# Patient Record
Sex: FEMALE | Race: BLACK OR AFRICAN AMERICAN | Employment: FULL TIME | URBAN - METROPOLITAN AREA
[De-identification: names, ages, dates, MRNs, and addresses within clinical notes are randomized per-mention and may not be internally consistent; named-entity substitution may affect disease eponyms.]

---

## 2017-01-05 ENCOUNTER — HOSPITAL ENCOUNTER (OUTPATIENT)
Dept: NON INVASIVE DIAGNOSTICS | Age: 42
Discharge: HOME | End: 2017-01-05

## 2017-01-25 DIAGNOSIS — R06.09 DOE (DYSPNEA ON EXERTION): Primary | ICD-10-CM

## 2017-01-27 ENCOUNTER — HOSPITAL ENCOUNTER (OUTPATIENT)
Dept: NON INVASIVE DIAGNOSTICS | Age: 42
Discharge: HOME | End: 2017-01-27
Payer: MEDICAID

## 2017-01-27 DIAGNOSIS — R06.09 DOE (DYSPNEA ON EXERTION): ICD-10-CM

## 2017-01-27 LAB
ATTENDING PHYSICIAN, CST07: NORMAL
DIAGNOSIS, 93000: NORMAL
DUKE TM SCORE RESULT, CST14: NORMAL
DUKE TREADMILL SCORE, CST13: NORMAL
ECG INTERP BEFORE EX, CST11: NORMAL
ECG INTERP DURING EX, CST12: NORMAL
FUNCTIONAL CAPACITY, CST17: NORMAL
KNOWN CARDIAC CONDITION, CST08: NORMAL
MAX. DIASTOLIC BP, CST04: 100 MMHG
MAX. HEART RATE, CST05: 109 BPM
MAX. SYSTOLIC BP, CST03: 176 MMHG
OVERALL BP RESPONSE TO EXERCISE, CST16: NORMAL
OVERALL HR RESPONSE TO EXERCISE, CST15: NORMAL
PEAK EX METS, CST10: 1.6 METS
PROTOCOL NAME, CST01: NORMAL
TEST INDICATION, CST09: NORMAL

## 2017-01-27 PROCEDURE — 78452 HT MUSCLE IMAGE SPECT MULT: CPT

## 2017-01-27 PROCEDURE — 93306 TTE W/DOPPLER COMPLETE: CPT

## 2017-01-27 PROCEDURE — A9500 TC99M SESTAMIBI: HCPCS

## 2017-01-27 PROCEDURE — 74011250636 HC RX REV CODE- 250/636

## 2017-01-27 PROCEDURE — 93017 CV STRESS TEST TRACING ONLY: CPT

## 2017-01-27 RX ORDER — SODIUM CHLORIDE 9 MG/ML
250 INJECTION, SOLUTION INTRAVENOUS ONCE
Status: COMPLETED | OUTPATIENT
Start: 2017-01-27 | End: 2017-01-27

## 2017-01-27 RX ADMIN — SODIUM CHLORIDE 250 ML/HR: 900 INJECTION, SOLUTION INTRAVENOUS at 10:00

## 2017-01-27 RX ADMIN — REGADENOSON 0.4 MG: 0.08 INJECTION, SOLUTION INTRAVENOUS at 10:00

## 2017-01-27 NOTE — PROGRESS NOTES
Patient was given 11 mCi of Sestamibi for the Resting pictures. Patient received 0.4 mg of Lexiscan for the exercise portion of the Stress test. Patient was then given 33 mCi of Sestamibi for the Stress pictures. Armband was removed and disposed of before the patient left.

## 2017-01-27 NOTE — PROCEDURES
[address] STRESS    Name:  Jennifer May  MR#:  [id number]  :  1975  Account #:  [de-identified]  Date of Adm:  2017  Date of Service:      PERFORMING PHYSICIAN: Lacie Deleon MD.    REQUESTING PHYSICIAN: Dr. Bernardo Morales. CLINICAL HISTORY: A 39-year-old woman with no known coronary  artery disease. CARDIAC RISK FACTORS: Include: Hypertension, hyperlipidemia,  family history of premature coronary artery disease, and tobacco  abuse. CURRENT SYMPTOMS: Include: Chest pain. PROCEDURE: After I obtained appropriate informed consent,  pharmacologic stress testing was performed using Lexiscan 0.4 mg  intravenously. The patient walked in a limited fashion for 4 minutes  following Lexiscan infusion. The patient developed no unusual  symptoms. REASON FOR TERMINATION: Protocol reached. RESTING ELECTROCARDIOGRAM: Sinus rhythm. Left axis  deviation. The stress electrocardiogram; sinus tachycardia,  nondiagnostic ST and T-wave variations/equivocal findings. MYOCARDIAL IMAGING: Performed at rest 30 minutes following the  injection of 11 mCi of sestamibi. At peak pharmacologic effect, the  patient was injected with 33 mCi of sestamibi. Gated post stress  tomographic imaging was performed 45 minutes after stress. FINDINGS: The overall quality of the study was good. Splanchnic  activity was mild. Soft tissue attenuation was mild to moderate. Left  ventricular cavity was noted to be normal in size in the rest and stress  studies. The right ventricle was unremarkable. Stress SPECT images  demonstrated a medium size area of decreased cardiac uptake of  moderate intensity in the anterior apical wall of the left ventricle. The  rest images were similar. Gated SPECT imaging showed mild anterior  apical hypokinesis. The left ventricular ejection fraction was 48%. IMPRESSION:  1.  Myocardial perfusion imaging is abnormal. There is a medium size  area of reduced cardiac tracer uptake in both the rest and stress  studies. This may represent an area of prior scarring in the anterior  apex. 2. Overall left ventricular systolic function was mildly decreased with  regional wall motion abnormalities as noted above. 3. Moderate risk for ischemia. FOLLOWUP PLAN/RECOMMENDATION: Per Dr. Heaven Salinas.         Elizabeth Wilson MD    [facility] / [facility]  D:  01/27/2017   15:41  T:  01/27/2017   18:55  Job #:  [id number]

## 2017-01-31 NOTE — PROGRESS NOTES
Per last office visit:    The patient will not be able to ambulate on a treadmill. She is complaining of bilateral knee discomfort.  Pharmacologic stress testing and a Cardiolite

## 2017-02-07 ENCOUNTER — OFFICE VISIT (OUTPATIENT)
Dept: FAMILY MEDICINE CLINIC | Age: 42
End: 2017-02-07

## 2017-02-07 VITALS
TEMPERATURE: 98.9 F | HEART RATE: 80 BPM | OXYGEN SATURATION: 98 % | DIASTOLIC BLOOD PRESSURE: 74 MMHG | BODY MASS INDEX: 42.17 KG/M2 | SYSTOLIC BLOOD PRESSURE: 136 MMHG | WEIGHT: 238 LBS | HEIGHT: 63 IN | RESPIRATION RATE: 16 BRPM

## 2017-02-07 DIAGNOSIS — I10 ESSENTIAL HYPERTENSION: Primary | ICD-10-CM

## 2017-02-07 DIAGNOSIS — E55.9 VITAMIN D DEFICIENCY: ICD-10-CM

## 2017-02-07 DIAGNOSIS — G56.03 BILATERAL CARPAL TUNNEL SYNDROME: ICD-10-CM

## 2017-02-07 DIAGNOSIS — D50.0 IRON DEFICIENCY ANEMIA DUE TO CHRONIC BLOOD LOSS: ICD-10-CM

## 2017-02-07 DIAGNOSIS — E78.00 PURE HYPERCHOLESTEROLEMIA: ICD-10-CM

## 2017-02-07 DIAGNOSIS — Z72.0 TOBACCO USE: ICD-10-CM

## 2017-02-07 NOTE — PATIENT INSTRUCTIONS
Stopping Smoking: Care Instructions  Your Care Instructions  Cigarette smokers crave the nicotine in cigarettes. Giving it up is much harder than simply changing a habit. Your body has to stop craving the nicotine. It is hard to quit, but you can do it. There are many tools that people use to quit smoking. You may find that combining tools works best for you. There are several steps to quitting. First you get ready to quit. Then you get support to help you. After that, you learn new skills and behaviors to become a nonsmoker. For many people, a necessary step is getting and using medicine. Your doctor will help you set up the plan that best meets your needs. You may want to attend a smoking cessation program to help you quit smoking. When you choose a program, look for one that has proven success. Ask your doctor for ideas. You will greatly increase your chances of success if you take medicine as well as get counseling or join a cessation program.  Some of the changes you feel when you first quit tobacco are uncomfortable. Your body will miss the nicotine at first, and you may feel short-tempered and grumpy. You may have trouble sleeping or concentrating. Medicine can help you deal with these symptoms. You may struggle with changing your smoking habits and rituals. The last step is the tricky one: Be prepared for the smoking urge to continue for a time. This is a lot to deal with, but keep at it. You will feel better. Follow-up care is a key part of your treatment and safety. Be sure to make and go to all appointments, and call your doctor if you are having problems. Its also a good idea to know your test results and keep a list of the medicines you take. How can you care for yourself at home? · Ask your family, friends, and coworkers for support. You have a better chance of quitting if you have help and support.   · Join a support group, such as Nicotine Anonymous, for people who are trying to quit smoking. · Consider signing up for a smoking cessation program, such as the American [employer] Association's Freedom from Smoking program.  · Set a quit date. Pick your date carefully so that it is not right in the middle of a big deadline or stressful time. Once you quit, do not even take a puff. Get rid of all ashtrays and lighters after your last cigarette. Clean your house and your clothes so that they do not smell of smoke. · Learn how to be a nonsmoker. Think about ways you can avoid those things that make you reach for a cigarette. ¨ Avoid situations that put you at greatest risk for smoking. For some people, it is hard to have a drink with friends without smoking. For others, they might skip a coffee break with coworkers who smoke. ¨ Change your daily routine. Take a different route to work or eat a meal in a different place. · Cut down on stress. Calm yourself or release tension by doing an activity you enjoy, such as reading a book, taking a hot bath, or gardening. · Talk to your doctor or pharmacist about nicotine replacement therapy, which replaces the nicotine in your body. You still get nicotine but you do not use tobacco. Nicotine replacement products help you slowly reduce the amount of nicotine you need. These products come in several forms, many of them available over-the-counter:  ¨ Nicotine patches  ¨ Nicotine gum and lozenges  ¨ Nicotine inhaler  · Ask your doctor about bupropion (Wellbutrin) or varenicline (Chantix), which are prescription medicines. They do not contain nicotine. They help you by reducing withdrawal symptoms, such as stress and anxiety. · Some people find hypnosis, acupuncture, and massage helpful for ending the smoking habit. · Eat a healthy diet and get regular exercise. Having healthy habits will help your body move past its craving for nicotine. · Be prepared to keep trying. Most people are not successful the first few times they try to quit.  Do not get mad at yourself if you smoke again. Make a list of things you learned and think about when you want to try again, such as next week, next month, or next year. Where can you learn more? Go to http://juan ramon-chuy.info/. Enter Y249 in the search box to learn more about \"Stopping Smoking: Care Instructions. \"  Current as of: May 26, 2016  Content Version: 11.1  © 4259-7587 [employer]. Care instructions adapted under license by [employer] (which disclaims liability or warranty for this information). If you have questions about a medical condition or this instruction, always ask your healthcare professional. [place] [address] any warranty or liability for your use of this information. DASH Diet: Care Instructions  Your Care Instructions  The DASH diet is an eating plan that can help lower your blood pressure. DASH stands for Dietary Approaches to Stop Hypertension. Hypertension is high blood pressure. The DASH diet focuses on eating foods that are high in calcium, potassium, and magnesium. These nutrients can lower blood pressure. The foods that are highest in these nutrients are fruits, vegetables, low-fat dairy products, nuts, seeds, and legumes. But taking calcium, potassium, and magnesium supplements instead of eating foods that are high in those nutrients does not have the same effect. The DASH diet also includes whole grains, fish, and poultry. The DASH diet is one of several lifestyle changes your doctor may recommend to lower your high blood pressure. Your doctor may also want you to decrease the amount of sodium in your diet. Lowering sodium while following the DASH diet can lower blood pressure even further than just the DASH diet alone. Follow-up care is a key part of your treatment and safety. Be sure to make and go to all appointments, and call your doctor if you are having problems.  It's also a good idea to know your test results and keep a list of the medicines you take. How can you care for yourself at home? Following the DASH diet  · Eat 4 to 5 servings of fruit each day. A serving is 1 medium-sized piece of fruit, ½ cup chopped or canned fruit, 1/4 cup dried fruit, or 4 ounces (½ cup) of fruit juice. Choose fruit more often than fruit juice. · Eat 4 to 5 servings of vegetables each day. A serving is 1 cup of lettuce or raw leafy vegetables, ½ cup of chopped or cooked vegetables, or 4 ounces (½ cup) of vegetable juice. Choose vegetables more often than vegetable juice. · Get 2 to 3 servings of low-fat and fat-free dairy each day. A serving is 8 ounces of milk, 1 cup of yogurt, or 1 ½ ounces of cheese. · Eat 6 to 8 servings of grains each day. A serving is 1 slice of bread, 1 ounce of dry cereal, or ½ cup of cooked rice, pasta, or cooked cereal. Try to choose whole-grain products as much as possible. · Limit lean meat, poultry, and fish to 2 servings each day. A serving is 3 ounces, about the size of a deck of cards. · Eat 4 to 5 servings of nuts, seeds, and legumes (cooked dried beans, lentils, and split peas) each week. A serving is 1/3 cup of nuts, 2 tablespoons of seeds, or ½ cup of cooked beans or peas. · Limit fats and oils to 2 to 3 servings each day. A serving is 1 teaspoon of vegetable oil or 2 tablespoons of salad dressing. · Limit sweets and added sugars to 5 servings or less a week. A serving is 1 tablespoon jelly or jam, ½ cup sorbet, or 1 cup of lemonade. · Eat less than 2,300 milligrams (mg) of sodium a day. If you limit your sodium to 1,500 mg a day, you can lower your blood pressure even more. Tips for success  · Start small. Do not try to make dramatic changes to your diet all at once. You might feel that you are missing out on your favorite foods and then be more likely to not follow the plan. Make small changes, and stick with them. Once those changes become habit, add a few more changes.   · Try some of the following:  ¨ Make it a goal to eat a fruit or vegetable at every meal and at snacks. This will make it easy to get the recommended amount of fruits and vegetables each day. ¨ Try yogurt topped with fruit and nuts for a snack or healthy dessert. ¨ Add lettuce, tomato, cucumber, and onion to sandwiches. ¨ Combine a ready-made pizza crust with low-fat mozzarella cheese and lots of vegetable toppings. Try using tomatoes, squash, spinach, broccoli, carrots, cauliflower, and onions. ¨ Have a variety of cut-up vegetables with a low-fat dip as an appetizer instead of chips and dip. ¨ Sprinkle sunflower seeds or chopped almonds over salads. Or try adding chopped walnuts or almonds to cooked vegetables. ¨ Try some vegetarian meals using beans and peas. Add garbanzo or kidney beans to salads. Make burritos and tacos with mashed dahl beans or black beans. Where can you learn more? Go to http://juan ramon-chuy.info/. Enter T915 in the search box to learn more about \"DASH Diet: Care Instructions. \"  Current as of: March 23, 2016  Content Version: 11.1  © 0802-4643 [employer]. Care instructions adapted under license by [employer] (which disclaims liability or warranty for this information). If you have questions about a medical condition or this instruction, always ask your healthcare professional. [place] [address] any warranty or liability for your use of this information.

## 2017-02-07 NOTE — MR AVS SNAPSHOT
Visit Information Date & Time Provider Department Dept. Phone Encounter #  
 2/7/2017  4:15 PM Mariely Cano, [address] [id number] Follow-up Instructions Return in about 5 months (around 7/7/2017), or if symptoms worsen or fail to improve. Your Appointments 2/21/2017  2:00 PM  
New Patient with Sandra Putnam MD  
[address] [facility] and [facility]) Appt Note: new patient; PT R/S FROM 01/31/17 RUNNING LATE  
 [address] [facility] [id number] [phone]  
  
   
 [address] [facility],[address] [facility]  
  
    
 2/21/2017  4:00 PM  
Follow Up with Mychal Aguilar MD  
[address]) Appt Note: 3mon f/u  
 [address] [facility] [address] [facility] [address] [phone]  
  
   
 [facility] [address] Upcoming Health Maintenance Date Due  
 PAP AKA CERVICAL CYTOLOGY 8/21/2018 DTaP/Tdap/Td series (2 - Td) 10/25/2026 Allergies as of 2/7/2017  Review Complete On: 2/7/2017 By: Mariely Cano MD  
  
 Severity Noted Reaction Type Reactions Ace Inhibitors  09/26/2012    Swelling Current Immunizations  Reviewed on 10/25/2016 Name Date Influenza Vaccine (Quad) PF 10/25/2016 Influenza Vaccine PF 12/11/2013 Pneumococcal Polysaccharide (PPSV-23) 10/25/2016 Tdap 10/25/2016 Not reviewed this visit You Were Diagnosed With   
  
 Codes Comments Essential hypertension    -  Primary ICD-10-CM: I10 
ICD-9-CM: 401.9 Tobacco use     ICD-10-CM: Z72.0 ICD-9-CM: 305.1 Pure hypercholesterolemia     ICD-10-CM: E78.00 ICD-9-CM: 272.0 Bilateral carpal tunnel syndrome     ICD-10-CM: G56.03 
ICD-9-CM: 354.0 Iron deficiency anemia due to chronic blood loss     ICD-10-CM: D50.0 ICD-9-CM: 280.0 Vitamin D deficiency     ICD-10-CM: E55.9 ICD-9-CM: 268.9 Vitals BP Pulse Temp Resp Height(growth percentile) Weight(growth percentile) 136/74 (BP 1 Location: Left arm, BP Patient Position: Sitting) 80 98.9 °F (37.2 °C) (Oral) 16 5' 3\" (1.6 m) 238 lb (108 kg) SpO2 BMI OB Status Smoking Status 98% 42.16 kg/m2 Having regular periods Current Every Day Smoker Vitals History BMI and BSA Data Body Mass Index Body Surface Area  
 42.16 kg/m 2 2.19 m 2 Preferred Pharmacy Pharmacy Name Phone [facility] [address] [phone] Your Updated Medication List  
  
   
This list is accurate as of: 2/7/17  5:02 PM.  Always use your most recent med list.  
  
  
  
  
 albuterol 90 mcg/actuation inhaler Commonly known as:  Chelly Melendez Take 2 Puffs by inhalation every four (4) hours as needed for Wheezing. amLODIPine 10 mg tablet Commonly known as:  Humphreys Reyes Take 1 Tab by mouth daily. atorvastatin 20 mg tablet Commonly known as:  LIPITOR Take 1 Tab by mouth nightly. buPROPion  mg tablet Commonly known as:  Barron Crew Take 150 mg by mouth every morning. butalbital-acetaminophen-caff -40 mg per capsule Commonly known as:  [employer] Take 1 tablet every 8 hours as needed for headache  
  
 carvedilol 25 mg tablet Commonly known as:  Haskel Scarce Take 1 Tab by mouth two (2) times daily (with meals). cloNIDine HCl 0.1 mg tablet Commonly known as:  CATAPRES Take 1 Tab by mouth two (2) times a day. desloratadine 5 mg tablet Commonly known as:  CLARINEX Take 1 Tab by mouth daily as needed for Allergies. ergocalciferol 50,000 unit capsule Commonly known as:  ERGOCALCIFEROL Take 1 Cap by mouth every seven (7) days. ferrous sulfate 325 mg (65 mg iron) tablet Take  by mouth daily. gabapentin 100 mg capsule Commonly known as:  NEURONTIN  
 1 twice a day for 2 days then 1 three times a day for three days then 2 three times a day for 7 days then 3 three times a day GARLIC PO Take  by mouth.  
  
 polyethylene glycol 17 gram/dose powder Commonly known as:  Deliliatwin Hefty Take 17 g by mouth daily. One to two caps daily  
  
 sertraline 50 mg tablet Commonly known as:  ZOLOFT Take 75 mg by mouth nightly. Follow-up Instructions Return in about 5 months (around 7/7/2017), or if symptoms worsen or fail to improve. Patient Instructions Stopping Smoking: Care Instructions Your Care Instructions Cigarette smokers crave the nicotine in cigarettes. Giving it up is much harder than simply changing a habit. Your body has to stop craving the nicotine. It is hard to quit, but you can do it. There are many tools that people use to quit smoking. You may find that combining tools works best for you. There are several steps to quitting. First you get ready to quit. Then you get support to help you. After that, you learn new skills and behaviors to become a nonsmoker. For many people, a necessary step is getting and using medicine. Your doctor will help you set up the plan that best meets your needs. You may want to attend a smoking cessation program to help you quit smoking. When you choose a program, look for one that has proven success. Ask your doctor for ideas. You will greatly increase your chances of success if you take medicine as well as get counseling or join a cessation program. 
Some of the changes you feel when you first quit tobacco are uncomfortable. Your body will miss the nicotine at first, and you may feel short-tempered and grumpy. You may have trouble sleeping or concentrating. Medicine can help you deal with these symptoms. You may struggle with changing your smoking habits and rituals. The last step is the tricky one: Be prepared for the smoking urge to continue for a time.  This is a lot to deal with, but keep at it. You will feel better. Follow-up care is a key part of your treatment and safety. Be sure to make and go to all appointments, and call your doctor if you are having problems. Its also a good idea to know your test results and keep a list of the medicines you take. How can you care for yourself at home? · Ask your family, friends, and coworkers for support. You have a better chance of quitting if you have help and support. · Join a support group, such as Nicotine Anonymous, for people who are trying to quit smoking. · Consider signing up for a smoking cessation program, such as the American Lung Association's Freedom from Smoking program. 
· Set a quit date. Pick your date carefully so that it is not right in the middle of a big deadline or stressful time. Once you quit, do not even take a puff. Get rid of all ashtrays and lighters after your last cigarette. Clean your house and your clothes so that they do not smell of smoke. · Learn how to be a nonsmoker. Think about ways you can avoid those things that make you reach for a cigarette. ¨ Avoid situations that put you at greatest risk for smoking. For some people, it is hard to have a drink with friends without smoking. For others, they might skip a coffee break with coworkers who smoke. ¨ Change your daily routine. Take a different route to work or eat a meal in a different place. · Cut down on stress. Calm yourself or release tension by doing an activity you enjoy, such as reading a book, taking a hot bath, or gardening. · Talk to your doctor or pharmacist about nicotine replacement therapy, which replaces the nicotine in your body. You still get nicotine but you do not use tobacco. Nicotine replacement products help you slowly reduce the amount of nicotine you need. These products come in several forms, many of them available over-the-counter: ¨ Nicotine patches ¨ Nicotine gum and lozenges ¨ Nicotine inhaler · Ask your doctor about bupropion (Wellbutrin) or varenicline (Chantix), which are prescription medicines. They do not contain nicotine. They help you by reducing withdrawal symptoms, such as stress and anxiety. · Some people find hypnosis, acupuncture, and massage helpful for ending the smoking habit. · Eat a healthy diet and get regular exercise. Having healthy habits will help your body move past its craving for nicotine. · Be prepared to keep trying. Most people are not successful the first few times they try to quit. Do not get mad at yourself if you smoke again. Make a list of things you learned and think about when you want to try again, such as next week, next month, or next year. Where can you learn more? Go to http://juan ramonAmitivechuy.info/. Enter D540 in the search box to learn more about \"Stopping Smoking: Care Instructions. \" Current as of: May 26, 2016 Content Version: 11.1 © 9276-3413 i4.ms. Care instructions adapted under license by [employer] (which disclaims liability or warranty for this information). If you have questions about a medical condition or this instruction, always ask your healthcare professional. [address] any warranty or liability for your use of this information. DASH Diet: Care Instructions Your Care Instructions The DASH diet is an eating plan that can help lower your blood pressure. DASH stands for Dietary Approaches to Stop Hypertension. Hypertension is high blood pressure. The DASH diet focuses on eating foods that are high in calcium, potassium, and magnesium. These nutrients can lower blood pressure. The foods that are highest in these nutrients are fruits, vegetables, low-fat dairy products, nuts, seeds, and legumes.  But taking calcium, potassium, and magnesium supplements instead of eating foods that are high in those nutrients does not have the same effect. The DASH diet also includes whole grains, fish, and poultry. The DASH diet is one of several lifestyle changes your doctor may recommend to lower your high blood pressure. Your doctor may also want you to decrease the amount of sodium in your diet. Lowering sodium while following the DASH diet can lower blood pressure even further than just the DASH diet alone. Follow-up care is a key part of your treatment and safety. Be sure to make and go to all appointments, and call your doctor if you are having problems. It's also a good idea to know your test results and keep a list of the medicines you take. How can you care for yourself at home? Following the DASH diet · Eat 4 to 5 servings of fruit each day. A serving is 1 medium-sized piece of fruit, ½ cup chopped or canned fruit, 1/4 cup dried fruit, or 4 ounces (½ cup) of fruit juice. Choose fruit more often than fruit juice. · Eat 4 to 5 servings of vegetables each day. A serving is 1 cup of lettuce or raw leafy vegetables, ½ cup of chopped or cooked vegetables, or 4 ounces (½ cup) of vegetable juice. Choose vegetables more often than vegetable juice. · Get 2 to 3 servings of low-fat and fat-free dairy each day. A serving is 8 ounces of milk, 1 cup of yogurt, or 1 ½ ounces of cheese. · Eat 6 to 8 servings of grains each day. A serving is 1 slice of bread, 1 ounce of dry cereal, or ½ cup of cooked rice, pasta, or cooked cereal. Try to choose whole-grain products as much as possible. · Limit lean meat, poultry, and fish to 2 servings each day. A serving is 3 ounces, about the size of a deck of cards. · Eat 4 to 5 servings of nuts, seeds, and legumes (cooked dried beans, lentils, and split peas) each week. A serving is 1/3 cup of nuts, 2 tablespoons of seeds, or ½ cup of cooked beans or peas. · Limit fats and oils to 2 to 3 servings each day. A serving is 1 teaspoon of vegetable oil or 2 tablespoons of salad dressing. · Limit sweets and added sugars to 5 servings or less a week. A serving is 1 tablespoon jelly or jam, ½ cup sorbet, or 1 cup of lemonade. · Eat less than 2,300 milligrams (mg) of sodium a day. If you limit your sodium to 1,500 mg a day, you can lower your blood pressure even more. Tips for success · Start small. Do not try to make dramatic changes to your diet all at once. You might feel that you are missing out on your favorite foods and then be more likely to not follow the plan. Make small changes, and stick with them. Once those changes become habit, add a few more changes. · Try some of the following: ¨ Make it a goal to eat a fruit or vegetable at every meal and at snacks. This will make it easy to get the recommended amount of fruits and vegetables each day. ¨ Try yogurt topped with fruit and nuts for a snack or healthy dessert. ¨ Add lettuce, tomato, cucumber, and onion to sandwiches. ¨ Combine a ready-made pizza crust with low-fat mozzarella cheese and lots of vegetable toppings. Try using tomatoes, squash, spinach, broccoli, carrots, cauliflower, and onions. ¨ Have a variety of cut-up vegetables with a low-fat dip as an appetizer instead of chips and dip. ¨ Sprinkle sunflower seeds or chopped almonds over salads. Or try adding chopped walnuts or almonds to cooked vegetables. ¨ Try some vegetarian meals using beans and peas. Add garbanzo or kidney beans to salads. Make burritos and tacos with mashed dahl beans or black beans. Where can you learn more? Go to http://juan ramon-chuy.info/. Enter Q638 in the search box to learn more about \"DASH Diet: Care Instructions. \" Current as of: March 23, 2016 Content Version: 11.1 © 0962-1665 [employer]. Care instructions adapted under license by [employer] (which disclaims liability or warranty for this information).  If you have questions about a medical condition or this instruction, always ask your healthcare professional. [address] any warranty or liability for your use of this information. Introducing hospitals & HEALTH SERVICES! Dear Barrett Bound: Thank you for requesting a [employer] account. Our records indicate that you already have an active [employer] account. You can access your account anytime at [email or web address] Did you know that you can access your hospital and ER discharge instructions at any time in [employer]? You can also review all of your test results from your hospital stay or ER visit. Additional Information If you have questions, please visit the Frequently Asked Questions section of the [employer] website at [email or web address] Remember, [employer] is NOT to be used for urgent needs. For medical emergencies, dial 911. Now available from your iPhone and Android! Please provide this summary of care documentation to your next provider. Your primary care clinician is listed as Vikki Lenz. If you have any questions after today's visit, please call [phone].

## 2017-02-07 NOTE — PROGRESS NOTES
Alea HayesGradyElana, 35 y.o.,  female    SUBJECTIVE   ff-up    HTN-saw cardiologist and modified meds, BP is better. Stress test result pending. HL-   Says taking all medications, in the past had some inconsistencies with this. Denies cp, sob, leg edema. She continues to smoke 1/2ppd. anemia-  has had chronic menorrhagia has yet to see dr. Nabil Yepez, there was some discussion on endometrial ablation after D and C previously. she does feel tired, and on oral iron replacement for several years. Denies melena, constipation. Reviewed colonoscopy 4/2016 showing internal hemorrhoids, normal EGD/no polyp (Dr. Diana Clark). She reports unable to ff-up with hematologist dr. Jennifer Ruiz due to insurance coverage. She saw new gynecologist, unrecalled name of 'free clinic' and says being evaluated. Currently seeing dr. Daya Marquez for bilateral hand pain, EMG reviewed c/w carpal tunnel. Reviewed labs a1c noted to be 7. She has been prediabetic previous check. Her diet is poor. Says saw psychologist in [facility], and was dx with bipolar disorder, previously was anxiety/depression. She does not agree with diagnosis and hence did not ff-up. ROS:  See HPI, all others negative        Patient Active Problem List   Diagnosis Code    HTN (hypertension) I10    Gastroesophageal reflux disease K21.9    Anxiety and depression F41.9, F32.9    Positive H. pylori test A04.8    Iron deficiency anemia D50.9    Vitamin D deficiency E55.9    Tobacco use Z72.0    Bilateral carpal tunnel syndrome G56.03    Pure hypercholesterolemia E78.00       Current Outpatient Prescriptions   Medication Sig Dispense Refill    GARLIC PO Take  by mouth.  cloNIDine HCl (CATAPRES) 0.1 mg tablet Take 1 Tab by mouth two (2) times a day. 60 Tab 11    carvedilol (COREG) 25 mg tablet Take 1 Tab by mouth two (2) times daily (with meals).  60 Tab 11    gabapentin (NEURONTIN) 100 mg capsule 1 twice a day for 2 days then 1 three times a day for three days then 2 three times a day for 7 days then 3 three times a day 210 Cap 2    atorvastatin (LIPITOR) 20 mg tablet Take 1 Tab by mouth nightly. 30 Tab 0    desloratadine (CLARINEX) 5 mg tablet Take 1 Tab by mouth daily as needed for Allergies. 90 Tab 3    ergocalciferol (ERGOCALCIFEROL) 50,000 unit capsule Take 1 Cap by mouth every seven (7) days. 12 Cap 0    amLODIPine (NORVASC) 10 mg tablet Take 1 Tab by mouth daily. 30 Tab 0    ferrous sulfate 325 mg (65 mg iron) tablet Take  by mouth daily.  polyethylene glycol (MIRALAX) 17 gram/dose powder Take 17 g by mouth daily. One to two caps daily      Butalbital-Acetaminophen-Caff (FIORICET) -40 mg cap Take 1 tablet every 8 hours as needed for headache 12 Cap 0    albuterol (PROVENTIL, VENTOLIN) 90 mcg/actuation inhaler Take 2 Puffs by inhalation every four (4) hours as needed for Wheezing. 17 g 0    sertraline (ZOLOFT) 50 mg tablet Take 75 mg by mouth nightly.  buPROPion XL (WELLBUTRIN XL) 150 mg tablet Take 150 mg by mouth every morning. 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 repeat in 10 years.    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 pylori test 6/9/14     placed on antibiotics and H2 blocker 6/11/14    PPD positive, treated     Tuberculosis ronn, 4/14/16    Vitamin D deficiency 6/9/14       Social History     Social History    Marital status:      Spouse name: N/A    Number of children: N/A    Years of education: N/A     Occupational History    Not on file.      Social History Main Topics    Smoking status: Current Every Day Smoker     Packs/day: 0.50     Years: 0.50    Smokeless tobacco: Never Used    Alcohol use 1.2 oz/week     2 Cans of beer per week      Comment: monthly 1 or 2    Drug use: Yes     Special: Marijuana, Cocaine      Comment: Cocaine stopped 2008, Marijuana 4/2/16 usually once a month or two    Sexual activity: Not Currently     Partners: Female, Male     Birth control/ protection: None     Other Topics Concern     Service Yes     [id number]    Blood Transfusions Yes     2012    Caffeine Concern No    Occupational Exposure No    Hobby Hazards No    Sleep Concern No    Stress Concern Yes    Weight Concern No    Special Diet No    Back Care No    Exercise No    Bike Helmet No    Seat Belt No    Self-Exams No     Social History Narrative       Family History   Problem Relation Age of Onset    Hypertension Mother     Diabetes Mother     Depression Mother     Substance Abuse Mother      Tobacco use    Migraines Mother     High Cholesterol Mother     Heart Attack Mother     Heart Failure Mother     Eczema Mother     Arthritis-osteo Mother     Hypertension Father     Diabetes Father     Substance Abuse Father      Tobacco use and drug abuse    Stroke Paternal Aunt     Heart Disease Maternal Grandfather     Hypertension Maternal Grandfather     High Cholesterol Maternal Grandfather     Diabetes Maternal Grandfather     Stroke Maternal Grandfather     Heart Attack Maternal Grandfather     Heart Failure Maternal Grandfather     Alcohol abuse Brother      Drug/Alcohol abuse    Substance Abuse Maternal Grandmother      Tobacco use - MGM, MGF, and brother,    Diabetes Maternal Grandmother     Hypertension Brother     High Cholesterol Brother     Diabetes Sister     Diabetes Paternal Grandmother     Heart Attack Brother     Heart Failure Brother          OBJECTIVE    Physical Exam:     Visit Vitals    /74 (BP 1 Location: Left arm, BP Patient Position: Sitting)    Pulse 80    Temp 98.9 °F (37.2 °C) (Oral)    Resp 16    Ht 5' 3\" (1.6 m)    Wt 238 lb (108 kg)    SpO2 98%    BMI 42.16 kg/m2       General: alert, obese, AA, in no apparent distress or pain  Neck: supple, no adenopathy palpated  CVS: normal rate, regular rhythm, distinct S1 and S2  Lungs:clear to ausculation bilaterally, no crackles, wheezing or rhonchi noted  Abdomen: normoactive bowel sounds, soft, non-tender  Extremities: no edema, no cyanosis,  Skin: warm, no lesions, rashes noted  Psych:  mood and affect normal    Results for orders placed or performed during the hospital encounter of 01/27/17   NUCLEAR STRESS TEST   Result Value Ref Range    Diagnosis      Test indication Chest Pain     Functional capacity      ECG Interp. Before Exercise      ECG Interp. During Exercise      Overall HR response to exercise appropriate     Overall BP response to exercise normal resting BP - appropriate response     Max. Systolic  mmHg    Max. Diastolic  mmHg    Max. Heart rate 109 BPM    [facility] treadmill score      Lam TM score result      Peak Ex METs 1.6 METS    Protocol name Ana Bailey cardiac condition      Attending physician           ASSESSMENT/PLAN  Alea was seen today for hand pain. Diagnoses and all orders for this visit:    Essential hypertension  Improved, thank you dr. Loretta Styles for recs. Modified meds. Placed on clonidine/coreg now.  Stopped losartan and metoprolol  Cont norvasc, chlorthalidone  Counseled on smoking cessation    Iron deficiency anemia due to chronic blood loss   likely due to menorrhagia, she is mildly symptomatic  Pt says cannot see dr. Brielle Monae again due to insurance issues. Will refer to gerson  there was previous discussion on possible ablation. She has had D and Cs. Cont po fe at this time, take with vit c.   Colonoscopy/egd 4/2016 normal, internal hemorrhoids noted. .   Cont po fe supplementation  Recheck in 5 months:   -     CBC WITH AUTOMATED DIFF; Future    Vitamin D deficiency  Currently on  vit d 50 k q weekly  Recheck vit d in 5 months. Prediabetes  a1c 6.2  Reiterated wt loss/diet    BMI 40.0-44.9, adult (HCC)    Anxiety and depression  Encouraged ff-up appt with CPA for further evaluation, pt is agreeable, and would schedule herself    Pure hypercholesterolemia-at goal LDL <130, cont statin  At goal cont lipitor    Tobacco use  Counseled on cessation    Allergic rhinitis  Cont  clarinex    Follow-up Disposition:  Return in about 5 months (around 7/7/2017), or if symptoms worsen or fail to improve. Patient understands plan of care. Patient has provided input and agrees with goals.

## 2017-02-07 NOTE — PROGRESS NOTES
Chief Complaint   Patient presents with    Hypertension    Cholesterol Problem    Anxiety     1. Have you been to the ER, urgent care clinic since your last visit? Hospitalized since your last visit? No    2. Have you seen or consulted any other health care providers outside of the [facility] since your last visit? Include any pap smears or colon screening.  Yes Dr. Epimenio Libman cards and Dr. Govind Colbert, psych at [facility],   [facility] for gyn

## 2017-02-17 ENCOUNTER — TELEPHONE (OUTPATIENT)
Dept: CARDIOLOGY CLINIC | Age: 42
End: 2017-02-17

## 2017-03-15 ENCOUNTER — OFFICE VISIT (OUTPATIENT)
Dept: ORTHOPEDIC SURGERY | Age: 42
End: 2017-03-15

## 2017-03-15 VITALS
TEMPERATURE: 98.4 F | WEIGHT: 244.8 LBS | DIASTOLIC BLOOD PRESSURE: 97 MMHG | RESPIRATION RATE: 18 BRPM | SYSTOLIC BLOOD PRESSURE: 167 MMHG | HEIGHT: 63 IN | BODY MASS INDEX: 43.38 KG/M2 | HEART RATE: 91 BPM

## 2017-03-15 DIAGNOSIS — M47.899 FACET SYNDROME: ICD-10-CM

## 2017-03-15 DIAGNOSIS — M54.50 PAIN OF LUMBAR SPINE: ICD-10-CM

## 2017-03-15 DIAGNOSIS — M79.18 MYOFASCIAL PAIN: Primary | ICD-10-CM

## 2017-03-15 DIAGNOSIS — M54.2 NECK PAIN: ICD-10-CM

## 2017-03-15 DIAGNOSIS — M54.81 OCCIPITAL NEURALGIA, UNSPECIFIED LATERALITY: ICD-10-CM

## 2017-03-15 DIAGNOSIS — M54.6 THORACIC SPINE PAIN: ICD-10-CM

## 2017-03-15 RX ORDER — DICLOFENAC SODIUM 75 MG/1
75 TABLET, DELAYED RELEASE ORAL
Qty: 60 TAB | Refills: 5 | Status: SHIPPED | OUTPATIENT
Start: 2017-03-15 | End: 2017-12-23 | Stop reason: SDUPTHER

## 2017-03-15 RX ORDER — LIDOCAINE HYDROCHLORIDE 20 MG/ML
4 INJECTION, SOLUTION EPIDURAL; INFILTRATION; INTRACAUDAL; PERINEURAL ONCE
Qty: 4 ML | Refills: 0 | Status: CANCELLED
Start: 2017-03-15 | End: 2017-03-15

## 2017-03-15 RX ORDER — BUPIVACAINE HYDROCHLORIDE 2.5 MG/ML
1 INJECTION, SOLUTION INFILTRATION; PERINEURAL ONCE
Qty: 1 ML | Refills: 0
Start: 2017-03-15 | End: 2017-03-15

## 2017-03-15 RX ORDER — BETAMETHASONE SODIUM PHOSPHATE AND BETAMETHASONE ACETATE 3; 3 MG/ML; MG/ML
12 INJECTION, SUSPENSION INTRA-ARTICULAR; INTRALESIONAL; INTRAMUSCULAR; SOFT TISSUE ONCE
Qty: 1 ML | Refills: 0
Start: 2017-03-15 | End: 2017-03-15

## 2017-03-15 RX ORDER — BUPIVACAINE HYDROCHLORIDE 2.5 MG/ML
4 INJECTION, SOLUTION INFILTRATION; PERINEURAL ONCE
Qty: 4 ML | Refills: 0 | Status: CANCELLED
Start: 2017-03-15 | End: 2017-03-15

## 2017-03-15 RX ORDER — LIDOCAINE HYDROCHLORIDE 20 MG/ML
1 INJECTION, SOLUTION EPIDURAL; INFILTRATION; INTRACAUDAL; PERINEURAL ONCE
Qty: 1 ML | Refills: 0
Start: 2017-03-15 | End: 2017-03-15

## 2017-03-15 RX ORDER — BETAMETHASONE SODIUM PHOSPHATE AND BETAMETHASONE ACETATE 3; 3 MG/ML; MG/ML
12 INJECTION, SUSPENSION INTRA-ARTICULAR; INTRALESIONAL; INTRAMUSCULAR; SOFT TISSUE ONCE
Qty: 2 ML | Refills: 0 | Status: CANCELLED
Start: 2017-03-15 | End: 2017-03-15

## 2017-03-15 NOTE — PROGRESS NOTES
[address]  [address]  [place], [address] [place] [address] Street  Phone: [phone]  Fax: [phone]        Alison Alamo  : 1975  PCP: Theresa Mace MD  3/15/2017    NEW PATIENT      ASSESSMENT AND PLAN     Alea Manzano comes in to the office today c/o 4-10/10 aching pain that encompasses her entire body x several years. She appears to have several potential pain generators causing her diffuse pain. I believe the pain in her cervical region is due to myofascial pain while the pain in her lumbar region is predominantly due to facet syndrome. We will seek to address the myofascial pain first with PT. Pt was given an occipital nerve block with immediate relief. We will address her facet syndrome at a later visit. Pt was prescribed diclofenac 75mg BID prn. The risks, benefits, and potential side effects of this medication were discussed. Pt was consulted on her smoking habits. We discussed the risks of smoking and she was advised to quit to improve her health. Pt will f/u in 6 weeks or prn. Alea was seen today for back pain. Diagnoses and all orders for this visit:    Myofascial pain  -     REFERRAL TO PHYSICAL THERAPY  -     diclofenac EC (VOLTAREN) 75 mg EC tablet; Take 1 Tab by mouth two (2) times daily as needed. Occipital neuralgia, unspecified laterality  -     bupivacaine (MARCAINE) 0.25 % (2.5 mg/mL) soln injection; 1 mL by IntraMUSCular route once for 1 dose. -     INJECTION, BUPIVICAINE HYDRO  -     LIDOCAINE INJECTION  -     lidocaine, PF, (XYLOCAINE) 20 mg/mL (2 %) injection; 1 mL by Other route once for 1 dose. -     betamethasone (CELESTONE SOLUSPAN) 6 mg/mL injection; 2 mL by IntraMUSCular route once for 1 dose.  -     BETAMETHASONE ACETATE & SODIUM PHOSPHATE INJECTION 6 MG  -     68591 - INJECT TRIGGER POINTS, > 3  -     REFERRAL TO PHYSICAL THERAPY  -     diclofenac EC (VOLTAREN) 75 mg EC tablet;  Take 1 Tab by mouth two (2) times daily as needed. Facet syndrome  -     diclofenac EC (VOLTAREN) 75 mg EC tablet; Take 1 Tab by mouth two (2) times daily as needed. Neck pain  -     [43206] C Spine 2-3V  -     REFERRAL TO PHYSICAL THERAPY  -     diclofenac EC (VOLTAREN) 75 mg EC tablet; Take 1 Tab by mouth two (2) times daily as needed. Pain of lumbar spine  -     [26856] L/S 2-3 views  -     REFERRAL TO PHYSICAL THERAPY  -     diclofenac EC (VOLTAREN) 75 mg EC tablet; Take 1 Tab by mouth two (2) times daily as needed. Thoracic spine pain  -     [66353] T Spine 2V  -     REFERRAL TO PHYSICAL THERAPY  -     diclofenac EC (VOLTAREN) 75 mg EC tablet; Take 1 Tab by mouth two (2) times daily as needed. Other orders  -     Cancel: bupivacaine (MARCAINE) 0.25 % (2.5 mg/mL) soln injection; 4 mL by IntraMUSCular route once for 1 dose. -     Cancel: INJECTION, BUPIVICAINE HYDRO  -     Cancel: LIDOCAINE INJECTION  -     Cancel: lidocaine, PF, (XYLOCAINE) 20 mg/mL (2 %) injection; 4 mL by Other route once for 1 dose. -     Cancel: betamethasone (CELESTONE SOLUSPAN) 6 mg/mL injection; 2 mL by IntraMUSCular route once for 1 dose. -     Cancel: BETAMETHASONE ACETATE & SODIUM PHOSPHATE INJECTION 6 MG  -     Cancel: 79466 - INJECT TRIGGER POINTS, > 3         Follow-up Disposition:  Return in about 6 weeks (around 4/26/2017), or if symptoms worsen or fail to improve. CHIEF COMPLAINT  Alea Manzano is seen today in consultation at the request of Tessa Bermudez MD for complaints of pain that encompasses her entire body. HISTORY OF PRESENT ILLNESS  Alea Fajardo is a 35 y.o. female c/o 4-10/10 aching pain that encompasses her entire body x several years. All forms of movement exacerbate her pain. She has taken Prednisone with great relief in the past.    Pt states she has been diagnosed with bilateral carpal tunnel but has not proceeded with surgery nor injections.  Her symptoms appear subclinical.    Pt denies any fevers, chills, nausea, vomiting. Pt denies any chest pain and shortness of breath. Pt denies any ear, nose, and throat problems. Pt denies any fecal or urinary incontinence. She smokes half a pack per day    She works various jobs (nursing, management, clerical). 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 repeat in 10 years. 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 [address]     Neuropathy     Other unknown and unspecified cause of morbidity or mortality     Positive H. pylori test 6/9/14    placed on antibiotics and H2 blocker 6/11/14    PPD positive, treated     Tuberculosis     denies, 4/14/16    Vitamin D deficiency 6/9/14       Past Surgical History:   Procedure Laterality Date    ENDOMETRIAL CRYOABLATION      HX COLONOSCOPY  04/25/2016    DR. ESTRADA REPEAT IN 2026 (10 YEARS)    HX HERNIA REPAIR  8065    umbilical       MEDICATIONS      Current Outpatient Prescriptions   Medication Sig Dispense Refill    bupivacaine (MARCAINE) 0.25 % (2.5 mg/mL) soln injection 1 mL by IntraMUSCular route once for 1 dose. 1 mL 0    lidocaine, PF, (XYLOCAINE) 20 mg/mL (2 %) injection 1 mL by Other route once for 1 dose. 1 mL 0    betamethasone (CELESTONE SOLUSPAN) 6 mg/mL injection 2 mL by IntraMUSCular route once for 1 dose.  1 mL 0    diclofenac EC (VOLTAREN) 75 mg EC tablet Take 1 Tab by mouth two (2) times daily as needed. 60 Tab 5    ferrous sulfate 325 mg (65 mg iron) tablet TAKE ONE TABLET BY MOUTH ONCE DAILY BEFORE BREAKFAST 90 Tab 1    GARLIC PO Take  by mouth.  cloNIDine HCl (CATAPRES) 0.1 mg tablet Take 1 Tab by mouth two (2) times a day. 60 Tab 11    carvedilol (COREG) 25 mg tablet Take 1 Tab by mouth two (2) times daily (with meals). 60 Tab 11    gabapentin (NEURONTIN) 100 mg capsule 1 twice a day for 2 days then 1 three times a day for three days then 2 three times a day for 7 days then 3 three times a day 210 Cap 2    desloratadine (CLARINEX) 5 mg tablet Take 1 Tab by mouth daily as needed for Allergies. 90 Tab 3    ferrous sulfate 325 mg (65 mg iron) tablet Take  by mouth daily.  polyethylene glycol (MIRALAX) 17 gram/dose powder Take 17 g by mouth daily. One to two caps daily      Butalbital-Acetaminophen-Caff (FIORICET) -40 mg cap Take 1 tablet every 8 hours as needed for headache 12 Cap 0    albuterol (PROVENTIL, VENTOLIN) 90 mcg/actuation inhaler Take 2 Puffs by inhalation every four (4) hours as needed for Wheezing. 17 g 0    amLODIPine (NORVASC) 10 mg tablet TAKE ONE TABLET BY MOUTH ONCE DAILY 30 Tab 5    atorvastatin (LIPITOR) 20 mg tablet Take 1 Tab by mouth nightly. 30 Tab 0    ergocalciferol (ERGOCALCIFEROL) 50,000 unit capsule Take 1 Cap by mouth every seven (7) days. 12 Cap 0    sertraline (ZOLOFT) 50 mg tablet Take 75 mg by mouth nightly.  buPROPion XL (WELLBUTRIN XL) 150 mg tablet Take 150 mg by mouth every morning.          ALLERGIES    Allergies   Allergen Reactions    Ace Inhibitors Swelling          SOCIAL HISTORY    Social History     Social History    Marital status:      Spouse name: N/A    Number of children: N/A    Years of education: N/A     Social History Main Topics    Smoking status: Current Every Day Smoker     Packs/day: 0.50     Years: 0.50    Smokeless tobacco: Never Used    Alcohol use 1.2 oz/week     2 Cans of beer per week      Comment: monthly 1 or 2    Drug use: Yes     Special: Marijuana, Cocaine      Comment: Cocaine stopped 2008, Marijuana 4/2/16 usually once a month or two    Sexual activity: Not Currently     Partners: Female, Male     Birth control/ protection: None     Other Topics Concern   [address] Service Yes     [id number]    Blood Transfusions Yes     2012    Caffeine Concern No    Occupational Exposure No    Hobby Hazards No    Sleep Concern No    Stress Concern Yes    Weight Concern No    Special Diet No    Back Care No    Exercise No    Bike Helmet No    Seat Belt No    Self-Exams No     Social History Narrative     Social History Narrative      Problem Relation Age of Onset    Hypertension Mother     Diabetes Mother     Depression Mother     Substance Abuse Mother      Tobacco use    Migraines Mother     High Cholesterol Mother     Heart Attack Mother     Heart Failure Mother     Eczema Mother     Arthritis-osteo Mother     Hypertension Father     Diabetes Father     Substance Abuse Father      Tobacco use and drug abuse    Stroke Paternal Aunt     Heart Disease Maternal Grandfather     Hypertension Maternal Grandfather     High Cholesterol Maternal Grandfather     Diabetes Maternal Grandfather     Stroke Maternal Grandfather     Heart Attack Maternal Grandfather     Heart Failure Maternal Grandfather     Alcohol abuse Brother      Drug/Alcohol abuse    Substance Abuse Maternal Grandmother      Tobacco use - MGM, MGF, and brother,    Diabetes Maternal Grandmother     Hypertension Brother     High Cholesterol Brother     Diabetes Sister     Diabetes Paternal Grandmother     Heart Attack Brother     Heart Failure Brother          REVIEW OF SYSTEMS  Review of Systems   Constitutional: Positive for chills, diaphoresis (night sweats) and fever. Negative for malaise/fatigue and weight loss. Respiratory: Negative for shortness of breath.     Cardiovascular: Negative for chest pain and leg swelling. Gastrointestinal: Negative for constipation, nausea and vomiting. Neurological: Negative for dizziness, tingling, seizures, loss of consciousness and headaches. Psychiatric/Behavioral: The patient does not have insomnia. PHYSICAL EXAMINATION  Visit Vitals    BP (!) 167/97    Pulse 91    Temp 98.4 °F (36.9 °C) (Oral)    Resp 18    Ht 5' 3\" (1.6 m)    Wt 244 lb 12.8 oz (111 kg)    BMI 43.36 kg/m2         Pain Assessment  3/15/2017   Location of Pain Back   Location Modifiers (No Data)   Severity of Pain 4   Quality of Pain Aching;Dull; Sharp   Quality of Pain Comment stabbing   Duration of Pain A few days   Frequency of Pain Intermittent   Aggravating Factors Bending;Stretching;Exercise;Kneeling;Stairs; Walking;Standing;Squatting;Straightening; Other (Comment)   Limiting Behavior Yes   Relieving Factors Nothing         Constitutional:  Well developed, well nourished, in no acute distress. Psychiatric: Affect and mood are appropriate. HEENT: Normocephalic, atraumatic. Extraocular movements intact. Integumentary: No rashes or abrasions noted on exposed areas. Cardiovascular: Regular rate and rhythm. Pulmonary: Clear to auscultation bilaterally. SPINE/MUSCULOSKELETAL EXAM    Cervical spine:  Neck is midline. Normal muscle tone. No focal atrophy is noted. ROM pain free. Shoulder ROM intact. Tenderness to palpation particularly in the levator scapula. Negative Spurling's sign. Negative Tinel's sign. Negative Álvarez's sign. Sensation in the bilateral arms grossly intact to light touch. Lumbar spine:  No rash, ecchymosis, or gross obliquity. No fasciculations. No focal atrophy is noted. No pain with hip ROM. Full range of motion. Tenderness to palpation. No tenderness to palpation at the sciatic notch. SI joints non-tender. Trochanters non tender. Pain with back extension > flexion. Positive shopping cart sign.      Sensation in the bilateral legs grossly intact to light touch. MOTOR:      Biceps  Triceps Deltoids Wrist Ext Wrist Flex Hand Intrin   Right 5/5 5/5 5/5 5/5 5/5 5/5   Left 5/5 5/5 5/5 5/5 5/5 5/5             Hip Flex  Quads Hamstrings Ankle DF EHL Ankle PF   Right 5/5 5/5 5/5 5/5 5/5 5/5   Left 5/5 5/5 5/5 5/5 5/5 5/5     DTRs are 2+ biceps, triceps, brachioradialis, patella, and Achilles. Negative Straight Leg raise. Squat not tested. No difficulty with tandem gait. Ambulation without assistive device. FWB. RADIOGRAPHS  Cervical XR images taken on 3/15/2017 personally reviewed with patient:  1) Bridging osteophytes  2) Good overall alignment  3) Disc space narrowing at C5-6 and C6-7  4) No obvious fractures    Thoracic XR images taken on 3/15/2017 personally reviewed with patient:  1) No obvious fractures or instabilities    Lumbar XR images taken on 3/15/2017 personally reviewed with patient:  1) Slight hyperlordosis (72.4 degrees)  2) No obvious fractures or instabilities     reviewed    Ms. Manzano has a reminder for a \"due or due soon\" health maintenance. I have asked that she contact her primary care provider for follow-up on this health maintenance. This plan was reviewed with the patient and patient agrees. All questions were answered. More than half of this visit today was spent on counseling. Written by Samra Dumont, as dictated by Dr. Terrance Barger. I, Dr. Terrance Barger, confirm that all documentation is accurate.

## 2017-03-15 NOTE — PATIENT INSTRUCTIONS
What Is Myofascial Pain Syndrome? Myofascial pain syndrome is a chronic pain disorder that affects fascia (connective tissue that covers the muscles) and is characterized by muscle pain, tenderness, and spasm. The syndrome can involve a single muscle or a muscle group. Myofascial pain syndrome typically affects muscles in asymmetric areas of the body. The precise cause of the syndrome is unknown. As with most chronic pain conditions, associated symptoms may include poor sleep, fatigue, depression, and behavioral disturbances. In myofascial pain syndrome, there are focal tender points in muscles called trigger points. A trigger point is usually within a taut band of muscle that can be felt by the examiner. They can ultimately be identified by the examiner applying pressure with one to three fingers and the thumb. Patients usually exhibit a jump sign when a trigger point is pressed. The patient with a positive jump sign may wince, cry out, and withdraw from the pressure being applied by an examiner. The pain may be referred, or felt at a distance away from the area being compressed. Trigger points can occur in muscles, ligaments, fascia, and periosteum (the membrane surrounding a bone). Pain in trigger points can be made worse with activity or stress.  Currently, four types of trigger points can be distinguished:    -An active trigger point is an area of extreme tenderness usually within a taut muscle or muscle group  -A latent trigger point is an inactive area with the potential to act like a trigger point  -A secondary trigger point is a highly irritable area in a muscle or muscle group that can be activated due to a trigger point or overload in another muscle or muscle group  -A satellite trigger point is a highly irritable spot in a muscle or muscle group that becomes active because the muscle is in the region of another trigger point  Causes Of Myofascial Pain Syndrome    No one single factor can be identified as causing myofascial pain syndrome. The syndrome may develop from a muscle injury or excessive strain on a certain muscle or muscle group, ligament, or tendon. In addition, the following factors may be contributors:    Trauma to the discs between the backbones, or vertebrae  Inflammatory conditions  Lack of blood flow to heart muscle  Deconditioning from lack of exercise  General fatigue  Nutritional deficiencies  Hormonal changes  Obesity  Intense cooling of parts of the body  Use of tobacco  Repetitive motions  Overuse of a muscle  Alcohol or drug abuse  Long-term emotional stress  Treatments For Myofascial Pain Syndrome    Myofascial pain syndrome is best treated with a team of various medical professionals and various forms of therapies. The treatment team may consist of a primary care physician, a specialist in [address], nurses, physical and occupational therapists, massage therapists, and psychologists. The therapies may be carried out through drug and non-drug means. Current practice is combining non-drug therapies with short-term drug therapies for longer lasting and maximal benefits. Various drugs can be used in the treatment of myofascial pain syndrome. Non-steroidal anti-inflammatory drugs (NSAIDs) such as aspirin (Justina), ibuprofen (Advil), and naproxen sodium (Aleve) can be used short term to reduce acute pain and inflammation. Acetaminophen (Tylenol) and oral narcotics such as oxycodone and hydrocodone may also be used in the short term for pain relief. Tricyclic antidepressants such as trazodone (Desyrel) and amitriptyline (Elavil) can be used at bedtime to improve sleep as well as relieve pain. Muscle relaxants such as cyclobenzaprine (Flexeril) and carisoprodol (Soma) can be used to relax muscle spasm and improve sleep, as they can have a sedating effect.     Antidepressants such as fluoxetine (Prozac), sertraline (Zoloft), and duloxetine (Cymbalta) can be helpful with the chronic pain of myofascial pain syndrome. Anti-seizure medications such as gabapentin (Neurontin) and pregabalin (Lyrica) can also be helpful with the chronic pain of the syndrome. Capsaicin cream, a topical pain reliever derived from chili peppers, may also be helpful with the chronic pain of myofascial pain syndrome. Injections can also be utilized in the treatment of myofascial pain syndrome. Trigger point injections and botulinum toxin (Botox) injections are two areas of recent interest. Trigger point injections involve direct injection of a local anesthetic into the trigger point. This method is very effective when there are only a few precisely located trigger points. Botox can also be directly injected into trigger points. This method has produced inconsistent results. Various non-drug therapies can be utilized in the treatment of myofascial pain syndrome. Physical therapy is one of the best treatments for the syndrome. The stretch and spray method has also been used with some success for treatment of the syndrome. It involves spraying the muscle or muscle group containing the trigger point with a coolant, such as flourimethane, and then slowly stretching the muscle. Massage therapy is another non-drug treatment used in the treatment of the syndrome. Massage therapists exert ischemic compression, which is the application of progressively stronger pressure on a trigger point for the purpose of eliminating its tenderness. Conclusion    Myofascial pain syndrome is a chronic pain disorder that affects muscle and the fascia covering the muscle. Key to the diagnosis and treatment of the syndrome is the identification of trigger points, which are areas of extreme tenderness in bands of taut muscle. No one knows the exact cause of the disorder. The treatment of myofascial pain syndrome is best done by a multidisciplinary team utilizing various drug and non-drug therapies.  The combination of physical therapy, trigger point injections, and massage are routinely used with some success in the treatment of myofascial pain syndrome.     [email or web address]

## 2017-03-15 NOTE — MR AVS SNAPSHOT
Visit Information Date & Time Provider Department Dept. Phone Encounter #  
 3/15/2017  2:00 PM Camacho Edgar MD [facility] [phone] Follow-up Instructions Return in about 6 weeks (around 4/26/2017), or if symptoms worsen or fail to improve. Your Appointments 3/22/2017  2:00 PM  
Follow Up with Garima Perez MD  
[facility] ([facility]) Appt Note: follow up; Rescheduling Appointment From 03/08/2017 [facility] [address] [address] [address] [phone] Keanu Rizvi  
  
    
 3/31/2017  1:00 PM  
Follow Up with Caleb Pena MD  
[facility]) Appt Note: 3mon f/u  
 [address] [facility] [address]  
[phone]  
  
   
 [facility] [address]  
  
    
 4/26/2017  3:30 PM  
Follow Up with Camacho Edgar MD  
[address] [facility], [address] [facility]) Appt Note: 6 wk p.t. back + neck  fu no copay [address] [facility] [id number] [phone]  
  
   
 [address] [facility] [address] 7/11/2017  4:15 PM  
ROUTINE CARE with Ellen Kurtz MD  
[facility] ([facility]) Appt Note: routine f/u 5mo [address] [facility] [address] [facility] [address]  
[address] [facility] [address] [facility] [address] Upcoming Health Maintenance Date Due  
 PAP AKA CERVICAL CYTOLOGY 8/21/2018 DTaP/Tdap/Td series (2 - Td) 10/25/2026 Allergies as of 3/15/2017  Review Complete On: 3/15/2017 By: Jose Vaz Severity Noted Reaction Type Reactions Ace Inhibitors  09/26/2012    Swelling Current Immunizations  Reviewed on 10/25/2016 Name Date Influenza Vaccine (Quad) PF 10/25/2016 Influenza Vaccine PF 12/11/2013 Pneumococcal Polysaccharide (PPSV-23) 10/25/2016 Tdap 10/25/2016 Not reviewed this visit You Were Diagnosed With   
  
 Codes Comments Myofascial pain    -  Primary ICD-10-CM: M79.1 ICD-9-CM: 729.1 Occipital neuralgia, unspecified laterality     ICD-10-CM: M54.81 ICD-9-CM: 723.8 Facet syndrome     ICD-10-CM: M12.88 ICD-9-CM: 724.8 Neck pain     ICD-10-CM: M54.2 ICD-9-CM: 723.1 Pain of lumbar spine     ICD-10-CM: M54.5 ICD-9-CM: 724.2 Thoracic spine pain     ICD-10-CM: M54.6 ICD-9-CM: 724.1 Vitals BP Pulse Temp Resp Height(growth percentile) Weight(growth percentile) (!) 167/97 91 98.4 °F (36.9 °C) (Oral) 18 5' 3\" (1.6 m) 244 lb 12.8 oz (111 kg) BMI OB Status Smoking Status 43.36 kg/m2 Having regular periods Current Every Day Smoker BMI and BSA Data Body Mass Index Body Surface Area  
 43.36 kg/m 2 2.22 m 2 Preferred Pharmacy Pharmacy Name Phone [facility] [address] [phone] Your Updated Medication List  
  
   
This list is accurate as of: 3/15/17  4:44 PM.  Always use your most recent med list.  
  
  
  
  
 albuterol 90 mcg/actuation inhaler Commonly known as:  Marissa Deep Take 2 Puffs by inhalation every four (4) hours as needed for Wheezing. amLODIPine 10 mg tablet Commonly known as:  Vicenta Spruce TAKE ONE TABLET BY MOUTH ONCE DAILY  
  
 atorvastatin 20 mg tablet Commonly known as:  LIPITOR Take 1 Tab by mouth nightly. betamethasone 6 mg/mL injection Commonly known as:  CELESTONE SOLUSPAN  
2 mL by IntraMUSCular route once for 1 dose. bupivacaine 0.25 % (2.5 mg/mL) Soln injection Commonly known as:  MARCAINE  
1 mL by IntraMUSCular route once for 1 dose. buPROPion  mg tablet Commonly known as:  Haley Bianchi  
 Take 150 mg by mouth every morning. butalbital-acetaminophen-caff -40 mg per capsule Commonly known as:  [employer] Take 1 tablet every 8 hours as needed for headache  
  
 carvedilol 25 mg tablet Commonly known as:  Haskel Scarce Take 1 Tab by mouth two (2) times daily (with meals). cloNIDine HCl 0.1 mg tablet Commonly known as:  CATAPRES Take 1 Tab by mouth two (2) times a day. desloratadine 5 mg tablet Commonly known as:  CLARINEX Take 1 Tab by mouth daily as needed for Allergies. diclofenac EC 75 mg EC tablet Commonly known as:  VOLTAREN Take 1 Tab by mouth two (2) times daily as needed. ergocalciferol 50,000 unit capsule Commonly known as:  ERGOCALCIFEROL Take 1 Cap by mouth every seven (7) days. * ferrous sulfate 325 mg (65 mg iron) tablet Take  by mouth daily. * ferrous sulfate 325 mg (65 mg iron) tablet TAKE ONE TABLET BY MOUTH ONCE DAILY BEFORE BREAKFAST  
  
 gabapentin 100 mg capsule Commonly known as:  NEURONTIN  
1 twice a day for 2 days then 1 three times a day for three days then 2 three times a day for 7 days then 3 three times a day GARLIC PO Take  by mouth.  
  
 lidocaine (PF) 20 mg/mL (2 %) injection Commonly known as:  XYLOCAINE  
1 mL by Other route once for 1 dose. polyethylene glycol 17 gram/dose powder Commonly known as:  Benson Alfredo Take 17 g by mouth daily. One to two caps daily  
  
 sertraline 50 mg tablet Commonly known as:  ZOLOFT Take 75 mg by mouth nightly. * Notice: This list has 2 medication(s) that are the same as other medications prescribed for you. Read the directions carefully, and ask your doctor or other care provider to review them with you. Prescriptions Sent to Pharmacy Refills  
 diclofenac EC (VOLTAREN) 75 mg EC tablet 5 Sig: Take 1 Tab by mouth two (2) times daily as needed.   
 Class: Normal  
 Pharmacy: [address]  #: [phone] Route: Oral  
  
We Performed the Following AMB POC XRAY, SPINE, CERVICAL; 2 OR 3 [14942 CPT(R)] AMB POC XRAY, SPINE, LUMBOSACRAL; 2 O [08549 CPT(R)] AMB POC XRAY, SPINE; THORACIC, 2 VIEW [55125 CPT(R)] BETAMETHASONE ACETATE & SODIUM PHOSPHATE INJECTION 3 MG EA. [ [facility]] INJECTION, BUPIVICAINE HYDRO [ [facility]] LIDOCAINE INJECTION [ [facility]] [place] INJECT TRIGGER POINTS, > 3 M8779493 CPT(R)] REFERRAL TO PHYSICAL THERAPY [RDW59 Custom] Comments:  
 eval and treat, dry needling if indicated, consider Pilates equipment based exercises; Patient has cervical, thoracic, and lumbar myofascial pain Follow-up Instructions Return in about 6 weeks (around 4/26/2017), or if symptoms worsen or fail to improve. Referral Information Referral ID Referred By Referred To  
  
 [id number] DOC Aldrich Not Available Visits Status Start Date End Date 1 New Request 3/15/17 3/15/18 If your referral has a status of pending review or denied, additional information will be sent to support the outcome of this decision. Patient Instructions What Is Myofascial Pain Syndrome? Myofascial pain syndrome is a chronic pain disorder that affects fascia (connective tissue that covers the muscles) and is characterized by muscle pain, tenderness, and spasm. The syndrome can involve a single muscle or a muscle group. Myofascial pain syndrome typically affects muscles in asymmetric areas of the body. The precise cause of the syndrome is unknown. As with most chronic pain conditions, associated symptoms may include poor sleep, fatigue, depression, and behavioral disturbances. In myofascial pain syndrome, there are focal tender points in muscles called trigger points.  A trigger point is usually within a taut band of muscle that can be felt by the examiner. They can ultimately be identified by the examiner applying pressure with one to three fingers and the thumb. Patients usually exhibit a jump sign when a trigger point is pressed. The patient with a positive jump sign may wince, cry out, and withdraw from the pressure being applied by an examiner. The pain may be referred, or felt at a distance away from the area being compressed. Trigger points can occur in muscles, ligaments, fascia, and periosteum (the membrane surrounding a bone). Pain in trigger points can be made worse with activity or stress. Currently, four types of trigger points can be distinguished: 
 
-An active trigger point is an area of extreme tenderness usually within a taut muscle or muscle group 
-A latent trigger point is an inactive area with the potential to act like a trigger point 
-A secondary trigger point is a highly irritable area in a muscle or muscle group that can be activated due to a trigger point or overload in another muscle or muscle group 
-A satellite trigger point is a highly irritable spot in a muscle or muscle group that becomes active because the muscle is in the region of another trigger point Causes Of Myofascial Pain Syndrome No one single factor can be identified as causing myofascial pain syndrome. The syndrome may develop from a muscle injury or excessive strain on a certain muscle or muscle group, ligament, or tendon. In addition, the following factors may be contributors: 
 
Trauma to the discs between the backbones, or vertebrae Inflammatory conditions Lack of blood flow to heart muscle Deconditioning from lack of exercise General fatigue Nutritional deficiencies Hormonal changes Obesity Intense cooling of parts of the body Use of tobacco 
Repetitive motions Overuse of a muscle Alcohol or drug abuse Long-term emotional stress Treatments For Myofascial Pain Syndrome Myofascial pain syndrome is best treated with a team of various medical professionals and various forms of therapies. The treatment team may consist of a primary care physician, a specialist in [address] [facility], nurses, physical and occupational therapists, massage therapists, and psychologists. The therapies may be carried out through drug and non-drug means. Current practice is combining non-drug therapies with short-term drug therapies for longer lasting and maximal benefits. Various drugs can be used in the treatment of myofascial pain syndrome. Non-steroidal anti-inflammatory drugs (NSAIDs) such as aspirin (Justina), ibuprofen (Advil), and naproxen sodium (Aleve) can be used short term to reduce acute pain and inflammation. Acetaminophen (Tylenol) and oral narcotics such as oxycodone and hydrocodone may also be used in the short term for pain relief. Tricyclic antidepressants such as trazodone (Desyrel) and amitriptyline (Elavil) can be used at bedtime to improve sleep as well as relieve pain. Muscle relaxants such as cyclobenzaprine (Flexeril) and carisoprodol (Soma) can be used to relax muscle spasm and improve sleep, as they can have a sedating effect. Antidepressants such as fluoxetine (Prozac), sertraline (Zoloft), and duloxetine (Cymbalta) can be helpful with the chronic pain of myofascial pain syndrome. Anti-seizure medications such as gabapentin (Neurontin) and pregabalin (Lyrica) can also be helpful with the chronic pain of the syndrome. Capsaicin cream, a topical pain reliever derived from chili peppers, may also be helpful with the chronic pain of myofascial pain syndrome. Injections can also be utilized in the treatment of myofascial pain syndrome. Trigger point injections and botulinum toxin (Botox) injections are two areas of recent interest. Trigger point injections involve direct injection of a local anesthetic into the trigger point.  This method is very effective when there are only a few precisely located trigger points. Botox can also be directly injected into trigger points. This method has produced inconsistent results. Various non-drug therapies can be utilized in the treatment of myofascial pain syndrome. Physical therapy is one of the best treatments for the syndrome. The stretch and spray method has also been used with some success for treatment of the syndrome. It involves spraying the muscle or muscle group containing the trigger point with a coolant, such as flourimethane, and then slowly stretching the muscle. Massage therapy is another non-drug treatment used in the treatment of the syndrome. Massage therapists exert ischemic compression, which is the application of progressively stronger pressure on a trigger point for the purpose of eliminating its tenderness. Conclusion Myofascial pain syndrome is a chronic pain disorder that affects muscle and the fascia covering the muscle. Key to the diagnosis and treatment of the syndrome is the identification of trigger points, which are areas of extreme tenderness in bands of taut muscle. No one knows the exact cause of the disorder. The treatment of myofascial pain syndrome is best done by a multidisciplinary team utilizing various drug and non-drug therapies. The combination of physical therapy, trigger point injections, and massage are routinely used with some success in the treatment of myofascial pain syndrome. [email or web address]  
 
  
Introducing [address]! Dear Brian De La O: Thank you for requesting a [employer] account. Our records indicate that you already have an active [employer] account. You can access your account anytime at [email or web address] Did you know that you can access your hospital and ER discharge instructions at any time in [employer]?   You can also review all of your test results from your hospital stay or ER visit. Additional Information If you have questions, please visit the Frequently Asked Questions section of the [employer] website at [email or web address] Remember, [employer] is NOT to be used for urgent needs. For medical emergencies, dial 911. Now available from your iPhone and Android! Please provide this summary of care documentation to your next provider. Your primary care clinician is listed as Stacie Ch. If you have any questions after today's visit, please call [phone].

## 2017-03-21 ENCOUNTER — HOSPITAL ENCOUNTER (OUTPATIENT)
Dept: PHYSICAL THERAPY | Age: 42
Discharge: HOME | End: 2017-03-21
Payer: MEDICAID

## 2017-03-21 PROCEDURE — 97162 PT EVAL MOD COMPLEX 30 MIN: CPT

## 2017-03-21 NOTE — PROGRESS NOTES
PT DAILY TREATMENT NOTE     Patient Name: Robinson Reed  Date:3/21/2017  : 1975  [x]  Patient  Verified  Payor: [place]ICAID [place] / Plan: 1500 / Product Type: Medicaid /    In time:1:45  Out time:2:30  Total Treatment Time (min): 45  Visit #: 1 of 8    Treatment Area: Cervicalgia [M54.2]  Low back pain [M54.5]  Pain in thoracic spine [M54.6]    SUBJECTIVE  Pain Level (0-10 scale): 4  Any medication changes, allergies to medications, adverse drug reactions, diagnosis change, or new procedure performed?: [x] No    [] Yes (see summary sheet for update)  Subjective functional status/changes:   [] No changes reported       OBJECTIVE    Modality rationale:    Min Type Additional Details    [] Estim:  []Unatt       []IFC  []Premod                        []Other:  []w/ice   []w/heat  Position:  Location:    [] Estim: []Att    []TENS instruct  []NMES                    []Other:  []w/US   []w/ice   []w/heat  Position:  Location:    []  Traction: [] Cervical       []Lumbar                       [] Prone          []Supine                       []Intermittent   []Continuous Lbs:  [] before manual  [] after manual    []  Ultrasound: []Continuous   [] Pulsed                           []1MHz   []3MHz W/cm2:  Location:    []  Iontophoresis with dexamethasone         Location: [] Take home patch   [] In clinic    []  Ice     []  heat  []  Ice massage  []  Laser   []  Anodyne Position:  Location:    []  Laser with stim  []  Other:  Position:  Location:    []  Vasopneumatic Device Pressure:       [] lo [] med [] hi   Temperature: [] lo [] med [] hi   [] Skin assessment post-treatment:  []intact []redness- no adverse reaction    []redness  adverse reaction:     40 min [x]Eval                  []Re-Eval       5 min Therapeutic Exercise:  [] See flow sheet : HEP   Rationale: increase ROM and increase strength to improve the patients ability to perform ADL              With   [] TE   [] TA [] neuro   [] other: Patient Education: [x] Review HEP    [] Progressed/Changed HEP based on:   [] positioning   [] body mechanics   [] transfers   [] heat/ice application    [] other:      Other Objective/Functional Measures:       Pain Level (0-10 scale) post treatment: 4    ASSESSMENT/Changes in Function:      Patient will continue to benefit from skilled PT services to modify and progress therapeutic interventions, address functional mobility deficits, address ROM deficits, address strength deficits, analyze and address soft tissue restrictions, analyze and cue movement patterns and assess and modify postural abnormalities to attain remaining goals.      [x]  See Plan of Care  []  See progress note/recertification  []  See Discharge Summary         Progress towards goals / Updated goals:  Per POC    PLAN  []  Upgrade activities as tolerated     [x]  Continue plan of care  []  Update interventions per flow sheet       []  Discharge due to:_  []  Other:_      Rancho Singh, PT 3/21/2017  2:29 PM    Future Appointments  Date Time Provider [facility]   3/22/2017 2:00 PM Ike Clemons MD [address] [place] [address]   3/31/2017 1:00 PM Miriam Tinsley  [facility]   4/26/2017 3:30 PM Rachell Rg  [address]   7/11/2017 4:15 PM Shadia Walker MD [place] [address]

## 2017-03-21 NOTE — PROGRESS NOTES
[facility]  [address]  [place], [address]  [phone] fax    Plan of Care/ Statement of Necessity for Physical Therapy Services    Patient name: Raheel Child Start of Care: 3/21/2017   Referral source: Nevaeh Venegas MD : 1975    Medical Diagnosis: Cervicalgia [M54.2]  Low back pain [M54.5]  Pain in thoracic spine [M54.6]   Onset Date:chronic    Treatment Diagnosis: chronic neck and back pain   Prior Hospitalization: see medical history Provider#: [id number]   Medications: Verified on Patient summary List    Comorbidities: depression, HTN, +tobacco use, asthma, scoliosis, alcohol use   Prior Level of Function: functionally independent with activities     The Plan of Care and following information is based on the information from the initial evaluation. Assessment/ key information: 36 y/o female presents with long h/o neck and back pain. Pt reports pain has increased recently, which she contributes to gaining significant weight from birth control injections. Pt demonstrates limited lumbar extension with pain and pain with B L/S rotation. C/s AROM limited with B SB and R rotation. +pain with rotation. She also has c/o chronic migraines and reports recent occipital rblock injection with relief. Pt demonstrates decreased posture, + myosfascial restrictions B UT and SO muscles. Decreased mobility of the t/s. Pt demonstrates limited core stability and decreased B gluteal strength. Pt will benefit from PT to address the aforementioned impairments. Evaluation Complexity History MEDIUM  Complexity : 1-2 comorbidities / personal factors will impact the outcome/ POC ; Examination MEDIUM Complexity : 3 Standardized tests and measures addressing body structure, function, activity limitation and / or participation in recreation  ;Presentation MEDIUM Complexity : Evolving with changing characteristics  ; Clinical Decision Making MEDIUM Complexity : FOTO score of 26-74  Overall Complexity Rating: MEDIUM  Problem List: pain affecting function, decrease ROM, decrease strength, decrease ADL/ functional abilitiies, decrease activity tolerance and decrease flexibility/ joint mobility   Treatment Plan may include any combination of the following: Therapeutic exercise, Therapeutic activities, Neuromuscular re-education, Physical agent/modality, Manual therapy and Patient education  Patient / Family readiness to learn indicated by: asking questions and interest  Persons(s) to be included in education: patient (P)  Barriers to Learning/Limitations: yes;  financial  Patient Goal (s): To help with the pain  Patient Self Reported Health Status: good  Rehabilitation Potential: good    Short Term Goals: To be accomplished in 1 weeks:   1. Pt will be I and compliant with HEP  Long Term Goals: To be accomplished in 4 weeks:   1. improve c/s FOTO to 60 for improved ability for functional tasks   2. Improved L/S FOTO to 49 for improved ability for daily activities   3. Pt will demonstrate at least 55 degrees of R c/s rotation for ease with functional activity. 4. Pt will report being able to put her shoes on without difficulty. Frequency / Duration: Patient to be seen 2 times per week for 4 weeks. Patient/ Caregiver education and instruction: Diagnosis, prognosis, self care and exercises   [x]  Plan of care has been reviewed with CIELO Matthew, PT 3/21/2017 2:32 PM    ________________________________________________________________________    I certify that the above Therapy Services are being furnished while the patient is under my care. I agree with the treatment plan and certify that this therapy is necessary.     [facility] Signature:____________________  Date:____________Time: _________    Please sign and return to [facility] [address] [address]  [place], [address]  [phone] fax

## 2017-03-22 ENCOUNTER — OFFICE VISIT (OUTPATIENT)
Dept: CARDIOLOGY CLINIC | Age: 42
End: 2017-03-22

## 2017-03-22 VITALS
DIASTOLIC BLOOD PRESSURE: 100 MMHG | WEIGHT: 245 LBS | HEIGHT: 63 IN | BODY MASS INDEX: 43.41 KG/M2 | SYSTOLIC BLOOD PRESSURE: 158 MMHG | HEART RATE: 96 BPM | OXYGEN SATURATION: 96 %

## 2017-03-22 DIAGNOSIS — E78.00 PURE HYPERCHOLESTEROLEMIA: ICD-10-CM

## 2017-03-22 DIAGNOSIS — I10 ESSENTIAL HYPERTENSION: Primary | ICD-10-CM

## 2017-03-22 RX ORDER — ATORVASTATIN CALCIUM 20 MG/1
20 TABLET, FILM COATED ORAL
Qty: 30 TAB | Refills: 11 | Status: SHIPPED | OUTPATIENT
Start: 2017-03-22 | End: 2017-07-18

## 2017-03-22 RX ORDER — AMLODIPINE BESYLATE 10 MG/1
10 TABLET ORAL DAILY
Qty: 30 TAB | Refills: 11 | Status: SHIPPED | OUTPATIENT
Start: 2017-03-22 | End: 2018-05-31 | Stop reason: SDUPTHER

## 2017-03-22 NOTE — MR AVS SNAPSHOT
Visit Information Date & Time Provider Department Dept. Phone Encounter #  
 3/22/2017  2:00 PM Riri Brand MD [facility] [address] [id number] Your Appointments 3/31/2017  1:00 PM  
Follow Up with Sherley Hoffman MD  
[address] [facility] [address] [facility]) Appt Note: 3mon f/u  
 [address] [facility] [address] [facility] [id number] [phone]  
  
   
 [facility] [id number]  
  
    
 4/26/2017  3:30 PM  
Follow Up with Annette Schwab MD  
[address] [facility] and [facility]) Appt Note: 6 wk p.t. back + neck  fu no copay [address] [facility] [address]  
[phone]  
  
   
 [address] [facility] [address] 7/11/2017  4:15 PM  
ROUTINE CARE with Anny Prabhakar MD  
[facility] ([facility]) Appt Note: routine f/u 5mo [address] [facility] [address]  
[address] [facility] [address] [facility] [address] Upcoming Health Maintenance Date Due  
 PAP AKA CERVICAL CYTOLOGY 8/21/2018 DTaP/Tdap/Td series (2 - Td) 10/25/2026 Allergies as of 3/22/2017  Review Complete On: 3/22/2017 By: Jessika Almonte LPN Severity Noted Reaction Type Reactions Ace Inhibitors  09/26/2012    Swelling Current Immunizations  Reviewed on 10/25/2016 Name Date Influenza Vaccine (Quad) PF 10/25/2016 Influenza Vaccine PF 12/11/2013 Pneumococcal Polysaccharide (PPSV-23) 10/25/2016 Tdap 10/25/2016 Not reviewed this visit You Were Diagnosed With   
  
 Codes Comments Pure hypercholesterolemia    -  Primary ICD-10-CM: E78.00 ICD-9-CM: 272.0 Essential hypertension     ICD-10-CM: I10 
ICD-9-CM: 401.9 Vitals BP Pulse Height(growth percentile) Weight(growth percentile) SpO2 BMI (!) 158/100 96 5' 3\" (1.6 m) 245 lb (111.1 kg) 96% 43.4 kg/m2 OB Status Smoking Status Having regular periods Current Every Day Smoker BMI and BSA Data Body Mass Index Body Surface Area  
 43.4 kg/m 2 2.22 m 2 Preferred Pharmacy Pharmacy [facility] Phone [facility] [address] [phone] Your Updated Medication List  
  
   
This list is accurate as of: 3/22/17  3:08 PM.  Always use your most recent med list.  
  
  
  
  
 albuterol 90 mcg/actuation inhaler Commonly known as:  Trey Varela Take 2 Puffs by inhalation every four (4) hours as needed for Wheezing. amLODIPine 10 mg tablet Commonly known as:  Arlyss Smoot Take 1 Tab by mouth daily. atorvastatin 20 mg tablet Commonly known as:  LIPITOR Take 1 Tab by mouth nightly. buPROPion  mg tablet Commonly known as:  Esdras Steve Take 150 mg by mouth every morning. butalbital-acetaminophen-caff -40 mg per capsule Commonly known as:  [employer] Take 1 tablet every 8 hours as needed for headache  
  
 carvedilol 25 mg tablet Commonly known as:  Matos Servant Take 1 Tab by mouth two (2) times daily (with meals). cloNIDine HCl 0.1 mg tablet Commonly known as:  CATAPRES Take 1 Tab by mouth two (2) times a day. desloratadine 5 mg tablet Commonly known as:  CLARINEX Take 1 Tab by mouth daily as needed for Allergies. diclofenac EC 75 mg EC tablet Commonly known as:  VOLTAREN Take 1 Tab by mouth two (2) times daily as needed. ergocalciferol 50,000 unit capsule Commonly known as:  ERGOCALCIFEROL Take 1 Cap by mouth every seven (7) days. * ferrous sulfate 325 mg (65 mg iron) tablet Take  by mouth daily. * ferrous sulfate 325 mg (65 mg iron) tablet TAKE ONE TABLET BY MOUTH ONCE DAILY BEFORE BREAKFAST  
  
 gabapentin 100 mg capsule Commonly known as:  NEURONTIN  
 1 twice a day for 2 days then 1 three times a day for three days then 2 three times a day for 7 days then 3 three times a day GARLIC PO Take  by mouth.  
  
 polyethylene glycol 17 gram/dose powder Commonly known as:  Luis Sport Take 17 g by mouth daily. One to two caps daily  
  
 sertraline 50 mg tablet Commonly known as:  ZOLOFT Take 75 mg by mouth nightly. * Notice: This list has 2 medication(s) that are the same as other medications prescribed for you. Read the directions carefully, and ask your doctor or other care provider to review them with you. Prescriptions Printed Refills  
 amLODIPine (NORVASC) 10 mg tablet 11 Sig: Take 1 Tab by mouth daily. Class: Print Route: Oral  
 atorvastatin (LIPITOR) 20 mg tablet 11 Sig: Take 1 Tab by mouth nightly. Class: Print Route: Oral  
  
We Performed the Following AMB POC EKG ROUTINE W/ 12 LEADS, INTER & REP [48729 CPT(R)] To-Do List   
 03/23/2017 12:30 PM  
  Appointment with Ping Griffin PT at [facility] PT [address] [place] [address] ([phone])  
  
 03/27/2017 12:30 PM  
  Appointment with Philip Garcia PTA at [facility] PT [address] [place] [address] ([phone])  
  
 03/29/2017 11:00 AM  
  Appointment with Ping Griffin PT at [facility] PT [address] [place] [address] ([phone])  
  
 04/04/2017 11:30 AM  
  Appointment with Linda Brannon PTA at [address] ([phone])  
  
 04/06/2017 12:00 PM  
  Appointment with Servando Oakes PT at [address] ([phone])  
  
 04/12/2017 11:00 AM  
  Appointment with Ping Griffin PT at [address] ([phone])  
  
 04/14/2017 11:30 AM  
  Appointment with Ping Griffin PT at [address] ([phone]) \[facility] [place] [facility]\"" & [facility] SERVICES! Dear Santa Vancer: Thank you for requesting a MyChart account. Our records indicate that you already have an active [employer] account. You can access your account anytime at [email or web address] Did you know that you can access your hospital and ER discharge instructions at any time in "[employer]? You can also review all of your test results from your hospital stay or ER visit. Additional Information If you have questions, please visit the Frequently Asked Questions section of the "[employer] website at [email or web address] Remember, "[employer] is NOT to be used for urgent needs. For medical emergencies, dial 911. Now available from your iPhone and Android! Please provide this summary of care documentation to your next provider. Your primary care clinician is listed as Nelly Pisano. If you have any questions after today's visit, please call [phone].

## 2017-03-23 ENCOUNTER — HOSPITAL ENCOUNTER (OUTPATIENT)
Dept: PHYSICAL THERAPY | Age: 42
Discharge: HOME | End: 2017-03-23
Payer: MEDICAID

## 2017-03-23 PROCEDURE — 97112 NEUROMUSCULAR REEDUCATION: CPT

## 2017-03-23 PROCEDURE — 97110 THERAPEUTIC EXERCISES: CPT

## 2017-03-23 NOTE — PROGRESS NOTES
PT DAILY TREATMENT NOTE 3-16    Patient Name: Miguel A Moore  Date:3/23/2017  : 1975  [x]  Patient  Verified  Payor: [place] / Plan:     / Product Type: Medicaid /    In time:1235  Out time:1320  Total Treatment Time (min): 45  Visit #: 2 of 8    Treatment Area: Cervicalgia [M54.2]  Low back pain [M54.5]  Pain in thoracic spine [M54.6]    SUBJECTIVE  Pain Level (0-10 scale): 5  Any medication changes, allergies to medications, adverse drug reactions, diagnosis change, or new procedure performed?: [x] No    [] Yes (see summary sheet for update)  Subjective functional status/changes:   [] No changes reported  Pt reports focusing on her posture more. OBJECTIVE     min []Eval                  []Re-Eval     35 min Therapeutic Exercise:  [x] See flow sheet :   Rationale: increase ROM and increase strength to improve the patients ability to improve posture    10 min Neuromuscular Re-education:  [x]  See flow sheet :   Rationale: increase strength, improve coordination and increase proprioception  to improve the patients ability to improve stability              With   [] TE   [] TA   [] neuro   [] other: Patient Education: [x] Review HEP    [] Progressed/Changed HEP based on:   [] positioning   [] body mechanics   [] transfers   [] heat/ice application    [] other:      Other Objective/Functional Measures:   Compliant with HEP     Pain Level (0-10 scale) post treatment: 3    ASSESSMENT/Changes in Function:   Pt reports loosened feeling along her lower back after exercises. She demonstrates fatigue with glute activation exercises, and focuses well on correctly engaging musculature prior to initiating movement. She required rest breaks during exercises.     Patient will continue to benefit from skilled PT services to modify and progress therapeutic interventions, address functional mobility deficits, address ROM deficits, address strength deficits, analyze and address soft tissue restrictions, analyze and cue movement patterns, analyze and modify body mechanics/ergonomics, assess and modify postural abnormalities and instruct in home and community integration to attain remaining goals. []  See Plan of Care  []  See progress note/recertification  []  See Discharge Summary         Progress towards goals / Updated goals:  Short Term Goals: To be accomplished in 1 weeks:  1. Pt will be I and compliant with HEP. Met per pt report 03/23/2017  Long Term Goals: To be accomplished in 4 weeks:  1. improve c/s FOTO to 60 for improved ability for functional tasks  2. Improved L/S FOTO to 49 for improved ability for daily activities  3. Pt will demonstrate at least 55 degrees of R c/s rotation for ease with functional activity. 4. Pt will report being able to put her shoes on without difficulty.      PLAN  [x]  Upgrade activities as tolerated     [x]  Continue plan of care  [x]  Update interventions per flow sheet       []  Discharge due to:_  []  Other:_      Damion Guardado, PT 3/23/2017  12:53 PM    Future Appointments  Date Time Provider Ras Leyvai   3/27/2017 12:30 PM Dedra Morris, PTA [facility]   3/29/2017 11:00 AM Damion Guardado, PT [facility]   3/31/2017 1:00 PM Uyen Troncoso  E [facility]   4/4/2017 11:30 AM Evelin Moody, PTA [facility]   4/6/2017 12:00 PM [facility], PT [facility]   4/12/2017 11:00 AM Damion Guardado, PT [facility]   4/14/2017 11:30 AM Damion Guardado, PT [facility]   4/26/2017 3:30 PM Anjel Da Silva  [address]   7/11/2017 4:15 PM Arnulfo Henry MD [facility] SCHED

## 2017-03-27 ENCOUNTER — HOSPITAL ENCOUNTER (OUTPATIENT)
Dept: PHYSICAL THERAPY | Age: 42
Discharge: HOME | End: 2017-03-27
Payer: MEDICAID

## 2017-03-27 PROCEDURE — 97112 NEUROMUSCULAR REEDUCATION: CPT

## 2017-03-27 PROCEDURE — 97110 THERAPEUTIC EXERCISES: CPT

## 2017-03-27 NOTE — PROGRESS NOTES
PT DAILY TREATMENT NOTE 3-16    Patient Name: Barbie Larsen  Date:3/27/2017  : 1975  [x]  Patient  Verified  Payor: [place] / Plan: 1500 / Product Type: Medicaid /    In time:3:45  Out time:4:30  Total Treatment Time (min): 45  Visit #: 3 of 8    Treatment Area: Cervicalgia [M54.2]  Low back pain [M54.5]  Pain in thoracic spine [M54.6]    SUBJECTIVE  Pain Level (0-10 scale): 6-7/10  Any medication changes, allergies to medications, adverse drug reactions, diagnosis change, or new procedure performed?: [x] No    [] Yes (see summary sheet for update)  Subjective functional status/changes:   [] No changes reported  Patient reports no new complaints.      OBJECTIVE  Modality rationale: decrease pain and increase tissue extensibility to improve the patients ability to perform ADLs   Min Type Additional Details    [] Estim:  []Unatt       []IFC  []Premod                        []Other:  []w/ice   []w/heat  Position:  Location:    [] Estim: []Att    []TENS instruct  []NMES                    []Other:  []w/US   []w/ice   []w/heat  Position:  Location:    []  Traction: [] Cervical       []Lumbar                       [] Prone          []Supine                       []Intermittent   []Continuous Lbs:  [] before manual  [] after manual    []  Ultrasound: []Continuous   [] Pulsed                           []1MHz   []3MHz Location:  W/cm2:    []  Iontophoresis with dexamethasone         Location: [] Take home patch   [] In clinic   10 []  Ice     [x]  heat  []  Ice massage  []  Laser   []  Anodyne Position:supine  Location: mid/lower back    []  Laser with stim  []  Other: Position:  Location:    []  Vasopneumatic Device Pressure:       [] lo [] med [] hi   Temperature: [] lo [] med [] hi   [] Skin assessment post-treatment:  []intact []redness- no adverse reaction    []redness  adverse reaction:     27 min Therapeutic Exercise:  [x] See flow sheet :   Rationale: increase ROM, increase strength and improve coordination to improve the patients ability to perform functional tasks    8 min Neuromuscular Re-education:  [x]  See flow sheet :   Rationale: increase strength, improve coordination and increase proprioception  to improve the patients ability to perform ADLs              With   [] TE   [] TA   [] neuro   [] other: Patient Education: [x] Review HEP    [] Progressed/Changed HEP based on:   [] positioning   [] body mechanics   [] transfers   [] heat/ice application    [] other:      Other Objective/Functional Measures:   Increased wall Y/T/W x15  Increased wall angels x15     Pain Level (0-10 scale) post treatment: 0    ASSESSMENT/Changes in Function: Patient presents with moderate instability during Tracey SLS pull downs while standing on left LE, intermittently places opposite LE down to regain balance. Pain level decreases significantly pre and post treatment from 6/10 to 0/10. Patient will continue to benefit from skilled PT services to modify and progress therapeutic interventions, address functional mobility deficits, address ROM deficits, address strength deficits, analyze and address soft tissue restrictions, analyze and cue movement patterns, analyze and modify body mechanics/ergonomics and assess and modify postural abnormalities to attain remaining goals. []  See Plan of Care  []  See progress note/recertification  []  See Discharge Summary           Progress towards goals / Updated goals:  Short Term Goals: To be accomplished in 1 weeks:  1. Pt will be I and compliant with HEP. Met per pt report 03/23/2017  Long Term Goals: To be accomplished in 4 weeks:  1. improve c/s FOTO to 60 for improved ability for functional tasks  2. Improved L/S FOTO to 49 for improved ability for daily activities  3. Pt will demonstrate at least 55 degrees of R c/s rotation for ease with functional activity.    4. Pt will report being able to put her shoes on without difficulty.   PLAN  []  Upgrade activities as tolerated     [x]  Continue plan of care  []  Update interventions per flow sheet       []  Discharge due to:_  []  Other:_      Shorty Awan 3/27/2017  2:42 PM    Future Appointments  Date Time Provider Ras Keely   3/27/2017 3:30 PM Shorty Lv [facility]   3/29/2017 11:00 AM Alethea Allen, PT [facility]   3/31/2017 1:00 PM Jessica Stout  E [facility]   4/4/2017 11:30 AM Carine Pozo, PTA [facility]   4/6/2017 12:00 PM Eyal Burden, PT [facility]   4/12/2017 11:00 AM Alethea Allen, PT [facility]   4/14/2017 11:30 AM Alethea Allen, PT [facility]   4/26/2017 3:30 PM Eleni Hooper  [address]   7/11/2017 4:15 PM Theresa Mace MD [facility] SCHED

## 2017-03-29 ENCOUNTER — APPOINTMENT (OUTPATIENT)
Dept: PHYSICAL THERAPY | Age: 42
End: 2017-03-29
Payer: MEDICAID

## 2017-03-29 NOTE — PROGRESS NOTES
PATIENT NAME: Jonathan Manzano         35 y.o.      1975              DATE:3/22/2017    REASON FOR VISIT: Hypertension. Shortness of breath. HISTORY OF PRESENT ILLNESS: Metoprolol and losartan were discontinued last visit. Carvedilol was begun in a dose of 25 mg twice daily and clonidine and a dose of 0.1 mg twice daily. Blood pressure control has been excellent. She has not taken her Norvasc for the past 4 days because of problems getting the prescription refilled. When she was taking the Norvasc along with her other antihypertensives blood pressures have been normal.  Anterior cervical discomfort resolved. Shortness of breath resolved. Cardiolite scan remarkable for a fixed apical defect. No reversible defects seen. PAST MEDICAL HISTORY:   Past Medical History:  No date: Abnormal uterine bleeding (AUB)  No date: Anxiety and depression  No date: Asthma  No date: GERD (gastroesophageal reflux disease)  No date: History of blood transfusion  1/2014: HSV-2 (herpes simplex virus 2) infection      Comment: CSF   04/25/2016: Hx of colonoscopy      Comment: internal hemorrhoids, no polyp dr Emerson Pop repeat               in 10 years. 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 pylori test      Comment: placed on antibiotics and H2 blocker 6/11/14  No date: PPD positive, treated  No date: Tuberculosis      Comment: denies, 4/14/16 6/9/14: Vitamin D deficiency    PAST SURGICAL HISTORY:   Past Surgical History:  No date: ENDOMETRIAL CRYOABLATION  04/25/2016: HX COLONOSCOPY      Comment: DR. ESTRADA REPEAT IN 2026 (10 YEARS)  1981: HX HERNIA REPAIR      Comment: umbilical      SOCIAL HISTORY:  Social History    Marital status:              Spouse name:                       Years of education:                 Number of children:               Social History Main Topics    Smoking status: Current Every Day Smoker                                                     Packs/day: 0.50      Years: 0.50        Smokeless status: Never Used                        Alcohol use: Yes           1.2 oz/week       2 Cans of beer per week       Comment: monthly 1 or 2    Drug use: Yes                Special: Marijuana, Cocaine       Comment: Cocaine stopped 2008, Marijuana 4/2/16                 usually once a month or two    Sexual activity: Not Currently     Partners with: Female, Male       Birth control/protection: None    Other Topics            Concern   Service        Yes    Comment:[id number]  Blood Transfusions      Yes    Comment:2012  Caffeine Concern        No  Occupational Exposure   No  Hobby Hazards           No  Sleep Concern           No  Stress Concern          Yes  Weight Concern          No  Special Diet            No  Back Care               No  Exercise                No  Bike Helmet             No  Seat Belt               No  Self-Exams              No        ALLERGIES:    -- Ace Inhibitors -- Swelling     CURRENT MEDICATIONS:   Current Outpatient Prescriptions:  amLODIPine (NORVASC) 10 mg tablet, Take 1 Tab by mouth daily. atorvastatin (LIPITOR) 20 mg tablet, Take 1 Tab by mouth nightly. diclofenac EC (VOLTAREN) 75 mg EC tablet, Take 1 Tab by mouth two (2) times daily as needed. ferrous sulfate 325 mg (65 mg iron) tablet, TAKE ONE TABLET BY MOUTH ONCE DAILY BEFORE BREAKFAST  GARLIC PO, Take  by mouth.  cloNIDine HCl (CATAPRES) 0.1 mg tablet, Take 1 Tab by mouth two (2) times a day.   carvedilol (COREG) 25 mg tablet, Take 1 Tab by mouth two (2) times daily (with meals). gabapentin (NEURONTIN) 100 mg capsule, 1 twice a day for 2 days then 1 three times a day for three days then 2 three times a day for 7 days then 3 three times a day  desloratadine (CLARINEX) 5 mg tablet, Take 1 Tab by mouth daily as needed for Allergies. sertraline (ZOLOFT) 50 mg tablet, Take 75 mg by mouth nightly. ferrous sulfate 325 mg (65 mg iron) tablet, Take  by mouth daily. polyethylene glycol (MIRALAX) 17 gram/dose powder, Take 17 g by mouth daily. One to two caps daily  buPROPion XL (WELLBUTRIN XL) 150 mg tablet, Take 150 mg by mouth every morning. Butalbital-Acetaminophen-Caff (FIORICET) -40 mg cap, Take 1 tablet every 8 hours as needed for headache  albuterol (PROVENTIL, VENTOLIN) 90 mcg/actuation inhaler, Take 2 Puffs by inhalation every four (4) hours as needed for Wheezing.  ergocalciferol (ERGOCALCIFEROL) 50,000 unit capsule, Take 1 Cap by mouth every seven (7) days. No current facility-administered medications for this visit. REVIEW of SYSTEMS:History obtained from chart review and the patient  Respiratory ROS: no cough, shortness of breath, or wheezing  Cardiovascular ROS: Please see history of present illness     PHYSICAL EXAMINATION:   BP (!) 158/100  Pulse 96  Ht 5' 3\" (1.6 m)  Wt 111.1 kg (245 lb)  SpO2 96%  BMI 43.4 kg/m2  BP Readings from Last 3 Encounters:  03/22/17 : (!) 158/100  03/15/17 : (!) 167/97  02/07/17 : 136/74    Pulse Readings from Last 3 Encounters:  03/22/17 : 96  03/15/17 : 91  02/07/17 : 80    Wt Readings from Last 3 Encounters:  03/22/17 : 111.1 kg (245 lb)  03/15/17 : 111 kg (244 lb 12.8 oz)  02/07/17 : 108 kg (238 lb)    Obese. Neck: No jugular venous distention. Chest: Clear to auscultation. Heart: Regular rhythm. No gallop.   Extremities: No edema      IMPRESSION:   Hypertension, improved  Shortness of breath, improved  Anterior cervical discomfort resolved  Cardiolite scan negative for ischemia    PLAN:  Antihypertensives refilled  Follow-up with PCP  Return here in 1-2 months    The diagnoses and plan were discussed with patient. All questions answered. Plan of care agreed to by all concerned. Concha Harp.  MD Raisa       ,

## 2017-03-31 ENCOUNTER — OFFICE VISIT (OUTPATIENT)
Dept: NEUROLOGY | Age: 42
End: 2017-03-31

## 2017-03-31 VITALS
RESPIRATION RATE: 14 BRPM | HEIGHT: 63 IN | OXYGEN SATURATION: 99 % | SYSTOLIC BLOOD PRESSURE: 140 MMHG | HEART RATE: 99 BPM | WEIGHT: 241 LBS | DIASTOLIC BLOOD PRESSURE: 88 MMHG | BODY MASS INDEX: 42.7 KG/M2 | TEMPERATURE: 99.3 F

## 2017-03-31 DIAGNOSIS — M79.601 BILATERAL ARM PAIN: ICD-10-CM

## 2017-03-31 DIAGNOSIS — G56.03 BILATERAL CARPAL TUNNEL SYNDROME: Primary | ICD-10-CM

## 2017-03-31 DIAGNOSIS — M79.602 BILATERAL ARM PAIN: ICD-10-CM

## 2017-03-31 RX ORDER — GABAPENTIN 400 MG/1
CAPSULE ORAL
Qty: 90 CAP | Refills: 3 | Status: SHIPPED | OUTPATIENT
Start: 2017-03-31 | End: 2017-11-21 | Stop reason: SDUPTHER

## 2017-03-31 NOTE — PROGRESS NOTES
[facility]             [address] [facility], [address]    3/31/2017    HPI:  Elizabeth Arroyo is a 35 y.o., left handed,   female, who presents with several complaints. Patient describes gradual onset approximately 3 years ago pain starting in the hands going to the elbows along with numbness and tingling and decreased . She notes that she does not have diabetes and is worried as, to what's causing the symptoms. She does describe some neck pain ascribed as an aching pain occasionally sharp, radiating to the occipital region. She also reports that arm. Pain is worse with prolonged gripping and occasionally wakes her from sleep. She has noted some relief with prednisone and wrist length. She was placed on Neurontin and then switched to Lyrica with fair relief of the pain pain ranges from 4-10 out of 10    Patient returns after a year absence. She finally underwent nerve conduction studies that showed severe bilateral carpal tunnel syndrome. She now reports the Neurontin helped more than the Lyrica and she is currently having hand pain and at about 7/10 it is an aching sharp pains constant despite use of medication and wrist splints  She has seen Dr Miller Patterson but cts treatment not addressed reviewed need for use of wrist splints,neurontin and surgical treatment of cts  Current Outpatient Prescriptions   Medication Sig Dispense Refill    gabapentin (NEURONTIN) 400 mg capsule 1 three times a day 90 Cap 3    amLODIPine (NORVASC) 10 mg tablet Take 1 Tab by mouth daily. 30 Tab 11    atorvastatin (LIPITOR) 20 mg tablet Take 1 Tab by mouth nightly. 30 Tab 11    diclofenac EC (VOLTAREN) 75 mg EC tablet Take 1 Tab by mouth two (2) times daily as needed. 60 Tab 5    ferrous sulfate 325 mg (65 mg iron) tablet TAKE ONE TABLET BY MOUTH ONCE DAILY BEFORE BREAKFAST 90 Tab 1    GARLIC PO Take  by mouth.       cloNIDine HCl (CATAPRES) 0.1 mg tablet Take 1 Tab by mouth two (2) times a day. 60 Tab 11    carvedilol (COREG) 25 mg tablet Take 1 Tab by mouth two (2) times daily (with meals). 60 Tab 11    desloratadine (CLARINEX) 5 mg tablet Take 1 Tab by mouth daily as needed for Allergies. 90 Tab 3    ergocalciferol (ERGOCALCIFEROL) 50,000 unit capsule Take 1 Cap by mouth every seven (7) days. 12 Cap 0    sertraline (ZOLOFT) 50 mg tablet Take 75 mg by mouth nightly.  ferrous sulfate 325 mg (65 mg iron) tablet Take  by mouth daily.  polyethylene glycol (MIRALAX) 17 gram/dose powder Take 17 g by mouth daily. One to two caps daily      buPROPion XL (WELLBUTRIN XL) 150 mg tablet Take 150 mg by mouth every morning.  Butalbital-Acetaminophen-Caff (FIORICET) -40 mg cap Take 1 tablet every 8 hours as needed for headache 12 Cap 0    albuterol (PROVENTIL, VENTOLIN) 90 mcg/actuation inhaler Take 2 Puffs by inhalation every four (4) hours as needed for Wheezing. 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 repeat in 10 years.    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 pylori test 6/9/14    placed on antibiotics and H2 blocker 6/11/14    PPD positive, treated     Tuberculosis     denies, 4/14/16    Vitamin D deficiency 6/9/14       Past Surgical History:   Procedure Laterality Date    ENDOMETRIAL CRYOABLATION      HX COLONOSCOPY  04/25/2016    DR. ESTRADA REPEAT IN 2026 (10 YEARS)    HX HERNIA REPAIR  3711    umbilical     Family History   Problem Relation Age of Onset    Hypertension Mother     Diabetes Mother     Depression Mother     Substance Abuse Mother      Tobacco use    Migraines Mother     High Cholesterol Mother     Heart Attack Mother     Heart Failure Mother     Eczema Mother     Arthritis-osteo Mother     Hypertension Father     Diabetes Father     Substance Abuse Father      Tobacco use and drug abuse    Stroke Paternal Aunt     Heart Disease Maternal Grandfather     Hypertension Maternal Grandfather     High Cholesterol Maternal Grandfather     Diabetes Maternal Grandfather     Stroke Maternal Grandfather     Heart Attack Maternal Grandfather     Heart Failure Maternal Grandfather     Alcohol abuse Brother      Drug/Alcohol abuse    Substance Abuse Maternal Grandmother      Tobacco use - MGM, MGF, and brother,    Diabetes Maternal Grandmother     Hypertension Brother     High Cholesterol Brother     Diabetes Sister     Diabetes Paternal Grandmother     Heart Attack Brother     Heart Failure Brother      Allergies   Allergen Reactions    Ace Inhibitors Swelling       Review of Systems:   Review of Systems - History obtained from the patient  General ROS: positive for  - chills, fatigue and sleep disturbance  Psychological ROS: positive for - anxiety and depression  ENT ROS: negative  Hematological and Lymphatic ROS: negative  Endocrine ROS: negative  Respiratory ROS: no cough, shortness of breath, or wheezing  Cardiovascular ROS: no chest pain or dyspnea on exertion  Gastrointestinal ROS: positive for - abdominal pain  Genito-Urinary ROS: no dysuria, trouble voiding, or hematuria  Musculoskeletal ROS: positive for - joint pain and pain in neck - generalized  Neurological ROS: positive for - headaches and numbness/tingling  Dermatological ROS: negative    PHYSICAL EXAMINATION:    Visit Vitals    /88 (BP 1 Location: Left arm, BP Patient Position: Sitting)    Pulse 99    Temp 99.3 °F (37.4 °C) (Oral)    Resp 14    Ht 5' 3\" (1.6 m)    Wt 109.3 kg (241 lb)    SpO2 99%    BMI 42.69 kg/m2     General:  Well defined, nourished, and groomed individual in no acute distress. Neck: Supple, nontender, thyroid within normal limits, no JVD, no bruits, no pain with resistance to active range of motion. Heart: Regular rate and rhythm, no murmurs, rub, or gallop. Normal S1S2. Lungs:  Clear to auscultation bilaterally with equal chest expansion, no cough, no wheeze  Musculoskeletal:  Extremities revealed no edema and had full range of motion of joints. Psych:  Good mood and normal affect    NEUROLOGICAL EXAMINATION:     Mental Status:   Alert and oriented to person, place, and time with recent and remote memory intact. Attention span and concentration are normal. Speech is pressured with a full fund of knowledge. Cranial Nerves:    II, III, IV, VI:  Visual acuity grossly intact. Visual fields are normal.    Pupils are equal, round, and reactive to light and accommodation. Extra-ocular movements are full and fluid. no ptosis or nystagmus. V-XII: Hearing is grossly intact. Facial features are symmetric, with normal sensation and strength. The palate rises symmetrically and the tongue protrudes midline. Sternocleidomastoids 5/5. Motor Examination: Normal tone, bulk, and strength, 5/5 muscle strength throughout except decreased  right>left. No cogwheel rigidity or clonus present. Coordination:     No resting or intention tremor    Gait and Station:  Steady while walking  No pronator drift. No muscle wasting or fasiculations noted.     Neurophysiology Report        Patient: Pamela Castillo       ID: [id number] Physician: Angela Akins MD   Gender: Female Ref Phys: Klever Davison MD   Handedness:         Study Date: October 13, 2016             Patient History: Patient complains of bilateral hand pain and suspected carpal tunnel syndrome. Nerve Conduction Studies  Anti Sensory Left/Right Comparison     Stim Site NR L Lat (ms) R Lat (ms) Norm Peak (ms) L Amp (µV) R Amp (µV) Norm O-P Amp Site1 Site2 L Stu (m/s) R Stu (m/s) Norm Stu (m/s)   Median 2nd Digit Anti Sensory (2nd Digit)   Wrist    7.8 1.7 <3.5 5.0 3.7 >20 Wrist 2nd Digit 16.7 76.5 >50   Site 2    1.7 2.2   5.2 3.5               Site 3    2.9 1.7   2.1 6.2               Site 4    3.5 1.7   2.1 9.6               Ulnar Anti Sensory (5th Digit )   Wrist    1.9 1.9 <3.1 29.6 22.3 >17.0 Wrist 5th Digit  57.9 57.9 >50   Site 2    1.9 1.9   24.3 32.1               Site 3    2.1 1.9   19.6 35.1               Site 4    2.1 1.9   18.5 33.5               Site 5    2.2     12.2                    Motor Left/Right Comparison     Stim Site L Lat (ms) R Lat (ms) Norm Onset (ms) L Amp (mV) R Amp (mV) Norm O-P Amp Site1 Site2 L Stu (m/s) R Stu (m/s) Norm Stu (m/s)   Median Motor (Abd Poll Brev)   Wrist 9.8 10.2 <4.4 6.7 6.1 >4.0 Elbow Wrist 60.6 52.5 >49   Elbow 13.1 14.2   6.4 5.6               Ulnar Motor (Abd Dig Minimi )   Wrist 2.6 2.4 <3.3 4.1 3.5 >6.0 B Elbow Wrist 60.6 51.2 >49   B Elbow 5.9 6.5   3.9 2.8   A Elbow B Elbow 65.4 78.3 >50   A Elbow 8.5 8.8   3.6 3.4                        NCS/EMG FINDINGS:  · Evaluation of the Left median motor and the Right median motor nerves showed prolonged distal onset latency (L9.8, R10.2 ms). · The Left ulnar motor, the Right ulnar motor, and the Right Median 2nd Digit sensory nerves showed reduced amplitude (L4.1, R3.5, R3.7 µV). · The Left Median 2nd Digit sensory nerve showed prolonged distal peak latency (8.8 ms), reduced amplitude (5.0 µV), and decreased conduction velocity (Wrist-2nd Digit, 16.7 m/s). · The Left ulnar sensory and Right ulnar sensory nerves were unremarkable.         INTERPRETATION: Findings above are compatible with bilateral severe carpal tunnel syndrome. ___________________________  Virginia Yi MD         Assessment and Plan:   Letha Holley is a 35 y.o. left handed female whose history and physical are consistent with bilateral cts severe   Alea F Hayes-Huitron who has risk factors including elevated hemoglobin a1c. Alea was seen today for follow-up and neurologic problem. Diagnoses and all orders for this visit:    Bilateral carpal tunnel syndrome  -     REFERRAL TO ORTHOPEDIC SURGERY    Bilateral arm pain  -     REFERRAL TO ORTHOPEDIC SURGERY    Other orders  -     gabapentin (NEURONTIN) 400 mg capsule; 1 three times a day      Follow-up Disposition:  Return in about 3 months (around 6/30/2017). Reviewed notes in [address] [facility] and workup planned as well as risks and benefits of neurontin  I spent   30 minutes with the patient in face-to-face consultation, of which greater than 50% was spent in counseling and coordination of care as described above.

## 2017-03-31 NOTE — MR AVS SNAPSHOT
Visit Information Date & Time Provider Department Dept. Phone Encounter #  
 3/31/2017  1:00 PM Juancarlos Martinez MD [facility] [phone] [id number] Follow-up Instructions Return in about 3 months (around 6/30/2017). Your Appointments 4/26/2017  3:30 PM  
Follow Up with Krista Mtz MD  
[address] [facility], [address] [facility] [address] [facility]) Appt Note: 6 wk p.t. back + neck  fu no copay [address] [facility] [address]  
[phone]  
  
   
 [address] [facility] [address] 7/11/2017  4:15 PM  
ROUTINE CARE with Cesar Alfred MD  
[facility] ([address]) Appt Note: routine f/u 5mo [address] [facility]  
[address] [facility] [address] [facility] Upcoming Health Maintenance Date Due  
 PAP AKA CERVICAL CYTOLOGY 8/21/2018 DTaP/Tdap/Td series (2 - Td) 10/25/2026 Allergies as of 3/31/2017  Review Complete On: 3/31/2017 By: Juancarlos Martinez MD  
  
 Severity Noted Reaction Type Reactions Ace Inhibitors  09/26/2012    Swelling Current Immunizations  Reviewed on 10/25/2016 Name Date Influenza Vaccine (Quad) PF 10/25/2016 Influenza Vaccine PF 12/11/2013 Pneumococcal Polysaccharide (PPSV-23) 10/25/2016 Tdap 10/25/2016 Not reviewed this visit You Were Diagnosed With   
  
 Codes Comments Bilateral carpal tunnel syndrome    -  Primary ICD-10-CM: G56.03 
ICD-9-CM: 354.0 Bilateral arm pain     ICD-10-CM: S09.837, M79.602 ICD-9-CM: 729.5 Vitals BP Pulse Temp Resp Height(growth percentile) Weight(growth percentile) 140/88 (BP 1 Location: Left arm, BP Patient Position: Sitting) 99 99.3 °F (37.4 °C) (Oral) 14 5' 3\" (1.6 m) 241 lb (109.3 kg) LMP SpO2 BMI OB Status Smoking Status 03/30/2017 (Exact Date) 99% 42.69 kg/m2 Having regular periods Current Every Day Smoker Vitals History BMI and BSA Data Body Mass Index Body Surface Area  
 42.69 kg/m 2 2.2 m 2 Preferred Pharmacy Pharmacy Name Phone [facility] [address] [phone] Your Updated Medication List  
  
   
This list is accurate as of: 3/31/17  2:25 PM.  Always use your most recent med list.  
  
  
  
  
 albuterol 90 mcg/actuation inhaler Commonly known as:  Maida Dylan Take 2 Puffs by inhalation every four (4) hours as needed for Wheezing. amLODIPine 10 mg tablet Commonly known as:  Agueda Fast Take 1 Tab by mouth daily. atorvastatin 20 mg tablet Commonly known as:  LIPITOR Take 1 Tab by mouth nightly. buPROPion  mg tablet Commonly known as:  Zee Kervin Take 150 mg by mouth every morning. butalbital-acetaminophen-caff -40 mg per capsule Commonly known as:  [employer] Take 1 tablet every 8 hours as needed for headache  
  
 carvedilol 25 mg tablet Commonly known as:  Real Bracket Take 1 Tab by mouth two (2) times daily (with meals). cloNIDine HCl 0.1 mg tablet Commonly known as:  CATAPRES Take 1 Tab by mouth two (2) times a day. desloratadine 5 mg tablet Commonly known as:  CLARINEX Take 1 Tab by mouth daily as needed for Allergies. diclofenac EC 75 mg EC tablet Commonly known as:  VOLTAREN Take 1 Tab by mouth two (2) times daily as needed. ergocalciferol 50,000 unit capsule Commonly known as:  ERGOCALCIFEROL Take 1 Cap by mouth every seven (7) days. * ferrous sulfate 325 mg (65 mg iron) tablet Take  by mouth daily. * ferrous sulfate 325 mg (65 mg iron) tablet TAKE ONE TABLET BY MOUTH ONCE DAILY BEFORE BREAKFAST  
  
 gabapentin 400 mg capsule Commonly known as:  NEURONTIN  
1 three times a day GARLIC PO Take  by mouth. polyethylene glycol 17 gram/dose powder Commonly known as:  Meli Pile Take 17 g by mouth daily. One to two caps daily  
  
 sertraline 50 mg tablet Commonly known as:  ZOLOFT Take 75 mg by mouth nightly. * Notice: This list has 2 medication(s) that are the same as other medications prescribed for you. Read the directions carefully, and ask your doctor or other care provider to review them with you. Prescriptions Sent to Pharmacy Refills  
 gabapentin (NEURONTIN) 400 mg capsule 3 Si three times a day Class: Normal  
 [address] [place] [address]  #: [phone] We Performed the Following REFERRAL TO ORTHOPEDIC SURGERY [REF62 Custom] Comments:  
 Please evaluate patient for severe bilateral cts Follow-up Instructions Return in about 3 months (around 2017). To-Do List   
 2017 11:30 AM  
  Appointment with Joanna Gallardo PTA at [facility]  [facility] ([phone])  
  
 2017 12:00 PM  
  Appointment with Lizette Carcamo PT at [address] [place] [address] ([phone])  
  
 2017 11:00 AM  
  Appointment with Dung Morales PT at [address] [place] [address] ([phone])  
  
 2017 11:30 AM  
  Appointment with Dung Morales PT at [address] [place] [address] ([phone]) Referral Information Referral ID Referred By Referred To  
  
 [id number] Bronson GRANADOS Not Available Visits Status Start Date End Date 1 New Request 3/31/17 3/31/18 If your referral has a status of pending review or denied, additional information will be sent to support the outcome of this decision. Patient Instructions Wear wrist splints at night Introducing [facility]! Dear Una Postin: Thank you for requesting a [employer] account. Our records indicate that you already have an active [employer] account. You can access your account anytime at [email or web address] Did you know that you can access your hospital and ER discharge instructions at any time in [employer]? You can also review all of your test results from your hospital stay or ER visit. Additional Information If you have questions, please visit the Frequently Asked Questions section of the [employer] website at [email or web address] Remember, [employer]ia is NOT to be used for urgent needs. For medical emergencies, dial 911. Now available from your iPhone and Android! Please provide this summary of care documentation to your next provider. Your primary care clinician is listed as Harriet Mcneill. If you have any questions after today's visit, please call [phone].

## 2017-04-04 ENCOUNTER — HOSPITAL ENCOUNTER (OUTPATIENT)
Dept: PHYSICAL THERAPY | Age: 42
Discharge: HOME | End: 2017-04-04
Payer: MEDICAID

## 2017-04-04 PROCEDURE — 97110 THERAPEUTIC EXERCISES: CPT

## 2017-04-04 PROCEDURE — 97112 NEUROMUSCULAR REEDUCATION: CPT

## 2017-04-04 NOTE — PROGRESS NOTES
PT DAILY TREATMENT NOTE     Patient Name: Fiordaliza Ko  Date:2017  : 1975  [x]  Patient  Verified  Payor: [place]ICA[place] / Plan: 1500 / Product Type: Medicaid /    In time:11:42  Out time:12:13  Total Treatment Time (min): 31  Visit #: 4 of 8    Treatment Area: Cervicalgia [M54.2]  Low back pain [M54.5]  Pain in thoracic spine [M54.6]    SUBJECTIVE  Pain Level (0-10 scale): LBP 5/10, mid-back 3/10  Any medication changes, allergies to medications, adverse drug reactions, diagnosis change, or new procedure performed?: [x] No    [] Yes (see summary sheet for update)  Subjective functional status/changes:   [] No changes reported  \"Nights are painful, trouble getting in a comfortable position. \"    OBJECTIVE     Modality rationale: PD MHP.    Min Type Additional Details    [] Estim:  []Unatt       []IFC  []Premod                        []Other:  []w/ice   []w/heat  Position:  Location:    [] Estim: []Att    []TENS instruct  []NMES                    []Other:  []w/US   []w/ice   []w/heat  Position:  Location:    []  Traction: [] Cervical       []Lumbar                       [] Prone          []Supine                       []Intermittent   []Continuous Lbs:  [] before manual  [] after manual    []  Ultrasound: []Continuous   [] Pulsed                           []1MHz   []3MHz W/cm2:  Location:    []  Iontophoresis with dexamethasone         Location: [] Take home patch   [] In clinic    []  Ice     []  heat  []  Ice massage  []  Laser   []  Anodyne Position:  Location:    []  Laser with stim  []  Other:  Position:  Location:    []  Vasopneumatic Device Pressure:       [] lo [] med [] hi   Temperature: [] lo [] med [] hi   [] Skin assessment post-treatment:  []intact []redness- no adverse reaction    []redness  adverse reaction:     8 min Therapeutic Exercise:  [x] See flow sheet :   Rationale: increase ROM and increase strength to improve the patients ability to perform ADL's.     23 min Neuromuscular Re-education:  [x]  See flow sheet :   Rationale: increase strength and increase proprioception  to improve the patients ability to perform functional activities. With   [x] TE   [] TA   [] neuro   [] other: Patient Education: [x] Review HEP    [] Progressed/Changed HEP based on:   [] positioning   [] body mechanics   [] transfers   [] heat/ice application    [] other:      Other Objective/Functional Measures: AROM C/S Rotation WNL's, no pain noted. Pain Level (0-10 scale) post treatment: 3-4/10    ASSESSMENT/Changes in Function: Pt reports improvement with ADL's, requires occasional cueing for TA contraction. Patient will continue to benefit from skilled PT services to modify and progress therapeutic interventions, address functional mobility deficits, address ROM deficits, address strength deficits, analyze and cue movement patterns and analyze and modify body mechanics/ergonomics to attain remaining goals. [x]  See Plan of Care  []  See progress note/recertification  []  See Discharge Summary         Progress towards goals / Updated goals:  Short Term Goals: To be accomplished in 1 weeks:  1. Pt will be I and compliant with HEP. Met per pt report 03/23/2017  Long Term Goals: To be accomplished in 4 weeks:  1. improve c/s FOTO to 60 for improved ability for functional tasks  2. Improved L/S FOTO to 49 for improved ability for daily activities  3. Pt will demonstrate at least 55 degrees of R c/s rotation for ease with functional activity.  - AROM C/S Rotation WNL's, no pain noted. 4/4/2017  4. Pt will report being able to put her shoes on without difficulty.     PLAN  []  Upgrade activities as tolerated     [x]  Continue plan of care  []  Update interventions per flow sheet       []  Discharge due to:_  []  Other:_      Carolyn Mai, PTA 4/4/2017  11:50 AM    Future Appointments  Date Time Provider Ras Riggs   4/6/2017 12:00 PM Fina Chaves, PT [facility]   4/12/2017 11:00 AM Padimni Ricardo, PT [facility]   4/14/2017 11:30 AM Padmini Ricardo, PT [facility]   4/26/2017 3:30 PM Juan Okeefe  [address]   6/12/2017 2:45 PM Robbie Shukla  [address]   7/11/2017 4:15 PM Giovana Coelho MD [address]

## 2017-04-05 ENCOUNTER — DOCUMENTATION ONLY (OUTPATIENT)
Dept: NEUROLOGY | Age: 42
End: 2017-04-05

## 2017-04-06 ENCOUNTER — HOSPITAL ENCOUNTER (OUTPATIENT)
Dept: PHYSICAL THERAPY | Age: 42
End: 2017-04-06
Payer: MEDICAID

## 2017-04-06 ENCOUNTER — TELEPHONE (OUTPATIENT)
Dept: ORTHOPEDIC SURGERY | Age: 42
End: 2017-04-06

## 2017-04-06 NOTE — TELEPHONE ENCOUNTER
If the therapists can show her safe lifting techniques, I would be okay with her trying to take care of this patient. I do not suggest solo lifting of over 30 lbs manually as a general healthcare worker rule. She would need to use a livier lift if she is expected to help mobilize her patient.

## 2017-04-06 NOTE — TELEPHONE ENCOUNTER
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 Patient states she was offered the position today to begin tonight. Pt needs to know if that's something she can handle given the diagnosis with her cervical spine. Pt commented her physical therapists stated they would not suggest it, however if she had to take the job case, they could show her methods for movement/transfer/ lifting/moving of patient she would be caring for. Per pt if approve by dr Moses Co phys therapy would need approval.. Tulio Gautam Please advise asap.  Please call pt at [phone]

## 2017-04-07 NOTE — TELEPHONE ENCOUNTER
I called the patient back and was going to leave a message but both numbers for the patient the mailbox is full so I caould not leave a message.

## 2017-04-10 ENCOUNTER — DOCUMENTATION ONLY (OUTPATIENT)
Dept: ORTHOPEDIC SURGERY | Age: 42
End: 2017-04-10

## 2017-04-10 RX ORDER — DESLORATADINE 5 MG/1
5 TABLET ORAL
Qty: 90 TAB | Refills: 3 | Status: SHIPPED | OUTPATIENT
Start: 2017-04-10 | End: 2017-07-11

## 2017-04-10 NOTE — TELEPHONE ENCOUNTER
This patient contacted office for the following prescriptions to be filled:    Medication requested :   Requested Prescriptions     Pending Prescriptions Disp Refills    desloratadine (CLARINEX) 5 mg tablet 90 Tab 3     Sig: Take 1 Tab by mouth daily as needed for Allergies.      PCP: [address] [place] [address] Street or Print: [employer]  Mail order or Local [id number]     Scheduled appointment if not seen by current providers in office:  LOV 11/8/2017 f/u 7/11/2017

## 2017-04-10 NOTE — PROGRESS NOTES
Received referral for carpel tunnel faxed to [facility] informed pt that someone from there will give her a call to [facility] . I sent copy of fax to scan

## 2017-04-11 NOTE — TELEPHONE ENCOUNTER
I called the patient today and spoke with her regarding the message about her job she is applying for and explained to her what Dr. Jenny Manzanares had stated anad she wanted to know if I could send it to her MY Chart and I did because she was driving at the time I called her.

## 2017-04-12 ENCOUNTER — APPOINTMENT (OUTPATIENT)
Dept: PHYSICAL THERAPY | Age: 42
End: 2017-04-12
Payer: MEDICAID

## 2017-04-17 ENCOUNTER — HOSPITAL ENCOUNTER (OUTPATIENT)
Dept: PHYSICAL THERAPY | Age: 42
Discharge: HOME | End: 2017-04-17
Payer: MEDICAID

## 2017-04-17 PROCEDURE — 97110 THERAPEUTIC EXERCISES: CPT

## 2017-04-17 PROCEDURE — 97530 THERAPEUTIC ACTIVITIES: CPT

## 2017-04-17 NOTE — PROGRESS NOTES
PT DAILY TREATMENT NOTE 3-16    Patient Name: Antonio Andrade  Date:2017  : 1975  [x]  Patient  Verified  Payor: [place]ICA[place] / Plan: 1500  / Product Type: Medicaid /    In time:1607  Out time:1641  Total Treatment Time (min): 34  Visit #: 5 of 8    Treatment Area: Cervicalgia [M54.2]  Low back pain [M54.5]  Pain in thoracic spine [M54.6]    SUBJECTIVE  Pain Level (0-10 scale): 0  Any medication changes, allergies to medications, adverse drug reactions, diagnosis change, or new procedure performed?: [x] No    [] Yes (see summary sheet for update)  Subjective functional status/changes:   [] No changes reported  Pt reports feeling much better, and would like to discharge today. OBJECTIVE     min []Eval                  []Re-Eval     8 min Therapeutic Exercise:  [x] See flow sheet :   Rationale: increase ROM and increase strength to improve the patients ability to perform ADLs    26 min Therapeutic Activity:  [x]  See flow sheet :   Rationale: increase strength, improve coordination and increase proprioception  to improve the patients ability to improve return to work with proper lifting mechanics           With   [] TE   [] TA   [] neuro   [] other: Patient Education: [x] Review HEP    [] Progressed/Changed HEP based on:   [] positioning   [] body mechanics   [] transfers   [] heat/ice application    [] other:      Other Objective/Functional Measures:   FOTO 57 (lumbar), 63 (neck)  R cervical rotation = 60 degrees  Able to put on shoes without difficulty     Pain Level (0-10 scale) post treatment: 0    ASSESSMENT/Changes in Function:   Pt reports she feels ready for d/c today. 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   Improved R cervical rotation to 60 degrees without pain, she is able to put on shoes without difficulty, and FOTO improved for both neck and back. Will d/c at this time. []  See Plan of Care  []  See progress note/recertification  [x]  See Discharge Summary         Progress towards goals / Updated goals:  Short Term Goals: To be accomplished in 1 weeks:  1. Pt will be I and compliant with HEP. Met per pt report 03/23/2017  Long Term Goals: To be accomplished in 4 weeks:  1. improve c/s FOTO to 60 for improved ability for functional tasks. Met 63/100 04/17/2017  2. Improved L/S FOTO to 49 for improved ability for daily activities Met 57/100 04/17/2017  3. Pt will demonstrate at least 55 degrees of R c/s rotation for ease with functional activity. - AROM C/S Rotation WNL's, no pain noted. 4/4/2017; met, 60 degrees R c/s rotation 04/17/2017  4. Pt will report being able to put her shoes on without difficulty.  Met 0/17/2017    PLAN  []  Upgrade activities as tolerated     []  Continue plan of care  []  Update interventions per flow sheet       [x]  Discharge due to completion of program  []  Other:_      Douglas Kaur, PT 4/17/2017  5:00 PM    Future Appointments  Date Time Provider Ras Keely   4/26/2017 3:30 PM Nano Mai  [address]   6/12/2017 2:45 PM Kassi Weaver  [address]   7/11/2017 4:15 PM Lyndsey Mars MD [facility] [address]

## 2017-04-17 NOTE — PROGRESS NOTES
[facility]  [address]  [facility], [address]  [phone] fax    Physical Therapy Discharge Summary  Patient name: Elizabeth Arroyo Start of Care: 3/21/2017   Referral source: Av Solis MD : 1975    Medical Diagnosis: Cervicalgia [M54.2]  Low back pain [M54.5]  Pain in thoracic spine [M54.6] Onset Date:chronic    Treatment Diagnosis: chronic neck and back pain   Prior Hospitalization: see medical history Provider#: [id number]   Medications: Verified on Patient summary List   Comorbidities: depression, HTN, +tobacco use, asthma, scoliosis, alcohol use  Prior Level of Function: functionally independent with activities  Visits from Start of Care: 5    Missed Visits: 0  Reporting Period : 2017 to 2017    Summary of Care:  Short Term Goals: To be accomplished in 1 weeks:  1. Pt will be I and compliant with HEP. Met per pt report 2017  Long Term Goals: To be accomplished in 4 weeks:  1. improve c/s FOTO to 60 for improved ability for functional tasks. Met 63/100 2017  2. Improved L/S FOTO to 49 for improved ability for daily activities Met 57/100 2017  3. Pt will demonstrate at least 55 degrees of R c/s rotation for ease with functional activity. - AROM C/S Rotation WNL's, no pain noted. 2017; met, 60 degrees R c/s rotation 2017  4. Pt will report being able to put her shoes on without difficulty. Met       ASSESSMENT/RECOMMENDATIONS: Pt reports she feels ready for d/c today. 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 Improved R cervical rotation to 60 degrees without pain, she is able to put on shoes without difficulty, and FOTO improved for both neck and back. Will d/c at this time.   [x]Discontinue therapy: [x]Patient has reached or is progressing toward set goals      []Patient is non-compliant or has abdicated      []Due to lack of appreciable progress towards set goals    Jannet Voss, PT 4/17/2017 5:40 PM

## 2017-04-26 ENCOUNTER — TELEPHONE (OUTPATIENT)
Dept: ORTHOPEDIC SURGERY | Age: 42
End: 2017-04-26

## 2017-04-26 ENCOUNTER — OFFICE VISIT (OUTPATIENT)
Dept: ORTHOPEDIC SURGERY | Age: 42
End: 2017-04-26

## 2017-04-26 VITALS
DIASTOLIC BLOOD PRESSURE: 96 MMHG | TEMPERATURE: 98.5 F | WEIGHT: 243 LBS | BODY MASS INDEX: 43.05 KG/M2 | SYSTOLIC BLOOD PRESSURE: 143 MMHG | HEIGHT: 63 IN | RESPIRATION RATE: 18 BRPM | HEART RATE: 74 BPM

## 2017-04-26 DIAGNOSIS — G56.03 BILATERAL CARPAL TUNNEL SYNDROME: Primary | ICD-10-CM

## 2017-04-26 DIAGNOSIS — M79.18 MYOFASCIAL PAIN: ICD-10-CM

## 2017-04-26 DIAGNOSIS — M47.899 FACET SYNDROME: ICD-10-CM

## 2017-04-26 NOTE — PATIENT INSTRUCTIONS
What Is Myofascial Pain Syndrome? Myofascial pain syndrome is a chronic pain disorder that affects fascia (connective tissue that covers the muscles) and is characterized by muscle pain, tenderness, and spasm. The syndrome can involve a single muscle or a muscle group. Myofascial pain syndrome typically affects muscles in asymmetric areas of the body. The precise cause of the syndrome is unknown. As with most chronic pain conditions, associated symptoms may include poor sleep, fatigue, depression, and behavioral disturbances. In myofascial pain syndrome, there are focal tender points in muscles called trigger points. A trigger point is usually within a taut band of muscle that can be felt by the examiner. They can ultimately be identified by the examiner applying pressure with one to three fingers and the thumb. Patients usually exhibit a jump sign when a trigger point is pressed. The patient with a positive jump sign may wince, cry out, and withdraw from the pressure being applied by an examiner. The pain may be referred, or felt at a distance away from the area being compressed. Trigger points can occur in muscles, ligaments, fascia, and periosteum (the membrane surrounding a bone). Pain in trigger points can be made worse with activity or stress.  Currently, four types of trigger points can be distinguished:    -An active trigger point is an area of extreme tenderness usually within a taut muscle or muscle group  -A latent trigger point is an inactive area with the potential to act like a trigger point  -A secondary trigger point is a highly irritable area in a muscle or muscle group that can be activated due to a trigger point or overload in another muscle or muscle group  -A satellite trigger point is a highly irritable spot in a muscle or muscle group that becomes active because the muscle is in the region of another trigger point  Causes Of Myofascial Pain Syndrome    No one single factor can be identified as causing myofascial pain syndrome. The syndrome may develop from a muscle injury or excessive strain on a certain muscle or muscle group, ligament, or tendon. In addition, the following factors may be contributors:    Trauma to the discs between the backbones, or vertebrae  Inflammatory conditions  Lack of blood flow to heart muscle  Deconditioning from lack of exercise  General fatigue  Nutritional deficiencies  Hormonal changes  Obesity  Intense cooling of parts of the body  Use of tobacco  Repetitive motions  Overuse of a muscle  Alcohol or drug abuse  Long-term emotional stress  Treatments For Myofascial Pain Syndrome    Myofascial pain syndrome is best treated with a team of various medical professionals and various forms of therapies. The treatment team may consist of a primary care physician, a specialist in [address] [facility], nurses, physical and occupational therapists, massage therapists, and psychologists. The therapies may be carried out through drug and non-drug means. Current practice is combining non-drug therapies with short-term drug therapies for longer lasting and maximal benefits. Various drugs can be used in the treatment of myofascial pain syndrome. Non-steroidal anti-inflammatory drugs (NSAIDs) such as aspirin (Justina), ibuprofen (Advil), and naproxen sodium (Aleve) can be used short term to reduce acute pain and inflammation. Acetaminophen (Tylenol) and oral narcotics such as oxycodone and hydrocodone may also be used in the short term for pain relief. Tricyclic antidepressants such as trazodone (Desyrel) and amitriptyline (Elavil) can be used at bedtime to improve sleep as well as relieve pain. Muscle relaxants such as cyclobenzaprine (Flexeril) and carisoprodol (Soma) can be used to relax muscle spasm and improve sleep, as they can have a sedating effect.     Antidepressants such as fluoxetine (Prozac), sertraline (Zoloft), and duloxetine (Cymbalta) can be helpful with the chronic pain of myofascial pain syndrome. Anti-seizure medications such as gabapentin (Neurontin) and pregabalin (Lyrica) can also be helpful with the chronic pain of the syndrome. Capsaicin cream, a topical pain reliever derived from chili peppers, may also be helpful with the chronic pain of myofascial pain syndrome. Injections can also be utilized in the treatment of myofascial pain syndrome. Trigger point injections and botulinum toxin (Botox) injections are two areas of recent interest. Trigger point injections involve direct injection of a local anesthetic into the trigger point. This method is very effective when there are only a few precisely located trigger points. Botox can also be directly injected into trigger points. This method has produced inconsistent results. Various non-drug therapies can be utilized in the treatment of myofascial pain syndrome. Physical therapy is one of the best treatments for the syndrome. The stretch and spray method has also been used with some success for treatment of the syndrome. It involves spraying the muscle or muscle group containing the trigger point with a coolant, such as flourimethane, and then slowly stretching the muscle. Massage therapy is another non-drug treatment used in the treatment of the syndrome. Massage therapists exert ischemic compression, which is the application of progressively stronger pressure on a trigger point for the purpose of eliminating its tenderness. Conclusion    Myofascial pain syndrome is a chronic pain disorder that affects muscle and the fascia covering the muscle. Key to the diagnosis and treatment of the syndrome is the identification of trigger points, which are areas of extreme tenderness in bands of taut muscle. No one knows the exact cause of the disorder. The treatment of myofascial pain syndrome is best done by a multidisciplinary team utilizing various drug and non-drug therapies.  The combination of physical therapy, trigger point injections, and massage are routinely used with some success in the treatment of myofascial pain syndrome. https://[email or web address]/conditions/myofascial-pain-syndrome/      Carpal Tunnel Syndrome: Exercises  Your Care Instructions  Here are some examples of typical rehabilitation exercises for your condition. Start each exercise slowly. Ease off the exercise if you start to have pain. Your doctor or your physical or occupational therapist will tell you when you can start these exercises and which ones will work best for you. How to do the exercises  Note: When you no longer have pain or numbness, you can do exercises to help prevent carpal tunnel syndrome from coming back. Do not do any stretch or movement that is uncomfortable or painful. Warm-up stretches  1. Rotate your wrist up, down, and from side to side. Repeat 4 times. 2. Stretch your fingers far apart. Relax them, and then stretch them again. Repeat 4 times. 3. Stretch your thumb by pulling it back gently, holding it, and then releasing it. Repeat 4 times. Prayer stretch    1. Start with your palms together in front of your chest just below your chin. 2. Slowly lower your hands toward your waistline, keeping your hands close to your stomach and your palms together until you feel a mild to moderate stretch under your forearms. 3. Hold for at least 15 to 30 seconds. Repeat 2 to 4 times. Wrist flexor stretch    1. Extend your arm in front of you with your palm up. 2. Bend your wrist, pointing your hand toward the floor. 3. With your other hand, gently bend your wrist farther until you feel a mild to moderate stretch in your forearm. 4. Hold for at least 15 to 30 seconds. Repeat 2 to 4 times. Wrist extensor stretch    Repeat steps 1 through 4 of the stretch above, but begin with your extended hand palm down. Follow-up care is a key part of your treatment and safety.  Be sure to make and go to all appointments, and call your doctor if you are having problems. It's also a good idea to know your test results and keep a list of the medicines you take. Where can you learn more? Go to http://juan ramon-chuy.info/. Enter O537 in the search box to learn more about \"Carpal Tunnel Syndrome: Exercises. \"  Current as of: May 23, 2016  Content Version: 11.2  © 2569-6409 [employer], [employer]. Care instructions adapted under license by [employer] (which disclaims liability or warranty for this information). If you have questions about a medical condition or this instruction, always ask your healthcare professional. [address] any warranty or liability for your use of this information.

## 2017-04-26 NOTE — MR AVS SNAPSHOT
Visit Information Date & Time Provider Department Dept. Phone Encounter #  
 4/26/2017  3:30 PM Elton Tavera MD [address] [facility] [phone] [id number] Follow-up Instructions Return if symptoms worsen or fail to improve. Routing History Follow-up and Disposition History Your Appointments 5/19/2017  3:30 PM  
New Patient with Rock Charlie MD  
[address] [facility], [address] [facility] [address]) Appt Note: DELILAH CARPAL TUNNE SYNDROME REF DR Tavon Jacobs INS INS SYTEM  
 [address] [facility], [address] [facility] [address]  
[phone]  
  
   
 [address] [address] 6/12/2017  2:45 PM  
Follow Up with Susan Leahy MD  
[address] [place] [address]) Appt Note: 3 month f/u  
 [address] [address] [phone]  
  
   
 [facility] [address]  
  
    
 7/11/2017  4:15 PM  
ROUTINE CARE with Jing Mai MD  
[facility] ([address]) Appt Note: routine f/u 5mo 8 [facility] [address] [facility] [address]  
[address] [facility] [address] [facility] [address] Upcoming Health Maintenance Date Due  
 PAP AKA CERVICAL CYTOLOGY 8/21/2018 DTaP/Tdap/Td series (2 - Td) 10/25/2026 Allergies as of 4/26/2017  Review Complete On: 4/26/2017 By: Elton Tavera MD  
  
 Severity Noted Reaction Type Reactions Ace Inhibitors  09/26/2012    Swelling Current Immunizations  Reviewed on 10/25/2016 Name Date Influenza Vaccine (Quad) PF 10/25/2016 Influenza Vaccine PF 12/11/2013 Pneumococcal Polysaccharide (PPSV-23) 10/25/2016 Tdap 10/25/2016 Not reviewed this visit You Were Diagnosed With   
  
 Codes Comments Bilateral carpal tunnel syndrome    -  Primary ICD-10-CM: G56.03 
ICD-9-CM: 354.0 Myofascial pain     ICD-10-CM: M79.1 ICD-9-CM: 729.1 Facet syndrome     ICD-10-CM: M12.88 ICD-9-CM: 724.8 Vitals BP Pulse Temp Resp Height(growth percentile) Weight(growth percentile) (!) 143/96 74 98.5 °F (36.9 °C) (Oral) 18 5' 3\" (1.6 m) 243 lb (110.2 kg) LMP BMI OB Status Smoking Status 03/30/2017 (Exact Date) 43.05 kg/m2 Having regular periods Current Every Day Smoker BMI and BSA Data Body Mass Index Body Surface Area 43.05 kg/m 2 2.21 m 2 Preferred Pharmacy Pharmacy Name Phone [facility] [address] [phone] Your Updated Medication List  
  
   
This list is accurate as of: 4/26/17  4:21 PM.  Always use your most recent med list.  
  
  
  
  
 albuterol 90 mcg/actuation inhaler Commonly known as:  Jefe Headtacho Take 2 Puffs by inhalation every four (4) hours as needed for Wheezing. amLODIPine 10 mg tablet Commonly known as:  Mikaela Coop Take 1 Tab by mouth daily. atorvastatin 20 mg tablet Commonly known as:  LIPITOR Take 1 Tab by mouth nightly. buPROPion  mg tablet Commonly known as:  Jennifer Stammer Take 150 mg by mouth every morning. butalbital-acetaminophen-caff -40 mg per capsule Commonly known as:  [employer] Take 1 tablet every 8 hours as needed for headache  
  
 carvedilol 25 mg tablet Commonly known as:  Esther Devonshire Take 1 Tab by mouth two (2) times daily (with meals). cloNIDine HCl 0.1 mg tablet Commonly known as:  CATAPRES Take 1 Tab by mouth two (2) times a day. desloratadine 5 mg tablet Commonly known as:  CLARINEX Take 1 Tab by mouth daily as needed for Allergies. diclofenac EC 75 mg EC tablet Commonly known as:  VOLTAREN Take 1 Tab by mouth two (2) times daily as needed. ergocalciferol 50,000 unit capsule Commonly known as:  ERGOCALCIFEROL Take 1 Cap by mouth every seven (7) days. * ferrous sulfate 325 mg (65 mg iron) tablet Take  by mouth daily. * ferrous sulfate 325 mg (65 mg iron) tablet TAKE ONE TABLET BY MOUTH ONCE DAILY BEFORE BREAKFAST  
  
 gabapentin 400 mg capsule Commonly known as:  NEURONTIN  
1 three times a day GARLIC PO Take  by mouth.  
  
 polyethylene glycol 17 gram/dose powder Commonly known as:  Rocky Gibes Take 17 g by mouth daily. One to two caps daily  
  
 sertraline 50 mg tablet Commonly known as:  ZOLOFT Take 75 mg by mouth nightly. * Notice: This list has 2 medication(s) that are the same as other medications prescribed for you. Read the directions carefully, and ask your doctor or other care provider to review them with you. Follow-up Instructions Return if symptoms worsen or fail to improve. Patient Instructions What Is Myofascial Pain Syndrome? Myofascial pain syndrome is a chronic pain disorder that affects fascia (connective tissue that covers the muscles) and is characterized by muscle pain, tenderness, and spasm. The syndrome can involve a single muscle or a muscle group. Myofascial pain syndrome typically affects muscles in asymmetric areas of the body. The precise cause of the syndrome is unknown. As with most chronic pain conditions, associated symptoms may include poor sleep, fatigue, depression, and behavioral disturbances. In myofascial pain syndrome, there are focal tender points in muscles called trigger points. A trigger point is usually within a taut band of muscle that can be felt by the examiner. They can ultimately be identified by the examiner applying pressure with one to three fingers and the thumb. Patients usually exhibit a jump sign when a trigger point is pressed. The patient with a positive jump sign may wince, cry out, and withdraw from the pressure being applied by an examiner. The pain may be referred, or felt at a distance away from the area being compressed. Trigger points can occur in muscles, ligaments, fascia, and periosteum (the membrane surrounding a bone). Pain in trigger points can be made worse with activity or stress. Currently, four types of trigger points can be distinguished: 
 
-An active trigger point is an area of extreme tenderness usually within a taut muscle or muscle group 
-A latent trigger point is an inactive area with the potential to act like a trigger point 
-A secondary trigger point is a highly irritable area in a muscle or muscle group that can be activated due to a trigger point or overload in another muscle or muscle group 
-A satellite trigger point is a highly irritable spot in a muscle or muscle group that becomes active because the muscle is in the region of another trigger point Causes Of Myofascial Pain Syndrome No one single factor can be identified as causing myofascial pain syndrome. The syndrome may develop from a muscle injury or excessive strain on a certain muscle or muscle group, ligament, or tendon. In addition, the following factors may be contributors: 
 
Trauma to the discs between the backbones, or vertebrae Inflammatory conditions Lack of blood flow to heart muscle Deconditioning from lack of exercise General fatigue Nutritional deficiencies Hormonal changes Obesity Intense cooling of parts of the body Use of tobacco 
Repetitive motions Overuse of a muscle Alcohol or drug abuse Long-term emotional stress Treatments For Myofascial Pain Syndrome Myofascial pain syndrome is best treated with a team of various medical professionals and various forms of therapies. The treatment team may consist of a primary care physician, a specialist in [address], nurses, physical and occupational therapists, massage therapists, and psychologists. The therapies may be carried out through drug and non-drug means.  Current practice is combining non-drug therapies with short-term drug therapies for longer lasting and maximal benefits. Various drugs can be used in the treatment of myofascial pain syndrome. Non-steroidal anti-inflammatory drugs (NSAIDs) such as aspirin (Justina), ibuprofen (Advil), and naproxen sodium (Aleve) can be used short term to reduce acute pain and inflammation. Acetaminophen (Tylenol) and oral narcotics such as oxycodone and hydrocodone may also be used in the short term for pain relief. Tricyclic antidepressants such as trazodone (Desyrel) and amitriptyline (Elavil) can be used at bedtime to improve sleep as well as relieve pain. Muscle relaxants such as cyclobenzaprine (Flexeril) and carisoprodol (Soma) can be used to relax muscle spasm and improve sleep, as they can have a sedating effect. Antidepressants such as fluoxetine (Prozac), sertraline (Zoloft), and duloxetine (Cymbalta) can be helpful with the chronic pain of myofascial pain syndrome. Anti-seizure medications such as gabapentin (Neurontin) and pregabalin (Lyrica) can also be helpful with the chronic pain of the syndrome. Capsaicin cream, a topical pain reliever derived from chili peppers, may also be helpful with the chronic pain of myofascial pain syndrome. Injections can also be utilized in the treatment of myofascial pain syndrome. Trigger point injections and botulinum toxin (Botox) injections are two areas of recent interest. Trigger point injections involve direct injection of a local anesthetic into the trigger point. This method is very effective when there are only a few precisely located trigger points. Botox can also be directly injected into trigger points. This method has produced inconsistent results. Various non-drug therapies can be utilized in the treatment of myofascial pain syndrome. Physical therapy is one of the best treatments for the syndrome.  The stretch and spray method has also been used with some success for treatment of the syndrome. It involves spraying the muscle or muscle group containing the trigger point with a coolant, such as flourimethane, and then slowly stretching the muscle. Massage therapy is another non-drug treatment used in the treatment of the syndrome. Massage therapists exert ischemic compression, which is the application of progressively stronger pressure on a trigger point for the purpose of eliminating its tenderness. Conclusion Myofascial pain syndrome is a chronic pain disorder that affects muscle and the fascia covering the muscle. Key to the diagnosis and treatment of the syndrome is the identification of trigger points, which are areas of extreme tenderness in bands of taut muscle. No one knows the exact cause of the disorder. The treatment of myofascial pain syndrome is best done by a multidisciplinary team utilizing various drug and non-drug therapies. The combination of physical therapy, trigger point injections, and massage are routinely used with some success in the treatment of myofascial pain syndrome. [email or web address]  
  
Carpal Tunnel Syndrome: Exercises Your Care Instructions Here are some examples of typical rehabilitation exercises for your condition. Start each exercise slowly. Ease off the exercise if you start to have pain. Your doctor or your physical or occupational therapist will tell you when you can start these exercises and which ones will work best for you. How to do the exercises Note: When you no longer have pain or numbness, you can do exercises to help prevent carpal tunnel syndrome from coming back. Do not do any stretch or movement that is uncomfortable or painful. Warm-up stretches 1. Rotate your wrist up, down, and from side to side. Repeat 4 times. 2. Stretch your fingers far apart. Relax them, and then stretch them again. Repeat 4 times. 3. Stretch your thumb by pulling it back gently, holding it, and then releasing it. Repeat 4 times. Prayer stretch 1. Start with your palms together in front of your chest just below your chin. 2. Slowly lower your hands toward your waistline, keeping your hands close to your stomach and your palms together until you feel a mild to moderate stretch under your forearms. 3. Hold for at least 15 to 30 seconds. Repeat 2 to 4 times. Wrist flexor stretch 1. Extend your arm in front of you with your palm up. 2. Bend your wrist, pointing your hand toward the floor. 3. With your other hand, gently bend your wrist farther until you feel a mild to moderate stretch in your forearm. 4. Hold for at least 15 to 30 seconds. Repeat 2 to 4 times. Wrist extensor stretch Repeat steps 1 through 4 of the stretch above, but begin with your extended hand palm down. Follow-up care is a key part of your treatment and safety. Be sure to make and go to all appointments, and call your doctor if you are having problems. It's also a good idea to know your test results and keep a list of the medicines you take. Where can you learn more? Go to http://juan ramon-chuy.info/. Enter J966 in the search box to learn more about \"Carpal Tunnel Syndrome: Exercises. \" Current as of: May 23, 2016 Content Version: 11.2 © 9343-1354 [employer]. Care instructions adapted under license by [employer] (which disclaims liability or warranty for this information). If you have questions about a medical condition or this instruction, always ask your healthcare professional. [place] [address] any warranty or liability for your use of this information. Introducing [facility]! Dear Blenda Claude: Thank you for requesting a brick&mobile account. Our records indicate that you already have an active brick[employer]mobile account. You can access your account anytime at [email or web address] Did you know that you can access your hospital and ER discharge instructions at any time in [employer]? You can also review all of your test results from your hospital stay or ER visit. Additional Information If you have questions, please visit the Frequently Asked Questions section of the [employer] website at [email or web address] Remember, [employer] is NOT to be used for urgent needs. For medical emergencies, dial 911. Now available from your iPhone and Android! Please provide this summary of care documentation to your next provider. Your primary care clinician is listed as Harriet Mcneill. If you have any questions after today's visit, please call [phone].

## 2017-04-26 NOTE — TELEPHONE ENCOUNTER
Appt scheduled for Dr. Cristofer Jiménez for 05-19-17 @3:00pm arrival [place] [address] office, Patient aware.  Referral is in [facility] [address] from Dr. Marge Quiroga but not sure how to key this appt to their referral.

## 2017-04-26 NOTE — PROGRESS NOTES
[address]  [address], [address]  [facility], [address]  Phone: [phone]  Fax: [phone]        Christine Roche  : 1975  PCP: Neva Delacruz MD  2017    PROGRESS NOTE      ASSESSMENT AND PLAN    Alea Manzano comes in to the office today for a cervical PT f/u. She found great relief with her visits noting that her pain free ROM has improved. Pt will continue doing a HEP for further relief. Pt also mentions she went to see Dr. Maria Ines Haney and was evaluated for her bilateral hand numbness. She had a BUE EMG which shows severe bilateral carpal tunnel syndrome. Pt was referred to orthopedics for further treatment. She reports drowsiness with taking diclofenac and was advised to switch to an OTC NSAID. Pt will f/u prn. Alea was seen today for follow-up and back pain. Diagnoses and all orders for this visit:    Bilateral carpal tunnel syndrome    Myofascial pain    Facet syndrome    Other orders  -     Cancel: REFERRAL TO ORTHOPEDICS         Follow-up Disposition:  Return if symptoms worsen or fail to improve. HISTORY OF PRESENT ILLNESS  Kevin Mcdonnell is a 35 y.o. female. A&P / HPI from 3/15/2017:  Kevin Mcdonnell comes in to the office today c/o 4-10/10 aching pain that encompasses her entire body x several years. She appears to have several potential pain generators causing her diffuse pain. I believe the pain in her cervical region is due to myofascial pain while the pain in her lumbar region is predominantly due to facet syndrome.     We will seek to address the myofascial pain first with PT. Pt was given an occipital nerve block with immediate relief. We will address her facet syndrome at a later visit. Pt was prescribed diclofenac 75mg BID prn. The risks, benefits, and potential side effects of this medication were discussed.      Pt was consulted on her smoking habits.  We discussed the risks of smoking and she was advised to quit to improve her health.      Pt will f/u in 6 weeks or prn. Updates from 04/26/17:  Pt presents for a cervical PT f/u. She found great relief with her visits noting that her pain free ROM has improved. Pt also mentions she went to see Dr. Elizabet Yuan and was evaluated for her bilateral hand numbness. She had a BUE EMG which shows severe bilateral carpal tunnel syndrome. She reports drowsiness with taking diclofenac. 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 repeat in 10 years. 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 pylori test 6/9/14    placed on antibiotics and H2 blocker 6/11/14    PPD positive, treated     Tuberculosis     denies, 4/14/16    Vitamin D deficiency 6/9/14       Past Surgical History:   Procedure Laterality Date    ENDOMETRIAL CRYOABLATION      HX COLONOSCOPY  04/25/2016    DR. ESTRADA REPEAT IN 2026 (10 YEARS)    HX HERNIA REPAIR  9399    umbilical   .      MEDICATIONS      Current Outpatient Prescriptions   Medication Sig Dispense Refill    gabapentin (NEURONTIN) 400 mg capsule 1 three times a day 90 Cap 3    amLODIPine (NORVASC) 10 mg tablet Take 1 Tab by mouth daily. 30 Tab 11    atorvastatin (LIPITOR) 20 mg tablet Take 1 Tab by mouth nightly.  30 Tab 11    diclofenac EC (VOLTAREN) 75 mg EC tablet Take 1 Tab by mouth two (2) times daily as needed. 60 Tab 5    cloNIDine HCl (CATAPRES) 0.1 mg tablet Take 1 Tab by mouth two (2) times a day. 60 Tab 11    carvedilol (COREG) 25 mg tablet Take 1 Tab by mouth two (2) times daily (with meals). 60 Tab 11    polyethylene glycol (MIRALAX) 17 gram/dose powder Take 17 g by mouth daily. One to two caps daily      albuterol (PROVENTIL, VENTOLIN) 90 mcg/actuation inhaler Take 2 Puffs by inhalation every four (4) hours as needed for Wheezing. 17 g 0    desloratadine (CLARINEX) 5 mg tablet Take 1 Tab by mouth daily as needed for Allergies. 90 Tab 3    ferrous sulfate 325 mg (65 mg iron) tablet TAKE ONE TABLET BY MOUTH ONCE DAILY BEFORE BREAKFAST 90 Tab 1    GARLIC PO Take  by mouth.  ergocalciferol (ERGOCALCIFEROL) 50,000 unit capsule Take 1 Cap by mouth every seven (7) days. 12 Cap 0    sertraline (ZOLOFT) 50 mg tablet Take 75 mg by mouth nightly.  ferrous sulfate 325 mg (65 mg iron) tablet Take  by mouth daily.  buPROPion XL (WELLBUTRIN XL) 150 mg tablet Take 150 mg by mouth every morning.       Butalbital-Acetaminophen-Caff (FIORICET) -40 mg cap Take 1 tablet every 8 hours as needed for headache 12 Cap 0        ALLERGIES    Allergies   Allergen Reactions    Ace Inhibitors Swelling          SOCIAL HISTORY    Social History     Social History    Marital status:      Spouse name: N/A    Number of children: N/A    Years of education: N/A     Social History Main Topics    Smoking status: Current Every Day Smoker     Packs/day: 0.50     Years: 0.50    Smokeless tobacco: Never Used    Alcohol use 1.2 oz/week     2 Cans of beer per week      Comment: monthly 1 or 2    Drug use: Yes     Special: Marijuana, Cocaine      Comment: Cocaine stopped 2008, Marijuana 4/2/16 usually once a month or two    Sexual activity: Not Currently     Partners: Female, Male     Birth control/ protection: None     Other Topics Concern     Service Yes     [id number]    Blood Transfusions Yes     2012    Caffeine Concern No    Occupational Exposure No    Hobby Hazards No    Sleep Concern No    Stress Concern Yes    Weight Concern No    Special Diet No    Back Care No    Exercise No    Bike Helmet No    Seat Belt No    Self-Exams No     Social History Narrative     Social History Narrative      Problem Relation Age of Onset    Hypertension Mother     Diabetes Mother     Depression Mother     Substance Abuse Mother      Tobacco use    Migraines Mother     High Cholesterol Mother     Heart Attack Mother     Heart Failure Mother     Eczema Mother     Arthritis-osteo Mother     Hypertension Father     Diabetes Father     Substance Abuse Father      Tobacco use and drug abuse    Stroke Paternal Aunt     Heart Disease Maternal Grandfather     Hypertension Maternal Grandfather     High Cholesterol Maternal Grandfather     Diabetes Maternal Grandfather     Stroke Maternal Grandfather     Heart Attack Maternal Grandfather     Heart Failure Maternal Grandfather     Alcohol abuse Brother      Drug/Alcohol abuse    Substance Abuse Maternal Grandmother      Tobacco use - MGM, MGF, and brother,    Diabetes Maternal Grandmother     Hypertension Brother     High Cholesterol Brother     Diabetes Sister     Diabetes Paternal Grandmother     Heart Attack Brother     Heart Failure Brother          REVIEW OF SYSTEMS  Review of Systems   Constitutional: Negative for chills, diaphoresis, fever, malaise/fatigue and weight loss. Respiratory: Negative for shortness of breath. Cardiovascular: Negative for chest pain and leg swelling. Gastrointestinal: Negative for constipation, nausea and vomiting. Neurological: Negative for dizziness, tingling, seizures, loss of consciousness and headaches. Psychiatric/Behavioral: The patient does not have insomnia.            PHYSICAL EXAMINATION  Visit Vitals    BP (!) 143/96    Pulse 74    Temp 98.5 °F (36.9 °C) (Oral)    Resp 18    Ht 5' 3\" (1.6 m)    Wt 243 lb (110.2 kg)    LMP 03/30/2017 (Exact Date)    BMI 43.05 kg/m2       Pain Assessment  3/15/2017   Location of Pain Back   Location Modifiers (No Data)   Severity of Pain 4   Quality of Pain Aching;Dull; Sharp   Quality of Pain Comment stabbing   Duration of Pain A few days   Frequency of Pain Intermittent   Aggravating Factors Bending;Stretching;Exercise;Kneeling;Stairs; Walking;Standing;Squatting;Straightening; Other (Comment)   Limiting Behavior Yes   Relieving Factors Nothing           Constitutional:  Well developed, well nourished, in no acute distress. Psychiatric: Affect and mood are appropriate. Integumentary: No rashes or abrasions noted on exposed areas. SPINE/MUSCULOSKELETAL EXAM    Cervical spine:  Neck is midline. Normal muscle tone. No focal atrophy is noted. ROM pain free. Shoulder ROM intact. Tenderness to palpation particularly in the levator scapula. Negative Spurling's sign. Negative Tinel's sign. Negative Álvarez's sign.       Sensation in the bilateral arms grossly intact to light touch.      Lumbar spine:  No rash, ecchymosis, or gross obliquity. No fasciculations. No focal atrophy is noted. No pain with hip ROM. Full range of motion. Tenderness to palpation. No tenderness to palpation at the sciatic notch. SI joints non-tender. Trochanters non tender. Pain with back extension > flexion. Positive shopping cart sign.      Sensation in the bilateral legs grossly intact to light touch. MOTOR:      Biceps  Triceps Deltoids Wrist Ext Wrist Flex Hand Intrin   Right 5/5 5/5 5/5 5/5 5/5 5/5   Left 5/5 5/5 5/5 5/5 5/5 5/5             Hip Flex  Quads Hamstrings Ankle DF EHL Ankle PF   Right 5/5 5/5 5/5 5/5 5/5 5/5   Left 5/5 5/5 5/5 5/5 5/5 5/5     DTRs are 2+ biceps, triceps, brachioradialis, patella, and Achilles.     Negative Straight Leg raise.    Squat not tested. No difficulty with tandem gait.      Ambulation without assistive device. FWB.       RADIOGRAPHS  Cervical XR images taken on 3/15/2017 personally reviewed with patient:  1) Bridging osteophytes  2) Good overall alignment  3) Disc space narrowing at C5-6 and C6-7  4) No obvious fractures     Thoracic XR images taken on 3/15/2017 personally reviewed with patient:  1) No obvious fractures or instabilities     Lumbar XR images taken on 3/15/2017 personally reviewed with patient:  1) Slight hyperlordosis (72.4 degrees)  2) No obvious fractures or instabilities    BUE EMG taken on 3/31/2017 personally reviewed with patient:  NCS/EMG FINDINGS:  · Evaluation of the Left median motor and the Right median motor nerves showed prolonged distal onset latency (L9.8, R10.2 ms). · The Left ulnar motor, the Right ulnar motor, and the Right Median 2nd Digit sensory nerves showed reduced amplitude (L4.1, R3.5, R3.7 µV). · The Left Median 2nd Digit sensory nerve showed prolonged distal peak latency (8.8 ms), reduced amplitude (5.0 µV), and decreased conduction velocity (Wrist-2nd Digit, 16.7 m/s). · The Left ulnar sensory and Right ulnar sensory nerves were unremarkable.        INTERPRETATION: Findings above are compatible with bilateral severe carpal tunnel syndrome.       reviewed     Ms. Manzano has a reminder for a \"due or due soon\" health maintenance. I have asked that she contact her primary care provider for follow-up on this health maintenance.      This plan was reviewed with the patient and patient agrees. All questions were answered. More than half of this visit today was spent on counseling.     Written by Gisselle Ridley, as dictated by Dr. Shyam Saini, Dr. Betzy Hall, confirm that all documentation is accurate.

## 2017-05-31 ENCOUNTER — OFFICE VISIT (OUTPATIENT)
Dept: ORTHOPEDIC SURGERY | Age: 42
End: 2017-05-31

## 2017-05-31 VITALS
SYSTOLIC BLOOD PRESSURE: 118 MMHG | WEIGHT: 240 LBS | RESPIRATION RATE: 15 BRPM | DIASTOLIC BLOOD PRESSURE: 80 MMHG | HEIGHT: 63 IN | TEMPERATURE: 98.6 F | BODY MASS INDEX: 42.52 KG/M2 | HEART RATE: 81 BPM

## 2017-05-31 DIAGNOSIS — M79.672 LEFT FOOT PAIN: Primary | ICD-10-CM

## 2017-05-31 DIAGNOSIS — M25.572 LEFT ANKLE PAIN, UNSPECIFIED CHRONICITY: ICD-10-CM

## 2017-05-31 RX ORDER — CHOLECALCIFEROL (VITAMIN D3) 125 MCG
CAPSULE ORAL
COMMUNITY

## 2017-05-31 NOTE — MR AVS SNAPSHOT
Visit Information Date & Time Provider Department Dept. Phone Encounter #  
 5/31/2017  3:30 PM Apollo Lo, [address] [facility] [phone] Your Appointments 6/9/2017 10:50 AM  
New Patient with Marifer Carrion MD  
[address] [facility], [address] [facility]) Appt Note: DELILAH CARPAL TUNNEL SYNDROME REF DR Saskia King INS INS SYTEM; .  
 [address] [facility], [address] [facility] [address]  
[phone]  
  
   
 [address] [facility], [address] [facility] [address] 6/12/2017  2:45 PM  
Follow Up with Chong Cain MD  
[employer] [facility]) Appt Note: 3 month f/u  
 [address] [facility] [address]  
[phone]  
  
   
 [facility] [address]  
  
    
 7/11/2017  4:15 PM  
ROUTINE CARE with Leah Salvador MD  
[facility] ([facility]) Appt Note: routine f/u 5mo [address] [facility] [address]  
[address] [facility] [address] [facility] [address] Upcoming Health Maintenance Date Due INFLUENZA AGE 9 TO ADULT 8/1/2017 PAP AKA CERVICAL CYTOLOGY 8/21/2018 DTaP/Tdap/Td series (2 - Td) 10/25/2026 Allergies as of 5/31/2017  Review Complete On: 5/31/2017 By: Mayra Isidro LPN Severity Noted Reaction Type Reactions Ace Inhibitors  09/26/2012    Swelling Current Immunizations  Reviewed on 10/25/2016 Name Date Influenza Vaccine (Quad) PF 10/25/2016 Influenza Vaccine PF 12/11/2013 Pneumococcal Polysaccharide (PPSV-23) 10/25/2016 Tdap 10/25/2016 Not reviewed this visit You Were Diagnosed With   
  
 Codes Comments Left foot pain    -  Primary ICD-10-CM: G44.606 ICD-9-CM: 729.5 Left ankle pain, unspecified chronicity     ICD-10-CM: M25.572 ICD-9-CM: 719.47 Vitals BP Pulse Temp Resp Height(growth percentile) Weight(growth percentile) 118/80 81 98.6 °F (37 °C) 15 5' 3\" (1.6 m) 240 lb (108.9 kg) BMI OB Status Smoking Status 42.51 kg/m2 Having regular periods Current Every Day Smoker Vitals History BMI and BSA Data Body Mass Index Body Surface Area 42.51 kg/m 2 2.2 m 2 Preferred Pharmacy Pharmacy Name Phone [facility] [address] [phone] Your Updated Medication List  
  
   
This list is accurate as of: 5/31/17  5:38 PM.  Always use your most recent med list.  
  
  
  
  
 albuterol 90 mcg/actuation inhaler Commonly known as:  Nivia Spotted Take 2 Puffs by inhalation every four (4) hours as needed for Wheezing. amLODIPine 10 mg tablet Commonly known as:  Conchis Punter Take 1 Tab by mouth daily. atorvastatin 20 mg tablet Commonly known as:  LIPITOR Take 1 Tab by mouth nightly. buPROPion  mg tablet Commonly known as:  Neil Dross Take 150 mg by mouth every morning. butalbital-acetaminophen-caff -40 mg per capsule Commonly known as:  [employer] Take 1 tablet every 8 hours as needed for headache  
  
 carvedilol 25 mg tablet Commonly known as:  Norma Stable Take 1 Tab by mouth two (2) times daily (with meals). cloNIDine HCl 0.1 mg tablet Commonly known as:  CATAPRES Take 1 Tab by mouth two (2) times a day. desloratadine 5 mg tablet Commonly known as:  CLARINEX Take 1 Tab by mouth daily as needed for Allergies. diclofenac EC 75 mg EC tablet Commonly known as:  VOLTAREN Take 1 Tab by mouth two (2) times daily as needed. ergocalciferol 50,000 unit capsule Commonly known as:  ERGOCALCIFEROL Take 1 Cap by mouth every seven (7) days. * ferrous sulfate 325 mg (65 mg iron) tablet Take  by mouth daily. * ferrous sulfate 325 mg (65 mg iron) tablet TAKE ONE TABLET BY MOUTH ONCE DAILY BEFORE BREAKFAST  
  
 gabapentin 400 mg capsule Commonly known as:  NEURONTIN  
1 three times a day GARLIC PO Take  by mouth. MAGNESIUM-CALCIUM-FOLIC ACID PO Take  by mouth.  
  
 polyethylene glycol 17 gram/dose powder Commonly known as:  Marita Limb Take 17 g by mouth daily. One to two caps daily  
  
 sertraline 50 mg tablet Commonly known as:  ZOLOFT Take 75 mg by mouth nightly. VITAMIN D3 2,000 unit Tab Generic drug:  cholecalciferol (vitamin D3) Take  by mouth. * Notice: This list has 2 medication(s) that are the same as other medications prescribed for you. Read the directions carefully, and ask your doctor or other care provider to review them with you. We Performed the Following AMB POC XRAY, FOOT; COMPLETE, 3+ VIEW [40218 CPT(R)] POC XRAY, ANKLE; 2 VIEWS [57784 CPT(R)] Patient Instructions Please follow up in 2 weeks. You are advised to contact us if your condition worsens. Foot Pain: Care Instructions Your Care Instructions Foot injuries that cause pain and swelling are fairly common. Almost all sports or home repair projects can cause a misstep that ends up as foot pain. Normal wear and tear, especially as you get older, also can cause foot pain. Most minor foot injuries will heal on their own, and home treatment is usually all you need to do. If you have a severe injury, you may need tests and treatment. Follow-up care is a key part of your treatment and safety. Be sure to make and go to all appointments, and call your doctor if you are having problems. Its also a good idea to know your test results and keep a list of the medicines you take. How can you care for yourself at home? · Take pain medicines exactly as directed. ¨ If the doctor gave you a prescription medicine for pain, take it as prescribed.  
¨ If you are not taking a prescription pain medicine, ask your doctor if you can take an over-the-counter medicine. · Rest and protect your foot. Take a break from any activity that may cause pain. · Put ice or a cold pack on your foot for 10 to 20 minutes at a time. Put a thin cloth between the ice and your skin. · Prop up the sore foot on a pillow when you ice it or anytime you sit or lie down during the next 3 days. Try to keep it above the level of your heart. This will help reduce swelling. · Your doctor may recommend that you wrap your foot with an elastic bandage. Keep your foot wrapped for as long as your doctor advises. · If your doctor recommends crutches, use them as directed. · Wear roomy footwear. · As soon as pain and swelling end, begin gentle exercises of your foot. Your doctor can tell you which exercises will help. When should you call for help? Call 911 anytime you think you may need emergency care. For example, call if: 
· Your foot turns pale, white, blue, or cold. Call your doctor now or seek immediate medical care if: 
· You cannot move or stand on your foot. · Your foot looks twisted or out of its normal position. · Your foot is not stable when you step down. · You have signs of infection, such as: 
¨ Increased pain, swelling, warmth, or redness. ¨ Red streaks leading from the sore area. ¨ Pus draining from a place on your foot. ¨ A fever. · Your foot is numb or tingly. Watch closely for changes in your health, and be sure to contact your doctor if: 
· You do not get better as expected. · You have bruises from an injury that last longer than 2 weeks. Where can you learn more? Go to http://juan ramon-chuy.info/. Enter S246 in the search box to learn more about \"Foot Pain: Care Instructions. \" Current as of: May 23, 2016 Content Version: 11.2 © 3488-7120 [employer].  Care instructions adapted under license by [employer] (which disclaims liability or warranty for this information). If you have questions about a medical condition or this instruction, always ask your healthcare professional. [address] any warranty or liability for your use of this information. Introducing [facility]! Dear Harpreet Dean: Thank you for requesting a [employer] account. Our records indicate that you already have an active [employer] account. You can access your account anytime at [email or web address] Did you know that you can access your hospital and ER discharge instructions at any time in [employer]? You can also review all of your test results from your hospital stay or ER visit. Additional Information If you have questions, please visit the Frequently Asked Questions section of the [employer] website at [email or web address] Remember, [employer] is NOT to be used for urgent needs. For medical emergencies, dial 911. Now available from your iPhone and Android! Please provide this summary of care documentation to your next provider. Your primary care clinician is listed as Tammy Harding. If you have any questions after today's visit, please call [phone].

## 2017-05-31 NOTE — PROCEDURES
FOOT X RAYS 3 VIEWS Left   5/31/2017     Non weight bearing     X RAYS AT [address] [place] [address]  5/31/2017      Bones: No fractures or dislocations. No focal osteolytic or osteoblastic process     Bone Spurs: No significant bone spurs  Alignment foot:  WNL   Joint Condition: No Significant OA  Soft Tissues: Normal, No radiopaque foreign body and No abnormal calcific densities to soft tissues   No ankle joint effusion in lateral projection. Mineralization: Suggests  no Osteopenia    I have personally reviewed the results of the above study. The interpretation of this study is my professional opinion                ANKLE X RAYS 3 VIEWS Left   5/31/2017    Non weigh bearing xrays     X RAYS AT [address] [place] [address]  5/31/2017    Bones: No fractures or dislocations. No focal osteolytic or osteoblastic process     Bone Spurs: No significant bone spurs  Alignment: Ankle mortise alignment is congruent, Tibial plafond and talar dome intact              No Osteochondral defects seen   Joint: No Significant OA changes present  Soft Tissues: Normal, No radiopaque foreign body     No abnormal calcific densities to soft tissues    No ankle joint effusion in lateral projection. Mineralization: Suggests no Osteopenia    I have personally reviewed the results of the above study.  The interpretation of this study is my professional opinion

## 2017-05-31 NOTE — PATIENT INSTRUCTIONS
Please follow up in 2 weeks. You are advised to contact us if your condition worsens. Foot Pain: Care Instructions  Your Care Instructions  Foot injuries that cause pain and swelling are fairly common. Almost all sports or home repair projects can cause a misstep that ends up as foot pain. Normal wear and tear, especially as you get older, also can cause foot pain. Most minor foot injuries will heal on their own, and home treatment is usually all you need to do. If you have a severe injury, you may need tests and treatment. Follow-up care is a key part of your treatment and safety. Be sure to make and go to all appointments, and call your doctor if you are having problems. Its also a good idea to know your test results and keep a list of the medicines you take. How can you care for yourself at home? · Take pain medicines exactly as directed. ¨ If the doctor gave you a prescription medicine for pain, take it as prescribed. ¨ If you are not taking a prescription pain medicine, ask your doctor if you can take an over-the-counter medicine. · Rest and protect your foot. Take a break from any activity that may cause pain. · Put ice or a cold pack on your foot for 10 to 20 minutes at a time. Put a thin cloth between the ice and your skin. · Prop up the sore foot on a pillow when you ice it or anytime you sit or lie down during the next 3 days. Try to keep it above the level of your heart. This will help reduce swelling. · Your doctor may recommend that you wrap your foot with an elastic bandage. Keep your foot wrapped for as long as your doctor advises. · If your doctor recommends crutches, use them as directed. · Wear roomy footwear. · As soon as pain and swelling end, begin gentle exercises of your foot. Your doctor can tell you which exercises will help. When should you call for help? Call 911 anytime you think you may need emergency care.  For example, call if:  · Your foot turns pale, white, blue, or cold. Call your doctor now or seek immediate medical care if:  · You cannot move or stand on your foot. · Your foot looks twisted or out of its normal position. · Your foot is not stable when you step down. · You have signs of infection, such as:  ¨ Increased pain, swelling, warmth, or redness. ¨ Red streaks leading from the sore area. ¨ Pus draining from a place on your foot. ¨ A fever. · Your foot is numb or tingly. Watch closely for changes in your health, and be sure to contact your doctor if:  · You do not get better as expected. · You have bruises from an injury that last longer than 2 weeks. Where can you learn more? Go to http://juan ramon-chuy.info/. Enter V756 in the search box to learn more about \"Foot Pain: Care Instructions. \"  Current as of: May 23, 2016  Content Version: 11.2  © 1432-1387 [employer]. Care instructions adapted under license by [employer] (which disclaims liability or warranty for this information). If you have questions about a medical condition or this instruction, always ask your healthcare professional. [place] [address] any warranty or liability for your use of this information.

## 2017-05-31 NOTE — PROGRESS NOTES
AMBULATORY PROGRESS NOTE      Patient: Jag Panchal             MRN: [id number]     SSN: [id number] Body mass index is 42.51 kg/(m^2). YOB: 1975     AGE: 38 y.o. EX: female    PCP: Rad Carranza MD    IMPRESSION/DIAGNOSIS AND TREATMENT PLAN     DIAGNOSES  1. Left foot pain    2. Left ankle pain, unspecified chronicity        Orders Placed This Encounter    [29748] Foot Min 3V    [21180] Ankle 2V      Alea Manzano understands her diagnoses and the proposed plan. Plan:    1) Continue Voltaren 75 mg   2) Continue activity as tolerated    RTO - 2 weeks    HPI AND EXAMINATION     Alea Manzano IS A 38 y.o. female who presents to my outpatient office complaining of: left foot pain. Patient localizes her pain on the dorsal aspect of her left foot. She has pain after stepping down from her bed last Thursday. The pain slowly resolved, but continues to cause her discomfort. She did not fall. As such, last week, Thursday, six days ago she woke up from a nap and she stepped down from her bed onto the floor and had some immediate pain. She did not fall and did not twist her ankle, she just stepped downward. She then developed dorsolateral left foot pain that lasted for about 24-36 hours. There is no history of gout and no history of inflammatory arthropathy. She has no history of fevers, shakes, chills, or night sweats. She states her pain is much improved over the last two or three days. She does have a history of low Vitamin D level. At one point, she did take 50,000 units of Vitamin D once a week for about three months. Now, she is taking, I believe 5,000 IU one tablet a day. She has been taking Diclofenac for her hands for some arthritic changes of her hand and a history of carpal tunnel syndrome of her hands. In fact, she is getting ready to have carpal tunnel surgery by, I believe, Dr. Mini Navarro in the next few weeks or so.  After seeing her today, my impression is mild dorsolateral foot pain along the cuboid. Recommendation is to see her again in two weeks for repeat x-rays and assessment of her left foot and ankle. ANKLE/FOOT left  Psychiatry: Alert, Oriented x 3; Speech normal in context and clarity,            Memory intact grossly, no involuntary movements - tremors, no dementia  Gait: normal  Tenderness: mild tenderness diffuse tenderness over the dorsal aspect of her left foot (cuboid region)  Cutaneous: WNL   Joint Motion: WNL  Joint / Tendon Stability: No Ankle or Subtalar instability or joint laxity. No peroneal sublux ability or dislocation  Alignment: Normal Foot Alignment and Flexible  Neuro Motor/Sensory: NL/NL  Vascular: NL foot/ankle pulses  Lymphatics: No extremity lymphedema, No calf swelling, no tenderness to calf muscles. 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 repeat in 10 years. 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 pylori test 6/9/14    placed on antibiotics and H2 blocker 6/11/14    PPD positive, treated     Tuberculosis     denies, 4/14/16    Vitamin D deficiency 6/9/14     Current Outpatient Prescriptions   Medication Sig    cholecalciferol, vitamin D3, (VITAMIN D3) 2,000 unit tab Take  by mouth.     CALCIUM CARB/MAG CARB/FOLIC AC (MAGNESIUM-CALCIUM-FOLIC ACID PO) Take  by mouth.  desloratadine (CLARINEX) 5 mg tablet Take 1 Tab by mouth daily as needed for Allergies.  gabapentin (NEURONTIN) 400 mg capsule 1 three times a day    amLODIPine (NORVASC) 10 mg tablet Take 1 Tab by mouth daily.  atorvastatin (LIPITOR) 20 mg tablet Take 1 Tab by mouth nightly.  diclofenac EC (VOLTAREN) 75 mg EC tablet Take 1 Tab by mouth two (2) times daily as needed.  ferrous sulfate 325 mg (65 mg iron) tablet TAKE ONE TABLET BY MOUTH ONCE DAILY BEFORE BREAKFAST    GARLIC PO Take  by mouth.  cloNIDine HCl (CATAPRES) 0.1 mg tablet Take 1 Tab by mouth two (2) times a day.  carvedilol (COREG) 25 mg tablet Take 1 Tab by mouth two (2) times daily (with meals).  sertraline (ZOLOFT) 50 mg tablet Take 75 mg by mouth nightly.  ferrous sulfate 325 mg (65 mg iron) tablet Take  by mouth daily.  polyethylene glycol (MIRALAX) 17 gram/dose powder Take 17 g by mouth daily. One to two caps daily    buPROPion XL (WELLBUTRIN XL) 150 mg tablet Take 150 mg by mouth every morning.  Butalbital-Acetaminophen-Caff (FIORICET) -40 mg cap Take 1 tablet every 8 hours as needed for headache    albuterol (PROVENTIL, VENTOLIN) 90 mcg/actuation inhaler Take 2 Puffs by inhalation every four (4) hours as needed for Wheezing.  ergocalciferol (ERGOCALCIFEROL) 50,000 unit capsule Take 1 Cap by mouth every seven (7) days. No current facility-administered medications for this visit. Allergies   Allergen Reactions    Ace Inhibitors Swelling     Past Surgical History:   Procedure Laterality Date    ENDOMETRIAL CRYOABLATION      HX COLONOSCOPY  04/25/2016    DR. ESTRADA REPEAT IN 2026 (10 YEARS)    HX HERNIA REPAIR  8114    umbilical     Social History     Occupational History    Not on file.      Social History Main Topics    Smoking status: Current Every Day Smoker     Packs/day: 0.50     Years: 0.50    Smokeless tobacco: Never Used    Alcohol use 1.2 oz/week     2 Cans of beer per week      Comment: monthly 1 or 2    Drug use: Yes     Special: Marijuana, Cocaine      Comment: Cocaine stopped 2008, Marijuana 4/2/16 usually once a month or two    Sexual activity: Not Currently     Partners: Female, Male     Birth control/ protection: None     Family History   Problem Relation Age of Onset    Hypertension Mother     Diabetes Mother     Depression Mother     Substance Abuse Mother      Tobacco use    Migraines Mother     High Cholesterol Mother     Heart Attack Mother     Heart Failure Mother     Eczema Mother     Arthritis-osteo Mother     Hypertension Father     Diabetes Father     Substance Abuse Father      Tobacco use and drug abuse    Stroke Paternal Aunt     Heart Disease Maternal Grandfather     Hypertension Maternal Grandfather     High Cholesterol Maternal Grandfather     Diabetes Maternal Grandfather     Stroke Maternal Grandfather     Heart Attack Maternal Grandfather     Heart Failure Maternal Grandfather     Alcohol abuse Brother      Drug/Alcohol abuse    Substance Abuse Maternal Grandmother      Tobacco use - MGM, MGF, and brother,    Diabetes Maternal Grandmother     Hypertension Brother     High Cholesterol Brother     Diabetes Sister     Diabetes Paternal Grandmother     Heart Attack Brother     Heart Failure Brother         REVIEW OF SYSTEMS : 5/31/2017  ALL BELOW ARE Negative except : SEE HPI     CONSTITUTIONAL: denies chills, fatigue, fever, weight change   PSYCH: denies anxiety, depression, irritability or mood swings   ENT: denies - headaches, hearing change, nasal congestion, oral lesions, or sore throat   HEM/ONC denies - bleeding problems, bruising, pallor or swollen lymph nodes   ENDO: denies hot flashes, polydipsia/polyuria or temperature intolerance   RESP: denies - cough, shortness of breath or wheezing   CV: denies - chest pain, edema or palpitations, MACKENZIE  GI: denies - abdominal pain, change in bowel habits, constipation, diarrhea or nausea/vomiting   : denies - dysuria, hematuria, incontinence, pelvic pain   MSK: denies  - See HPI. NEURO: denies - confusion, headaches, seizures or weakness   DERM: denies - dry skin, hair changes, rash or skin lesion changes  VASCULAR: Peripheral Vascular: No calf pain, vascular vein tenderness to calf pain              No calf throbbing, posterior knee throbbing pain    DIAGNOSTIC IMAGING     FOOT X RAYS 3 VIEWS Left   5/31/2017     Non weight bearing     X RAYS AT [address] [place] [address]  5/31/2017      Bones: No fractures or dislocations. No focal osteolytic or osteoblastic process     Bone Spurs: No significant bone spurs  Alignment foot:  WNL   Joint Condition: No Significant OA  Soft Tissues: Normal, No radiopaque foreign body and No abnormal calcific densities to soft tissues   No ankle joint effusion in lateral projection. Mineralization: Suggests  no Osteopenia    I have personally reviewed the results of the above study. The interpretation of this study is my professional opinion                ANKLE X RAYS 3 VIEWS Left   5/31/2017    Non weigh bearing xrays     X RAYS AT [address] [place] [address]  5/31/2017    Bones: No fractures or dislocations. No focal osteolytic or osteoblastic process     Bone Spurs: No significant bone spurs  Alignment: Ankle mortise alignment is congruent, Tibial plafond and talar dome intact              No Osteochondral defects seen   Joint: No Significant OA changes present  Soft Tissues: Normal, No radiopaque foreign body     No abnormal calcific densities to soft tissues    No ankle joint effusion in lateral projection. Mineralization: Suggests no Osteopenia    I have personally reviewed the results of the above study.  The interpretation of this study is my professional opinion           Written by Valery Jolly, as dictated by Nikita Alba MD. I, Nikita Hood MD, confirm that all documentation is accurate.

## 2017-07-11 ENCOUNTER — OFFICE VISIT (OUTPATIENT)
Dept: FAMILY MEDICINE CLINIC | Age: 42
End: 2017-07-11

## 2017-07-11 ENCOUNTER — HOSPITAL ENCOUNTER (OUTPATIENT)
Dept: LAB | Age: 42
Discharge: HOME | End: 2017-07-11
Payer: MEDICAID

## 2017-07-11 VITALS
HEIGHT: 63 IN | WEIGHT: 241 LBS | TEMPERATURE: 98.3 F | HEART RATE: 74 BPM | OXYGEN SATURATION: 98 % | DIASTOLIC BLOOD PRESSURE: 84 MMHG | SYSTOLIC BLOOD PRESSURE: 136 MMHG | BODY MASS INDEX: 42.7 KG/M2 | RESPIRATION RATE: 16 BRPM

## 2017-07-11 DIAGNOSIS — I10 ESSENTIAL HYPERTENSION: Primary | ICD-10-CM

## 2017-07-11 DIAGNOSIS — E78.00 PURE HYPERCHOLESTEROLEMIA: ICD-10-CM

## 2017-07-11 DIAGNOSIS — R73.03 PREDIABETES: ICD-10-CM

## 2017-07-11 DIAGNOSIS — E55.9 VITAMIN D DEFICIENCY: ICD-10-CM

## 2017-07-11 DIAGNOSIS — G47.10 EXCESSIVE SLEEPINESS: ICD-10-CM

## 2017-07-11 DIAGNOSIS — Z72.0 TOBACCO USE: ICD-10-CM

## 2017-07-11 DIAGNOSIS — D50.0 IRON DEFICIENCY ANEMIA DUE TO CHRONIC BLOOD LOSS: ICD-10-CM

## 2017-07-11 LAB
25(OH)D3 SERPL-MCNC: 28.7 NG/ML (ref 30–100)
ANION GAP BLD CALC-SCNC: 7 MMOL/L (ref 3–18)
BASOPHILS # BLD AUTO: 0 K/UL (ref 0–0.06)
BASOPHILS # BLD: 0 % (ref 0–2)
BUN SERPL-MCNC: 10 MG/DL (ref 7–18)
BUN/CREAT SERPL: 14 (ref 12–20)
CALCIUM SERPL-MCNC: 9.1 MG/DL (ref 8.5–10.1)
CHLORIDE SERPL-SCNC: 103 MMOL/L (ref 100–108)
CO2 SERPL-SCNC: 29 MMOL/L (ref 21–32)
CREAT SERPL-MCNC: 0.7 MG/DL (ref 0.6–1.3)
DIFFERENTIAL METHOD BLD: ABNORMAL
EOSINOPHIL # BLD: 0.1 K/UL (ref 0–0.4)
EOSINOPHIL NFR BLD: 2 % (ref 0–5)
ERYTHROCYTE [DISTWIDTH] IN BLOOD BY AUTOMATED COUNT: 18.8 % (ref 11.6–14.5)
GLUCOSE SERPL-MCNC: 157 MG/DL (ref 74–99)
HBA1C MFR BLD: 8.5 % (ref 4.2–5.6)
HCT VFR BLD AUTO: 34 % (ref 35–45)
HGB BLD-MCNC: 10.8 G/DL (ref 12–16)
LYMPHOCYTES # BLD AUTO: 50 % (ref 21–52)
LYMPHOCYTES # BLD: 3 K/UL (ref 0.9–3.6)
MCH RBC QN AUTO: 29.1 PG (ref 24–34)
MCHC RBC AUTO-ENTMCNC: 31.8 G/DL (ref 31–37)
MCV RBC AUTO: 91.6 FL (ref 74–97)
MONOCYTES # BLD: 0.8 K/UL (ref 0.05–1.2)
MONOCYTES NFR BLD AUTO: 13 % (ref 3–10)
NEUTS SEG # BLD: 2.1 K/UL (ref 1.8–8)
NEUTS SEG NFR BLD AUTO: 35 % (ref 40–73)
PLATELET # BLD AUTO: 294 K/UL (ref 135–420)
PMV BLD AUTO: 10.1 FL (ref 9.2–11.8)
POTASSIUM SERPL-SCNC: 4 MMOL/L (ref 3.5–5.5)
RBC # BLD AUTO: 3.71 M/UL (ref 4.2–5.3)
SODIUM SERPL-SCNC: 139 MMOL/L (ref 136–145)
WBC # BLD AUTO: 6 K/UL (ref 4.6–13.2)

## 2017-07-11 PROCEDURE — 83036 HEMOGLOBIN GLYCOSYLATED A1C: CPT | Performed by: FAMILY MEDICINE

## 2017-07-11 PROCEDURE — 36415 COLL VENOUS BLD VENIPUNCTURE: CPT | Performed by: FAMILY MEDICINE

## 2017-07-11 PROCEDURE — 82306 VITAMIN D 25 HYDROXY: CPT | Performed by: FAMILY MEDICINE

## 2017-07-11 PROCEDURE — 80048 BASIC METABOLIC PNL TOTAL CA: CPT | Performed by: FAMILY MEDICINE

## 2017-07-11 PROCEDURE — 85025 COMPLETE CBC W/AUTO DIFF WBC: CPT | Performed by: FAMILY MEDICINE

## 2017-07-11 NOTE — MR AVS SNAPSHOT
Visit Information Date & Time Provider Department Dept. Phone Encounter #  
 7/11/2017  4:15 PM Evie Poole, [address] [id number] Follow-up Instructions Return in about 1 week (around 7/18/2017). Your Appointments 7/14/2017  2:40 PM  
Follow Up with Max Garza MD  
[facility] ([address]) Appt Note: patient called requesting appointment; 6/29/17- unconfirmed mailbox full plk; F/up/ r/s'd with the patient Oneil Orte[place] [id number]  
[phone] [address]  
  
    
 7/21/2017  3:00 PM  
New Patient with Jazmyne Hebert MD  
[address] [facility], [address] [facility]  [address]) Appt Note: DELILAH CARPAL TUNNEL SYNDROME REF DR Tito Castillo [facility]; DELILAH CARPAL TUNNEL SYNDROME REF DR FERNANDEZ/pt to arrive 10:15 pt aware HS Loc/; DELILAH CARPAL TUNNEL SYNDROME REF DR Tito Castillo INS IN SYSTEM AT HS OFFICE ARRIVE 30 MINS EARLY  
 [address] [facility], [address] [facility] [address] [phone]  
  
   
 [address] [facility], [address] [place] [id number] Upcoming Health Maintenance Date Due INFLUENZA AGE 9 TO ADULT 8/1/2017 PAP AKA CERVICAL CYTOLOGY 8/21/2018 DTaP/Tdap/Td series (2 - Td) 10/25/2026 Allergies as of 7/11/2017  Review Complete On: 7/11/2017 By: Evie Poole MD  
  
 Severity Noted Reaction Type Reactions Ace Inhibitors  09/26/2012    Swelling Current Immunizations  Reviewed on 10/25/2016 Name Date Influenza Vaccine (Quad) PF 10/25/2016 Influenza Vaccine PF 12/11/2013 Pneumococcal Polysaccharide (PPSV-23) 10/25/2016 Tdap 10/25/2016 Not reviewed this visit You Were Diagnosed With   
  
 Codes Comments Essential hypertension    -  Primary ICD-10-CM: I10 
ICD-9-CM: 401.9  Pure hypercholesterolemia     ICD-10-CM: E78.00 
 ICD-9-CM: 272.0 Tobacco use     ICD-10-CM: Z72.0 ICD-9-CM: 305.1 Iron deficiency anemia due to chronic blood loss     ICD-10-CM: D50.0 ICD-9-CM: 280.0 Vitamin D deficiency     ICD-10-CM: E55.9 ICD-9-CM: 268.9 Prediabetes     ICD-10-CM: R73.03 
ICD-9-CM: 790.29 Excessive sleepiness     ICD-10-CM: G47.10 ICD-9-CM: 780.54 Vitals BP Pulse Temp Resp Height(growth percentile) Weight(growth percentile) 136/84 (BP 1 Location: Left arm, BP Patient Position: Sitting) 74 98.3 °F (36.8 °C) (Oral) 16 5' 3\" (1.6 m) 241 lb (109.3 kg) SpO2 BMI OB Status Smoking Status 98% 42.69 kg/m2 Having regular periods Current Every Day Smoker Vitals History BMI and BSA Data Body Mass Index Body Surface Area  
 42.69 kg/m 2 2.2 m 2 Preferred Pharmacy Pharmacy Name Phone [facility] [address] [phone] Your Updated Medication List  
  
   
This list is accurate as of: 7/11/17  4:53 PM.  Always use your most recent med list.  
  
  
  
  
 albuterol 90 mcg/actuation inhaler Commonly known as:  Vernal Sinner Take 2 Puffs by inhalation every four (4) hours as needed for Wheezing. amLODIPine 10 mg tablet Commonly known as:  Harlon Doyne Take 1 Tab by mouth daily. atorvastatin 20 mg tablet Commonly known as:  LIPITOR Take 1 Tab by mouth nightly. buPROPion  mg tablet Commonly known as:  Jeraline Chu Take 150 mg by mouth every morning. butalbital-acetaminophen-caff -40 mg per capsule Commonly known as:  [employer] Take 1 tablet every 8 hours as needed for headache  
  
 carvedilol 25 mg tablet Commonly known as:  Pedro Hamper Take 1 Tab by mouth two (2) times daily (with meals). cloNIDine HCl 0.1 mg tablet Commonly known as:  CATAPRES Take 1 Tab by mouth two (2) times a day. diclofenac EC 75 mg EC tablet Commonly known as:  VOLTAREN  
 Take 1 Tab by mouth two (2) times daily as needed. ferrous sulfate 325 mg (65 mg iron) tablet TAKE ONE TABLET BY MOUTH ONCE DAILY BEFORE BREAKFAST  
  
 gabapentin 400 mg capsule Commonly known as:  NEURONTIN  
1 three times a day GARLIC PO Take  by mouth. MAGNESIUM-CALCIUM-FOLIC ACID PO Take  by mouth. sertraline 50 mg tablet Commonly known as:  ZOLOFT Take 75 mg by mouth nightly. VITAMIN D3 2,000 unit Tab Generic drug:  cholecalciferol (vitamin D3) Take  by mouth. We Performed the Following REFERRAL TO SLEEP STUDIES [REF99 Custom] Follow-up Instructions Return in about 1 week (around 7/18/2017). To-Do List   
 07/11/2017 Lab:  CBC WITH AUTOMATED DIFF   
  
 07/11/2017 Lab:  HEMOGLOBIN A1C W/O EAG   
  
 07/11/2017 Lab:  METABOLIC PANEL, BASIC   
  
 07/11/2017 Lab:  VITAMIN D, 25 HYDROXY Referral Information Referral ID Referred By Referred To  
  
 [id number] [facility] [address] CHÁVEZ [address] [place], [address] [place] [address] Str. Phone: [phone] Fax: [phone] Visits Status Start Date End Date 1 New Request 7/11/17 7/11/18 If your referral has a status of pending review or denied, additional information will be sent to support the outcome of this decision. Patient Instructions DASH Diet: Care Instructions Your Care Instructions The DASH diet is an eating plan that can help lower your blood pressure. DASH stands for Dietary Approaches to Stop Hypertension. Hypertension is high blood pressure. The DASH diet focuses on eating foods that are high in calcium, potassium, and magnesium. These nutrients can lower blood pressure. The foods that are highest in these nutrients are fruits, vegetables, low-fat dairy products, nuts, seeds, and legumes.  But taking calcium, potassium, and magnesium supplements instead of eating foods that are high in those nutrients does not have the same effect. The DASH diet also includes whole grains, fish, and poultry. The DASH diet is one of several lifestyle changes your doctor may recommend to lower your high blood pressure. Your doctor may also want you to decrease the amount of sodium in your diet. Lowering sodium while following the DASH diet can lower blood pressure even further than just the DASH diet alone. Follow-up care is a key part of your treatment and safety. Be sure to make and go to all appointments, and call your doctor if you are having problems. It's also a good idea to know your test results and keep a list of the medicines you take. How can you care for yourself at home? Following the DASH diet · Eat 4 to 5 servings of fruit each day. A serving is 1 medium-sized piece of fruit, ½ cup chopped or canned fruit, 1/4 cup dried fruit, or 4 ounces (½ cup) of fruit juice. Choose fruit more often than fruit juice. · Eat 4 to 5 servings of vegetables each day. A serving is 1 cup of lettuce or raw leafy vegetables, ½ cup of chopped or cooked vegetables, or 4 ounces (½ cup) of vegetable juice. Choose vegetables more often than vegetable juice. · Get 2 to 3 servings of low-fat and fat-free dairy each day. A serving is 8 ounces of milk, 1 cup of yogurt, or 1 ½ ounces of cheese. · Eat 6 to 8 servings of grains each day. A serving is 1 slice of bread, 1 ounce of dry cereal, or ½ cup of cooked rice, pasta, or cooked cereal. Try to choose whole-grain products as much as possible. · Limit lean meat, poultry, and fish to 2 servings each day. A serving is 3 ounces, about the size of a deck of cards. · Eat 4 to 5 servings of nuts, seeds, and legumes (cooked dried beans, lentils, and split peas) each week. A serving is 1/3 cup of nuts, 2 tablespoons of seeds, or ½ cup of cooked beans or peas. · Limit fats and oils to 2 to 3 servings each day. A serving is 1 teaspoon of vegetable oil or 2 tablespoons of salad dressing. · Limit sweets and added sugars to 5 servings or less a week. A serving is 1 tablespoon jelly or jam, ½ cup sorbet, or 1 cup of lemonade. · Eat less than 2,300 milligrams (mg) of sodium a day. If you limit your sodium to 1,500 mg a day, you can lower your blood pressure even more. Tips for success · Start small. Do not try to make dramatic changes to your diet all at once. You might feel that you are missing out on your favorite foods and then be more likely to not follow the plan. Make small changes, and stick with them. Once those changes become habit, add a few more changes. · Try some of the following: ¨ Make it a goal to eat a fruit or vegetable at every meal and at snacks. This will make it easy to get the recommended amount of fruits and vegetables each day. ¨ Try yogurt topped with fruit and nuts for a snack or healthy dessert. ¨ Add lettuce, tomato, cucumber, and onion to sandwiches. ¨ Combine a ready-made pizza crust with low-fat mozzarella cheese and lots of vegetable toppings. Try using tomatoes, squash, spinach, broccoli, carrots, cauliflower, and onions. ¨ Have a variety of cut-up vegetables with a low-fat dip as an appetizer instead of chips and dip. ¨ Sprinkle sunflower seeds or chopped almonds over salads. Or try adding chopped walnuts or almonds to cooked vegetables. ¨ Try some vegetarian meals using beans and peas. Add garbanzo or kidney beans to salads. Make burritos and tacos with mashed dahl beans or black beans. Where can you learn more? Go to http://juan ramon-chuy.info/. Enter V634 in the search box to learn more about \"DASH Diet: Care Instructions. \" Current as of: April 3, 2017 Content Version: 11.3 © 9188-9165 [employer].  Care instructions adapted under license by [address] (which disclaims liability or warranty for this information). If you have questions about a medical condition or this instruction, always ask your healthcare professional. [address] any warranty or liability for your use of this information. Stopping Smoking: Care Instructions Your Care Instructions Cigarette smokers crave the nicotine in cigarettes. Giving it up is much harder than simply changing a habit. Your body has to stop craving the nicotine. It is hard to quit, but you can do it. There are many tools that people use to quit smoking. You may find that combining tools works best for you. There are several steps to quitting. First you get ready to quit. Then you get support to help you. After that, you learn new skills and behaviors to become a nonsmoker. For many people, a necessary step is getting and using medicine. Your doctor will help you set up the plan that best meets your needs. You may want to attend a smoking cessation program to help you quit smoking. When you choose a program, look for one that has proven success. Ask your doctor for ideas. You will greatly increase your chances of success if you take medicine as well as get counseling or join a cessation program. 
Some of the changes you feel when you first quit tobacco are uncomfortable. Your body will miss the nicotine at first, and you may feel short-tempered and grumpy. You may have trouble sleeping or concentrating. Medicine can help you deal with these symptoms. You may struggle with changing your smoking habits and rituals. The last step is the tricky one: Be prepared for the smoking urge to continue for a time. This is a lot to deal with, but keep at it. You will feel better. Follow-up care is a key part of your treatment and safety.  Be sure to make and go to all appointments, and call your doctor if you are having problems. Its also a good idea to know your test results and keep a list of the medicines you take. How can you care for yourself at home? · Ask your family, friends, and coworkers for support. You have a better chance of quitting if you have help and support. · Join a support group, such as Nicotine Anonymous, for people who are trying to quit smoking. · Consider signing up for a smoking cessation program, such as the American Lung Association's Freedom from Smoking program. 
· Set a quit date. Pick your date carefully so that it is not right in the middle of a big deadline or stressful time. Once you quit, do not even take a puff. Get rid of all ashtrays and lighters after your last cigarette. Clean your house and your clothes so that they do not smell of smoke. · Learn how to be a nonsmoker. Think about ways you can avoid those things that make you reach for a cigarette. ¨ Avoid situations that put you at greatest risk for smoking. For some people, it is hard to have a drink with friends without smoking. For others, they might skip a coffee break with coworkers who smoke. ¨ Change your daily routine. Take a different route to work or eat a meal in a different place. · Cut down on stress. Calm yourself or release tension by doing an activity you enjoy, such as reading a book, taking a hot bath, or gardening. · Talk to your doctor or pharmacist about nicotine replacement therapy, which replaces the nicotine in your body. You still get nicotine but you do not use tobacco. Nicotine replacement products help you slowly reduce the amount of nicotine you need. These products come in several forms, many of them available over-the-counter: ¨ Nicotine patches ¨ Nicotine gum and lozenges ¨ Nicotine inhaler · Ask your doctor about bupropion (Wellbutrin) or varenicline (Chantix), which are prescription medicines. They do not contain nicotine.  They help you by reducing withdrawal symptoms, such as stress and anxiety. · Some people find hypnosis, acupuncture, and massage helpful for ending the smoking habit. · Eat a healthy diet and get regular exercise. Having healthy habits will help your body move past its craving for nicotine. · Be prepared to keep trying. Most people are not successful the first few times they try to quit. Do not get mad at yourself if you smoke again. Make a list of things you learned and think about when you want to try again, such as next week, next month, or next year. Where can you learn more? Go to http://juan ramon-chuy.info/. Enter L351 in the search box to learn more about \"Stopping Smoking: Care Instructions. \" Current as of: March 20, 2017 Content Version: 11.3 © 5183-4988 "[employer]. Care instructions adapted under license by [employer] (which disclaims liability or warranty for this information). If you have questions about a medical condition or this instruction, always ask your healthcare professional. [place] [address] any warranty or liability for your use of this information. Introducing [facility]! Dear Abiel Khan: Thank you for requesting a [employer] account. Our records indicate that you already have an active [employer] account. You can access your account anytime at [email or web address] Did you know that you can access your hospital and ER discharge instructions at any time in [employer]? You can also review all of your test results from your hospital stay or ER visit. Additional Information If you have questions, please visit the Frequently Asked Questions section of the [employer] website at [email or web address] Remember, [employer] is NOT to be used for urgent needs. For medical emergencies, dial 911. Now available from your iPhone and Android! Please provide this summary of care documentation to your next provider. Your primary care clinician is listed as Calixto Fuentes. If you have any questions after today's visit, please call [phone].

## 2017-07-11 NOTE — PROGRESS NOTES
Chief Complaint   Patient presents with    Hypertension     did not take BP medication today    Cholesterol Problem    Vitamin D Deficiency     1. Have you been to the ER, urgent care clinic since your last visit? Hospitalized since your last visit? No    2. Have you seen or consulted any other health care providers outside of the [address] [place] [address] since your last visit? Include any pap smears or colon screening.  No    Seen at [facility] did not go back  Requesting a prescription for claritin

## 2017-07-11 NOTE — PATIENT INSTRUCTIONS
DASH Diet: Care Instructions  Your Care Instructions  The DASH diet is an eating plan that can help lower your blood pressure. DASH stands for Dietary Approaches to Stop Hypertension. Hypertension is high blood pressure. The DASH diet focuses on eating foods that are high in calcium, potassium, and magnesium. These nutrients can lower blood pressure. The foods that are highest in these nutrients are fruits, vegetables, low-fat dairy products, nuts, seeds, and legumes. But taking calcium, potassium, and magnesium supplements instead of eating foods that are high in those nutrients does not have the same effect. The DASH diet also includes whole grains, fish, and poultry. The DASH diet is one of several lifestyle changes your doctor may recommend to lower your high blood pressure. Your doctor may also want you to decrease the amount of sodium in your diet. Lowering sodium while following the DASH diet can lower blood pressure even further than just the DASH diet alone. Follow-up care is a key part of your treatment and safety. Be sure to make and go to all appointments, and call your doctor if you are having problems. It's also a good idea to know your test results and keep a list of the medicines you take. How can you care for yourself at home? Following the DASH diet  · Eat 4 to 5 servings of fruit each day. A serving is 1 medium-sized piece of fruit, ½ cup chopped or canned fruit, 1/4 cup dried fruit, or 4 ounces (½ cup) of fruit juice. Choose fruit more often than fruit juice. · Eat 4 to 5 servings of vegetables each day. A serving is 1 cup of lettuce or raw leafy vegetables, ½ cup of chopped or cooked vegetables, or 4 ounces (½ cup) of vegetable juice. Choose vegetables more often than vegetable juice. · Get 2 to 3 servings of low-fat and fat-free dairy each day. A serving is 8 ounces of milk, 1 cup of yogurt, or 1 ½ ounces of cheese. · Eat 6 to 8 servings of grains each day.  A serving is 1 slice of bread, 1 ounce of dry cereal, or ½ cup of cooked rice, pasta, or cooked cereal. Try to choose whole-grain products as much as possible. · Limit lean meat, poultry, and fish to 2 servings each day. A serving is 3 ounces, about the size of a deck of cards. · Eat 4 to 5 servings of nuts, seeds, and legumes (cooked dried beans, lentils, and split peas) each week. A serving is 1/3 cup of nuts, 2 tablespoons of seeds, or ½ cup of cooked beans or peas. · Limit fats and oils to 2 to 3 servings each day. A serving is 1 teaspoon of vegetable oil or 2 tablespoons of salad dressing. · Limit sweets and added sugars to 5 servings or less a week. A serving is 1 tablespoon jelly or jam, ½ cup sorbet, or 1 cup of lemonade. · Eat less than 2,300 milligrams (mg) of sodium a day. If you limit your sodium to 1,500 mg a day, you can lower your blood pressure even more. Tips for success  · Start small. Do not try to make dramatic changes to your diet all at once. You might feel that you are missing out on your favorite foods and then be more likely to not follow the plan. Make small changes, and stick with them. Once those changes become habit, add a few more changes. · Try some of the following:  ¨ Make it a goal to eat a fruit or vegetable at every meal and at snacks. This will make it easy to get the recommended amount of fruits and vegetables each day. ¨ Try yogurt topped with fruit and nuts for a snack or healthy dessert. ¨ Add lettuce, tomato, cucumber, and onion to sandwiches. ¨ Combine a ready-made pizza crust with low-fat mozzarella cheese and lots of vegetable toppings. Try using tomatoes, squash, spinach, broccoli, carrots, cauliflower, and onions. ¨ Have a variety of cut-up vegetables with a low-fat dip as an appetizer instead of chips and dip. ¨ Sprinkle sunflower seeds or chopped almonds over salads. Or try adding chopped walnuts or almonds to cooked vegetables. ¨ Try some vegetarian meals using beans and peas. Add garbanzo or kidney beans to salads. Make burritos and tacos with mashed dahl beans or black beans. Where can you learn more? Go to http://juan ramon-chuy.info/. Enter D977 in the search box to learn more about \"DASH Diet: Care Instructions. \"  Current as of: April 3, 2017  Content Version: 11.3  © 5228-2993 [employer]. Care instructions adapted under license by [employer] (which disclaims liability or warranty for this information). If you have questions about a medical condition or this instruction, always ask your healthcare professional. [place] [address] any warranty or liability for your use of this information. Stopping Smoking: Care Instructions  Your Care Instructions  Cigarette smokers crave the nicotine in cigarettes. Giving it up is much harder than simply changing a habit. Your body has to stop craving the nicotine. It is hard to quit, but you can do it. There are many tools that people use to quit smoking. You may find that combining tools works best for you. There are several steps to quitting. First you get ready to quit. Then you get support to help you. After that, you learn new skills and behaviors to become a nonsmoker. For many people, a necessary step is getting and using medicine. Your doctor will help you set up the plan that best meets your needs. You may want to attend a smoking cessation program to help you quit smoking. When you choose a program, look for one that has proven success. Ask your doctor for ideas. You will greatly increase your chances of success if you take medicine as well as get counseling or join a cessation program.  Some of the changes you feel when you first quit tobacco are uncomfortable. Your body will miss the nicotine at first, and you may feel short-tempered and grumpy. You may have trouble sleeping or concentrating. Medicine can help you deal with these symptoms.  You may struggle with changing your smoking habits and rituals. The last step is the tricky one: Be prepared for the smoking urge to continue for a time. This is a lot to deal with, but keep at it. You will feel better. Follow-up care is a key part of your treatment and safety. Be sure to make and go to all appointments, and call your doctor if you are having problems. Its also a good idea to know your test results and keep a list of the medicines you take. How can you care for yourself at home? · Ask your family, friends, and coworkers for support. You have a better chance of quitting if you have help and support. · Join a support group, such as Nicotine Anonymous, for people who are trying to quit smoking. · Consider signing up for a smoking cessation program, such as the American Lung Association's Freedom from Smoking program.  · Set a quit date. Pick your date carefully so that it is not right in the middle of a big deadline or stressful time. Once you quit, do not even take a puff. Get rid of all ashtrays and lighters after your last cigarette. Clean your house and your clothes so that they do not smell of smoke. · Learn how to be a nonsmoker. Think about ways you can avoid those things that make you reach for a cigarette. ¨ Avoid situations that put you at greatest risk for smoking. For some people, it is hard to have a drink with friends without smoking. For others, they might skip a coffee break with coworkers who smoke. ¨ Change your daily routine. Take a different route to work or eat a meal in a different place. · Cut down on stress. Calm yourself or release tension by doing an activity you enjoy, such as reading a book, taking a hot bath, or gardening. · Talk to your doctor or pharmacist about nicotine replacement therapy, which replaces the nicotine in your body. You still get nicotine but you do not use tobacco. Nicotine replacement products help you slowly reduce the amount of nicotine you need.  These products come in several forms, many of them available over-the-counter:  ¨ Nicotine patches  ¨ Nicotine gum and lozenges  ¨ Nicotine inhaler  · Ask your doctor about bupropion (Wellbutrin) or varenicline (Chantix), which are prescription medicines. They do not contain nicotine. They help you by reducing withdrawal symptoms, such as stress and anxiety. · Some people find hypnosis, acupuncture, and massage helpful for ending the smoking habit. · Eat a healthy diet and get regular exercise. Having healthy habits will help your body move past its craving for nicotine. · Be prepared to keep trying. Most people are not successful the first few times they try to quit. Do not get mad at yourself if you smoke again. Make a list of things you learned and think about when you want to try again, such as next week, next month, or next year. Where can you learn more? Go to http://juan ramon-chuy.info/. Enter A057 in the search box to learn more about \"Stopping Smoking: Care Instructions. \"  Current as of: March 20, 2017  Content Version: 11.3  © 7065-9333 [employer]. Care instructions adapted under license by [employer] (which disclaims liability or warranty for this information). If you have questions about a medical condition or this instruction, always ask your healthcare professional. [address] any warranty or liability for your use of this information.

## 2017-07-12 NOTE — PROGRESS NOTES
Alea HayesGradyElana, 38 y.o.,  female    SUBJECTIVE   ff-up    HTN-taking medications without problems. Stress test neg. She continues to smoke. Forgot to have labs drawn, reminded reprinted. [facility]- reports improved compliance on lipitor. Denies cp, sob, leg edema. She continues to smoke 1/2ppd. C/o difficulty staying up, constantly sleepy, falls asleep mid morning. Reports unrestful sleep, snoring per partner. anemia-  H/o  chronic menorrhagia has yet to see dr. Macarena Bassett, there was some discussion on endometrial ablation after D and C previously. she does feel tired, and on oral iron replacement for several years. Denies melena, constipation. Has had colonoscopy 4/2016 showing internal hemorrhoids, normal EGD/no polyp (Dr. David Page). She reports unable to ff-up with hematologist dr. Mj Smith due to insurance coverage. She says she is unable to see gyne due to cost.     Currently seeing dr. Celena White for bilateral hand pain, EMG reviewed c/w carpal tunnel. Reviewed labs a1c noted to be 7. She has been prediabetic previous check. Her diet is poor. Says saw psychologist in [facility], and was dx with bipolar disorder, previously was anxiety/depression. She does not agree with diagnosis and hence did not ff-up. ROS:  See HPI, all others negative        Patient Active Problem List   Diagnosis Code    HTN (hypertension) I10    Gastroesophageal reflux disease K21.9    Anxiety and depression F41.9, F32.9    Positive H. pylori test A04.8    Iron deficiency anemia D50.9    Vitamin D deficiency E55.9    Tobacco use Z72.0    Bilateral carpal tunnel syndrome G56.03    Pure hypercholesterolemia E78.00       Current Outpatient Prescriptions   Medication Sig Dispense Refill    cholecalciferol, vitamin D3, (VITAMIN D3) 2,000 unit tab Take  by mouth.  CALCIUM CARB/MAG CARB/FOLIC AC (MAGNESIUM-CALCIUM-FOLIC ACID PO) Take  by mouth.       gabapentin (NEURONTIN) 400 mg capsule 1 three times a day 90 Cap 3    amLODIPine (NORVASC) 10 mg tablet Take 1 Tab by mouth daily. 30 Tab 11    atorvastatin (LIPITOR) 20 mg tablet Take 1 Tab by mouth nightly. 30 Tab 11    diclofenac EC (VOLTAREN) 75 mg EC tablet Take 1 Tab by mouth two (2) times daily as needed. 60 Tab 5    ferrous sulfate 325 mg (65 mg iron) tablet TAKE ONE TABLET BY MOUTH ONCE DAILY BEFORE BREAKFAST 90 Tab 1    GARLIC PO Take  by mouth.  cloNIDine HCl (CATAPRES) 0.1 mg tablet Take 1 Tab by mouth two (2) times a day. 60 Tab 11    carvedilol (COREG) 25 mg tablet Take 1 Tab by mouth two (2) times daily (with meals). 60 Tab 11    Butalbital-Acetaminophen-Caff (FIORICET) -40 mg cap Take 1 tablet every 8 hours as needed for headache 12 Cap 0    albuterol (PROVENTIL, VENTOLIN) 90 mcg/actuation inhaler Take 2 Puffs by inhalation every four (4) hours as needed for Wheezing. 17 g 0    sertraline (ZOLOFT) 50 mg tablet Take 75 mg by mouth nightly.  buPROPion XL (WELLBUTRIN XL) 150 mg tablet Take 150 mg by mouth every morning. Allergies   Allergen Reactions    Ace Inhibitors Swelling       Past Medical History:   Diagnosis Date    Abnormal uterine bleeding (AUB)     Anxiety and depression     Asthma     Cardiac echocardiogram 01/27/2017    EF 55-60%. No WMA. Mod conc LVH. Gr 2 DDfx. Mild AI.  Cardiac nuclear imaging test 01/27/2017    Mod risk. Mod area of anterior apical ischemia likely. No infarction. EF 48%. Mild anterior apical hypk. Equivocal EKG on pharm stress test.   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 repeat in 10 years.    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 pylori test 6/9/14    placed on antibiotics and H2 blocker 6/11/14    PPD positive, treated     Renal duplex 11/17/2016    No significant RA stenosis.  Tuberculosis     denies, 4/14/16    Vitamin D deficiency 6/9/14       Social History     Social History    Marital status:      Spouse name: N/A    Number of children: N/A    Years of education: N/A     Occupational History    Not on file.      Social History Main Topics    Smoking status: Current Every Day Smoker     Packs/day: 0.50     Years: 0.50    Smokeless tobacco: Never Used    Alcohol use 1.2 oz/week     2 Cans of beer per week      Comment: monthly 1 or 2    Drug use: Yes     Special: Marijuana, Cocaine      Comment: Cocaine stopped 2008, Marijuana 4/2/16 usually once a month or two    Sexual activity: Not Currently     Partners: Female, Male     Birth control/ protection: None     Other Topics Concern     Service Yes     [id number]    Blood Transfusions Yes     2012    Caffeine Concern No    Occupational Exposure No    Hobby Hazards No    Sleep Concern No    Stress Concern Yes    Weight Concern No    Special Diet No    Back Care No    Exercise No    Bike Helmet No    Seat Belt No    Self-Exams No     Social History Narrative       Family History   Problem Relation Age of Onset    Hypertension Mother     Diabetes Mother     Depression Mother     Substance Abuse Mother      Tobacco use    Migraines Mother     High Cholesterol Mother     Heart Attack Mother     Heart Failure Mother     Eczema Mother     Arthritis-osteo Mother     Hypertension Father     Diabetes Father     Substance Abuse Father      Tobacco use and drug abuse    Stroke Paternal Aunt     Heart Disease Maternal Grandfather     Hypertension Maternal Grandfather     High Cholesterol Maternal Grandfather     Diabetes Maternal Grandfather     Stroke Maternal Grandfather     Heart Attack Maternal Grandfather     Heart Failure Maternal Grandfather     Alcohol abuse Brother      Drug/Alcohol abuse    Substance Abuse Maternal Grandmother      Tobacco use - MGM, MGF, and brother,    Diabetes Maternal Grandmother     Hypertension Brother     High Cholesterol Brother     Diabetes Sister     Diabetes Paternal Grandmother     Heart Attack Brother     Heart Failure Brother          OBJECTIVE    Physical Exam:     Visit Vitals    /84 (BP 1 Location: Left arm, BP Patient Position: Sitting)    Pulse 74    Temp 98.3 °F (36.8 °C) (Oral)    Resp 16    Ht 5' 3\" (1.6 m)    Wt 241 lb (109.3 kg)    SpO2 98%    BMI 42.69 kg/m2       General: alert, obese, AA, in no apparent distress or pain  Neck: supple, no adenopathy palpated  CVS: normal rate, regular rhythm, distinct S1 and S2  Lungs:clear to ausculation bilaterally, no crackles, wheezing or rhonchi noted  Extremities: no edema, no cyanosis,  Skin: warm, no lesions, rashes noted  Psych:  mood and affect normal    Results for orders placed or performed during the hospital encounter of 01/27/17   NUCLEAR STRESS TEST   Result Value Ref Range    Diagnosis      Test indication Chest Pain     Functional capacity      ECG Interp. Before Exercise      ECG Interp. During Exercise      Overall HR response to exercise appropriate     Overall BP response to exercise normal resting BP - appropriate response     Max. Systolic  mmHg    Max. Diastolic  mmHg    Max. Heart rate 109 BPM    [facility] treadmill score      Lam TM score result      Peak Ex METs 1.6 METS    Protocol name Leigh Coreas cardiac condition      Attending physician           ASSESSMENT/PLAN  Alea was seen today for hand pain.     Diagnoses and all orders for this visit:    Essential hypertension  Controlled, cont current regimen  on clonidine/coreg, norvasc, chlorthalidone  Counseled on smoking cessation    Iron deficiency anemia due to chronic blood loss   likely due to menorrhagia, she is mildly symptomatic  Pt says cannot see gyne at this time due to insurance issues. there was previous discussion on possible ablation. She has had D and Cs. Cont po fe at this time, take with vit c.   Colonoscopy/egd 4/2016 normal, internal hemorrhoids noted. .   Cont po fe supplementation  Recheck CBC today    Vitamin D deficiency  Currently on  vit d 50 k q weekly  Recheck Vit d Today    Prediabetes  a1c 6.2  Reiterated wt loss/diet    BMI 40.0-44.9, adult (HCC)    Anxiety and depression  Encouraged ff-up appt with CPA for further evaluation, pt is agreeable, and would schedule herself    Pure hypercholesterolemia-at goal LDL <130, cont statin  At goal cont lipitor    Tobacco use  Counseled on cessation    Allergic rhinitis  Cont  clarinex    Excessive sleepiness  Referral to sleep specialist    Follow-up Disposition:  Return in about 1 week (around 7/18/2017). Patient understands plan of care. Patient has provided input and agrees with goals.

## 2017-07-14 ENCOUNTER — TELEPHONE (OUTPATIENT)
Dept: CARDIOLOGY CLINIC | Age: 42
End: 2017-07-14

## 2017-07-14 ENCOUNTER — OFFICE VISIT (OUTPATIENT)
Dept: CARDIOLOGY CLINIC | Age: 42
End: 2017-07-14

## 2017-07-14 ENCOUNTER — HOSPITAL ENCOUNTER (OUTPATIENT)
Dept: LAB | Age: 42
Discharge: HOME | End: 2017-07-14
Payer: MEDICAID

## 2017-07-14 VITALS
OXYGEN SATURATION: 98 % | HEART RATE: 74 BPM | WEIGHT: 241 LBS | SYSTOLIC BLOOD PRESSURE: 126 MMHG | HEIGHT: 63 IN | BODY MASS INDEX: 42.7 KG/M2 | DIASTOLIC BLOOD PRESSURE: 84 MMHG

## 2017-07-14 DIAGNOSIS — E78.00 PURE HYPERCHOLESTEROLEMIA: ICD-10-CM

## 2017-07-14 DIAGNOSIS — I10 ESSENTIAL HYPERTENSION: Primary | ICD-10-CM

## 2017-07-14 LAB
ALBUMIN SERPL BCP-MCNC: 4 G/DL (ref 3.4–5)
ALBUMIN/GLOB SERPL: 1.1 {RATIO} (ref 0.8–1.7)
ALP SERPL-CCNC: 64 U/L (ref 45–117)
ALT SERPL-CCNC: 24 U/L (ref 13–56)
ANION GAP BLD CALC-SCNC: 7 MMOL/L (ref 3–18)
AST SERPL W P-5'-P-CCNC: 13 U/L (ref 15–37)
BASOPHILS # BLD AUTO: 0 K/UL (ref 0–0.1)
BASOPHILS # BLD: 0 % (ref 0–2)
BILIRUB SERPL-MCNC: 0.6 MG/DL (ref 0.2–1)
BUN SERPL-MCNC: 15 MG/DL (ref 7–18)
BUN/CREAT SERPL: 19 (ref 12–20)
CALCIUM SERPL-MCNC: 8.9 MG/DL (ref 8.5–10.1)
CHLORIDE SERPL-SCNC: 105 MMOL/L (ref 100–108)
CO2 SERPL-SCNC: 28 MMOL/L (ref 21–32)
CREAT SERPL-MCNC: 0.78 MG/DL (ref 0.6–1.3)
DIFFERENTIAL METHOD BLD: ABNORMAL
EOSINOPHIL # BLD: 0.1 K/UL (ref 0–0.4)
EOSINOPHIL NFR BLD: 2 % (ref 0–5)
ERYTHROCYTE [DISTWIDTH] IN BLOOD BY AUTOMATED COUNT: 18.9 % (ref 11.6–14.5)
GLOBULIN SER CALC-MCNC: 3.6 G/DL (ref 2–4)
GLUCOSE SERPL-MCNC: 150 MG/DL (ref 74–99)
HCT VFR BLD AUTO: 33.7 % (ref 35–45)
HGB BLD-MCNC: 10.9 G/DL (ref 12–16)
INR PPP: 1 (ref 0.8–1.2)
LYMPHOCYTES # BLD AUTO: 54 % (ref 21–52)
LYMPHOCYTES # BLD: 2.7 K/UL (ref 0.9–3.6)
MCH RBC QN AUTO: 29.4 PG (ref 24–34)
MCHC RBC AUTO-ENTMCNC: 32.3 G/DL (ref 31–37)
MCV RBC AUTO: 90.8 FL (ref 74–97)
MONOCYTES # BLD: 0.5 K/UL (ref 0.05–1.2)
MONOCYTES NFR BLD AUTO: 11 % (ref 3–10)
NEUTS SEG # BLD: 1.6 K/UL (ref 1.8–8)
NEUTS SEG NFR BLD AUTO: 33 % (ref 40–73)
PLATELET # BLD AUTO: 295 K/UL (ref 135–420)
PMV BLD AUTO: 10.2 FL (ref 9.2–11.8)
POTASSIUM SERPL-SCNC: 3.8 MMOL/L (ref 3.5–5.5)
PROT SERPL-MCNC: 7.6 G/DL (ref 6.4–8.2)
PROTHROMBIN TIME: 13 SEC (ref 11.5–15.2)
RBC # BLD AUTO: 3.71 M/UL (ref 4.2–5.3)
SODIUM SERPL-SCNC: 140 MMOL/L (ref 136–145)
WBC # BLD AUTO: 5 K/UL (ref 4.6–13.2)

## 2017-07-14 PROCEDURE — 36415 COLL VENOUS BLD VENIPUNCTURE: CPT

## 2017-07-14 PROCEDURE — 85025 COMPLETE CBC W/AUTO DIFF WBC: CPT

## 2017-07-14 PROCEDURE — 80053 COMPREHEN METABOLIC PANEL: CPT

## 2017-07-14 PROCEDURE — 85610 PROTHROMBIN TIME: CPT

## 2017-07-14 RX ORDER — SODIUM CHLORIDE 0.9 % (FLUSH) 0.9 %
5-10 SYRINGE (ML) INJECTION AS NEEDED
Status: CANCELLED | OUTPATIENT
Start: 2017-07-14

## 2017-07-14 RX ORDER — SODIUM CHLORIDE 0.9 % (FLUSH) 0.9 %
5-10 SYRINGE (ML) INJECTION EVERY 8 HOURS
Status: CANCELLED | OUTPATIENT
Start: 2017-07-14

## 2017-07-14 NOTE — MR AVS SNAPSHOT
Visit Information Date & Time Provider Department Dept. Phone Encounter #  
 7/14/2017  2:40 PM Lavern Verduzco MD [facility] [address] [id number] Your Appointments 7/18/2017 10:45 AM  
ROUTINE CARE with Sammi Mckeon MD  
[facility] ([facility]) Appt Note: 1 week followup [address] [facility] [address] [facility]  
[address] [facility] [address] [facility]  
  
    
 7/21/2017  3:00 PM  
New Patient with Gradie Aase, MD  
[address] [place] [address], [address] [facility]) Appt Note: DELILAH CARPAL TUNNEL SYNDROME REF DR Bronwyn Cuellar INS INS [facility]; DELILAH CARPAL TUNNEL SYNDROME REF DR FERNANDEZ/pt to arrive 10:15 pt aware HS Loc/; DELILAH CARPAL TUNNEL SYNDROME REF DR Bronwyn Cuellar INS IN SYSTEM AT HS OFFICE ARRIVE 30 MINS EARLY  
 [address] [place] [address], [address] [place] [id number] [phone]  
  
   
 [address] [place] [address] [phone] Upcoming Health Maintenance Date Due INFLUENZA AGE 9 TO ADULT 8/1/2017 PAP AKA CERVICAL CYTOLOGY 8/21/2018 DTaP/Tdap/Td series (2 - Td) 10/25/2026 Allergies as of 7/14/2017  Review Complete On: 7/11/2017 By: Sammi Mckeon MD  
  
 Severity Noted Reaction Type Reactions Ace Inhibitors  09/26/2012    Swelling Current Immunizations  Reviewed on 10/25/2016 Name Date Influenza Vaccine (Quad) PF 10/25/2016 Influenza Vaccine PF 12/11/2013 Pneumococcal Polysaccharide (PPSV-23) 10/25/2016 Tdap 10/25/2016 Not reviewed this visit You Were Diagnosed With   
  
 Codes Comments Essential hypertension    -  Primary ICD-10-CM: I10 
ICD-9-CM: 401.9 Pure hypercholesterolemia     ICD-10-CM: E78.00 ICD-9-CM: 272.0 Vitals BP Pulse Height(growth percentile) Weight(growth percentile) SpO2 BMI  
 126/84 74 5' 3\" (1.6 m) 241 lb (109.3 kg) 98% 42.69 kg/m2 OB Status Smoking Status Having regular periods Current Every Day Smoker Vitals History BMI and BSA Data Body Mass Index Body Surface Area  
 42.69 kg/m 2 2.2 m 2 Preferred Pharmacy Pharmacy Name Phone [facility] [address]. [phone] Your Updated Medication List  
  
   
This list is accurate as of: 7/14/17  3:44 PM.  Always use your most recent med list.  
  
  
  
  
 albuterol 90 mcg/actuation inhaler Commonly known as:  Herlene Mitten Take 2 Puffs by inhalation every four (4) hours as needed for Wheezing. amLODIPine 10 mg tablet Commonly known as:  Damien Hamilton Take 1 Tab by mouth daily. atorvastatin 20 mg tablet Commonly known as:  LIPITOR Take 1 Tab by mouth nightly. buPROPion  mg tablet Commonly known as:  [facility] Take 150 mg by mouth every morning. butalbital-acetaminophen-caff -40 mg per capsule Commonly known as:  [employer] Take 1 tablet every 8 hours as needed for headache  
  
 carvedilol 25 mg tablet Commonly known as:  Deleta Briana Take 1 Tab by mouth two (2) times daily (with meals). cloNIDine HCl 0.1 mg tablet Commonly known as:  CATAPRES Take 1 Tab by mouth two (2) times a day. diclofenac EC 75 mg EC tablet Commonly known as:  VOLTAREN Take 1 Tab by mouth two (2) times daily as needed. ferrous sulfate 325 mg (65 mg iron) tablet TAKE ONE TABLET BY MOUTH ONCE DAILY BEFORE BREAKFAST  
  
 gabapentin 400 mg capsule Commonly known as:  NEURONTIN  
1 three times a day GARLIC PO Take  by mouth. MAGNESIUM-CALCIUM-FOLIC ACID PO Take  by mouth. sertraline 50 mg tablet Commonly known as:  ZOLOFT Take 75 mg by mouth nightly. VITAMIN D3 2,000 unit Tab Generic drug:  cholecalciferol (vitamin D3) Take  by mouth. We Performed the Following AMB POC EKG ROUTINE W/ 12 LEADS, INTER & REP [20427 CPT(R)] To-Do List   
 07/14/2017 Lab:  CBC WITH AUTOMATED DIFF   
  
 07/14/2017 Lab:  METABOLIC PANEL, COMPREHENSIVE   
  
 07/14/2017 Lab:  PROTHROMBIN TIME + INR Patient Instructions Instructions Patients Name:  Haseeb Prado 1. You are scheduled to have a Catherization on July 20  at 1030 Please check in at 0930  . 2. Please go to [facility] and park in the outpatient parking lot that is located around to the back of the hospital and enter through the [facility] building. Once you enter through the [facility] check in with the  there. The  will either give you directions or assist you in getting to the cath holding area. 3. You are not to eat anything after midnight the morning of the procedure. Please continue to drink fluids up until 0730. (water, juice, black coffee, soft drinks). Do not drink milk or milk products or anything with cream. 
  
4. MEDICATION INSTRUCTIONS:   Please take your morning medications with the following special instructions: 
 
          Please make sure to take your Blood pressure medication : Catapres, Norvasc(if you take it in the morning) 5. We encourage families to wait in the waiting room on the first floor while the procedure is being done. The Doctor will come out and talk with you as soon as the procedure is over. 6. There is the possibility that you may spend the night in the hospital, depending on the results of the procedure. This will be determined after the procedure is done. If angioplasty or stent is planned, you will stay at least one day. 7. If you or your family have any questions, please call our office Monday Friday, 9:00 a. m.4:30 p.m.,  At [phone], and ask to speak to one of the nurses. Introducing [facility]! Dear Rand Robles: Thank you for requesting a [employer] account. Our records indicate that you already have an active [employer] account. You can access your account anytime at [email or web address] Did you know that you can access your hospital and ER discharge instructions at any time in [employer]? You can also review all of your test results from your hospital stay or ER visit. Additional Information If you have questions, please visit the Frequently Asked Questions section of the [employer] website at [email or web address] Remember, [employer] is NOT to be used for urgent needs. For medical emergencies, dial 911. Now available from your iPhone and Android! Please provide this summary of care documentation to your next provider. Your primary care clinician is listed as Charline Yee. If you have any questions after today's visit, please call [phone].

## 2017-07-14 NOTE — TELEPHONE ENCOUNTER
Cath scheduled for for 7/20 @ 10:30am with Dr. Gael Velazquez. Patient has GAP only Medicaid which will not cover procedure. She will be considered self-pay. I have tried to call patient x2 on both numbers listed in her chart to make her aware of this. .. Voicemail was full on both numbers so unable to leave a message.

## 2017-07-14 NOTE — PROGRESS NOTES
1. Have you been to the ER, urgent care clinic since your last visit? Hospitalized since your last visit? No     2. Have you seen or consulted any other health care providers outside of the [address] [place] [address] since your last visit? Include any pap smears or colon screening.  No

## 2017-07-14 NOTE — PROGRESS NOTES
PATIENT NAME: Ezra Manzano         38 y.o.      1975              DATE:7/14/2017    REASON FOR VISIT: Chest pain. Hypertension    HISTORY OF PRESENT ILLNESS: Has experienced 2 episodes of chest discomfort recently. Both of these occurred at rest.  One was a left precordial discomfort that persisted for hours on accompanied by dyspnea or diaphoresis. The second one was a substernal aching discomfort that also occurred at rest and also was not accompanied by dyspnea or diaphoresis. This persisted for about 30 minutes to an hour. Denies exertional discomfort. Denies dyspnea on exertion, orthopnea, paroxysmal nocturnal dyspnea. Denies palpitation, syncope, presyncope. When she was first seen here, her blood pressure was poorly controlled and she was experiencing exertional anterior cervical discomfort relieved by rest.  She had a Cardiolite scan that was read as showing a fixed apical defect of moderate size that was possibly consistent with an area of old infarction. The final impression was \"moderate risk for ischemia\". Her EKGs have shown a possible old anteroseptal myocardial infarction. Her blood pressure subsequently came under control after her medications were adjusted and the exertional cervical discomfort resolved. PAST MEDICAL HISTORY:   Past Medical History:  No date: Abnormal uterine bleeding (AUB)  No date: Anxiety and depression  No date: Asthma  01/27/2017: Cardiac echocardiogram      Comment: EF 55-60%. No WMA. Mod conc LVH. Gr 2 DDfx. Mild AI.    01/27/2017: Cardiac nuclear imaging test      Comment: Mod risk. Mod area of anterior apical                ischemia likely. No infarction. EF 48%. Mild               anterior apical hypk.   Equivocal EKG on pharm                stress test.  No date: GERD (gastroesophageal reflux disease)  No date: History of blood transfusion  1/2014: HSV-2 (herpes simplex virus 2) infection      Comment: CSF 04/25/2016: Hx of colonoscopy      Comment: internal hemorrhoids, no polyp dr Evelyn Almodovar. repeat               in 10 years. 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 pylori test      Comment: placed on antibiotics and H2 blocker 6/11/14  No date: PPD positive, treated  11/17/2016: Renal duplex      Comment: No significant RA stenosis. No date: Tuberculosis      Comment: denies, 4/14/16 6/9/14: Vitamin D deficiency    PAST SURGICAL HISTORY:   Past Surgical History:  No date: ENDOMETRIAL CRYOABLATION  04/25/2016: HX COLONOSCOPY      Comment: DR. ESTRADA REPEAT IN 2026 (10 YEARS)  1981: HX HERNIA REPAIR      Comment: umbilical      SOCIAL HISTORY:  Social History    Marital status:              Spouse name:                       Years of education:                 Number of children:               Social History Main Topics    Smoking status: Current Every Day Smoker                                                     Packs/day: 0.50      Years: 0.50        Smokeless status: Never Used                        Alcohol use: Yes           1.2 oz/week       2 Cans of beer per week       Comment: monthly 1 or 2    Drug use:  Yes                Special: Marijuana, Cocaine       Comment: Cocaine stopped 2008, Marijuana 4/2/16                 usually once a month or two    Sexual activity: Not Currently     Partners with: Female, Male       Birth control/protection: None    Other Topics            Concern   Service        Yes    Comment:[id number]  Blood Transfusions      Yes Comment:2012  Caffeine Concern        No  Occupational Exposure   No  Hobby Hazards           No  Sleep Concern           No  Stress Concern          Yes  Weight Concern          No  Special Diet            No  Back Care               No  Exercise                No  Bike Helmet             No  Seat Belt               No  Self-Exams              No        ALLERGIES:    -- Ace Inhibitors -- Swelling     CURRENT MEDICATIONS:   Current Outpatient Prescriptions:  cholecalciferol, vitamin D3, (VITAMIN D3) 2,000 unit tab, Take  by mouth. CALCIUM CARB/MAG CARB/FOLIC AC (MAGNESIUM-CALCIUM-FOLIC ACID PO), Take  by mouth.  gabapentin (NEURONTIN) 400 mg capsule, 1 three times a day  amLODIPine (NORVASC) 10 mg tablet, Take 1 Tab by mouth daily. atorvastatin (LIPITOR) 20 mg tablet, Take 1 Tab by mouth nightly. diclofenac EC (VOLTAREN) 75 mg EC tablet, Take 1 Tab by mouth two (2) times daily as needed. ferrous sulfate 325 mg (65 mg iron) tablet, TAKE ONE TABLET BY MOUTH ONCE DAILY BEFORE BREAKFAST  GARLIC PO, Take  by mouth.  cloNIDine HCl (CATAPRES) 0.1 mg tablet, Take 1 Tab by mouth two (2) times a day. carvedilol (COREG) 25 mg tablet, Take 1 Tab by mouth two (2) times daily (with meals). sertraline (ZOLOFT) 50 mg tablet, Take 75 mg by mouth nightly. buPROPion XL (WELLBUTRIN XL) 150 mg tablet, Take 150 mg by mouth every morning. Butalbital-Acetaminophen-Caff (FIORICET) -40 mg cap, Take 1 tablet every 8 hours as needed for headache  albuterol (PROVENTIL, VENTOLIN) 90 mcg/actuation inhaler, Take 2 Puffs by inhalation every four (4) hours as needed for Wheezing. No current facility-administered medications for this visit.        REVIEW of SYSTEMS:History obtained from chart review and the patient  General ROS: negative for - weight gain or weight loss  Hematological and Lymphatic ROS: negative for - bleeding problems  Respiratory ROS: no cough, shortness of breath, or wheezing  Cardiovascular ROS: See history of present illness  Neurological ROS: no TIA or stroke symptoms     PHYSICAL EXAMINATION:   /84  Pulse 74  Ht 5' 3\" (1.6 m)  Wt 109.3 kg (241 lb)  SpO2 98%  BMI 42.69 kg/m2  BP Readings from Last 3 Encounters:  07/14/17 : 126/84  07/11/17 : 136/84  05/31/17 : 118/80    Pulse Readings from Last 3 Encounters:  07/14/17 : 74  07/11/17 : 74  05/31/17 : 81    Wt Readings from Last 3 Encounters:  07/14/17 : 109.3 kg (241 lb)  07/11/17 : 109.3 kg (241 lb)  05/31/17 : 108.9 kg (240 lb)    General: Obese -American female in no apparent distress. HEENT: Sclera clear. Mucous membranes pink and moist.  Neck: No jugular venous distention. Carotid upstrokes 2+ without bruits. Chest: Clear to  auscultation. Heart: PMI not palpable. Regular rhythm. No murmur or gallop. Abdomen: Obese. Unable to palpate abdominal aorta. Extremities: No edema. Skin: Warm and dry. No stasis changes. Neuro: Alert, oriented, speech WNL, no facial asymmetry. Gait WNL. EKG: Possible anteroseptal myocardial infarction age-indeterminate    IMPRESSION:   Chest pain, atypical for angina pectoris, but cannot rule this possibility out entirely. Abnormal Cardiolite scan  Abnormal EKG consistent with possible prior anteroseptal myocardial infarction  Hypertension controlled  The anterior cervical discomfort that she was experiencing prior to the control of her blood pressure was suspicious for angina pectoris. PLAN:  I think that given the abnormalities on EKG and Cardiolite scan left heart catheterization with coronary arteriography would be the prudent thing to do. I have discussed this with her. She agrees    The diagnoses and plan were discussed with patient. All questions answered. Plan of care agreed to by all concerned. Davi Le.  MD Raisa       ,

## 2017-07-14 NOTE — PATIENT INSTRUCTIONS
Instructions    Patients Name:  Angelia Nevarez    1. You are scheduled to have a Catherization on July 20  at 1030 Please check in at 0930  .     2. Please go to [facility] and park in the outpatient parking lot that is located around to the back of the hospital and enter through the [facility]. Once you enter through the [facility] check in with the  there. The  will either give you directions or assist you in getting to the cath holding area. 3. You are not to eat anything after midnight the morning of the procedure. Please continue to drink fluids up until 0730. (water, juice, black coffee, soft drinks). Do not drink milk or milk products or anything with cream.     4. MEDICATION INSTRUCTIONS:   Please take your morning medications with the following special instructions:    [x]          Please make sure to take your Blood pressure medication : Catapres, Norvasc(if you take it in the morning)      5. We encourage families to wait in the waiting room on the first floor while the procedure is being done. The Doctor will come out and talk with you as soon as the procedure is over. 6. There is the possibility that you may spend the night in the hospital, depending on the results of the procedure. This will be determined after the procedure is done. If angioplasty or stent is planned, you will stay at least one day. 7. If you or your family have any questions, please call our office Monday -Friday, 9:00 a.m.-4:30 p.m.,  At [phone], and ask to speak to one of the nurses.

## 2017-07-17 NOTE — ADDENDUM NOTE
Addended by: [facility] on: 7/17/2017 08:25 AM     Modules accepted: Orders

## 2017-07-18 ENCOUNTER — OFFICE VISIT (OUTPATIENT)
Dept: FAMILY MEDICINE CLINIC | Age: 42
End: 2017-07-18

## 2017-07-18 VITALS
DIASTOLIC BLOOD PRESSURE: 82 MMHG | HEIGHT: 63 IN | HEART RATE: 80 BPM | BODY MASS INDEX: 42.88 KG/M2 | TEMPERATURE: 98.4 F | WEIGHT: 242 LBS | OXYGEN SATURATION: 98 % | SYSTOLIC BLOOD PRESSURE: 134 MMHG | RESPIRATION RATE: 16 BRPM

## 2017-07-18 DIAGNOSIS — E78.00 PURE HYPERCHOLESTEROLEMIA: ICD-10-CM

## 2017-07-18 DIAGNOSIS — R73.09 ELEVATED GLUCOSE: Primary | ICD-10-CM

## 2017-07-18 DIAGNOSIS — I10 ESSENTIAL HYPERTENSION: ICD-10-CM

## 2017-07-18 DIAGNOSIS — D50.0 IRON DEFICIENCY ANEMIA DUE TO CHRONIC BLOOD LOSS: ICD-10-CM

## 2017-07-18 DIAGNOSIS — R73.09 ELEVATED HEMOGLOBIN A1C: ICD-10-CM

## 2017-07-18 DIAGNOSIS — Z72.0 TOBACCO USE: ICD-10-CM

## 2017-07-18 DIAGNOSIS — E55.9 VITAMIN D DEFICIENCY: ICD-10-CM

## 2017-07-18 LAB
ALBUMIN UR QL STRIP: 30 MG/L
CREATININE, URINE POC: 300 MG/DL
MICROALBUMIN/CREAT RATIO POC: <30 MG/G

## 2017-07-18 RX ORDER — ATORVASTATIN CALCIUM 80 MG/1
80 TABLET, FILM COATED ORAL DAILY
Qty: 90 TAB | Refills: 0 | Status: SHIPPED | OUTPATIENT
Start: 2017-07-18 | End: 2017-08-03 | Stop reason: SDUPTHER

## 2017-07-18 RX ORDER — INSULIN PUMP SYRINGE, 3 ML
EACH MISCELLANEOUS
Qty: 1 KIT | Refills: 0 | Status: SHIPPED | OUTPATIENT
Start: 2017-07-18

## 2017-07-18 RX ORDER — LANCETS
EACH MISCELLANEOUS
Qty: 1 EACH | Refills: 11 | Status: SHIPPED | OUTPATIENT
Start: 2017-07-18

## 2017-07-18 RX ORDER — BUTALBITAL, ACETAMINOPHEN AND CAFFEINE 300; 40; 50 MG/1; MG/1; MG/1
CAPSULE ORAL
Qty: 12 CAP | Refills: 0 | Status: SHIPPED | OUTPATIENT
Start: 2017-07-18 | End: 2018-08-28 | Stop reason: SDUPTHER

## 2017-07-18 RX ORDER — METFORMIN HYDROCHLORIDE 500 MG/1
500 TABLET ORAL 2 TIMES DAILY WITH MEALS
Qty: 120 TAB | Refills: 0 | Status: SHIPPED | OUTPATIENT
Start: 2017-07-18 | End: 2017-08-03 | Stop reason: DRUGHIGH

## 2017-07-18 NOTE — PATIENT INSTRUCTIONS

## 2017-07-18 NOTE — PROGRESS NOTES
Chief Complaint   Patient presents with    Hypertension    Cholesterol Problem    Anemia    Results     New onset DM     1. Have you been to the ER, urgent care clinic since your last visit? Hospitalized since your last visit? No    2. Have you seen or consulted any other health care providers outside of the [address] [place] [address] since your last visit? Include any pap smears or colon screening.  No

## 2017-07-18 NOTE — PROGRESS NOTES
Alea HayesGradyHuitron, 38 y.o.,  female    SUBJECTIVE   ff-up    HTN-taking medications without problems. She continues to smoke. Reviewed dr mckayla an,evaluating chest pain, recommending cardiac cath at this point. HL- says taking lipitor. Reviewed labs, elevated a1c/fbg, has been prediabetic for years, h/o gestational diabetes at age 23. Her diet is poor, sugary drinks, high carb. anemia-  H/o  chronic menorrhagia has yet to see dr. Whitney Joseph, there was some discussion on endometrial ablation after D and C previously. she does feel tired, and on oral iron replacement for several years. Denies melena, constipation. Has had colonoscopy 4/2016 showing internal hemorrhoids, normal EGD/no polyp (Dr. Archana Salcedo). She reports unable to ff-up with hematologist dr. Savannah De La Paz due to insurance coverage. She says she is unable to see gyne due to cost.     Currently seeing dr. Erica Kaye for bilateral hand pain, EMG reviewed c/w carpal tunnel. Says saw psychologist in [address], and was dx with bipolar disorder, previously was anxiety/depression. She does not agree with diagnosis and hence did not ff-up. Requesting refill on fioricet. Says has had tension headache in the past, and this medication was effective. Reports stress at work contributing. Says same presentation, generalized throbbing headache. No aura. ROS:  See HPI, all others negative        Patient Active Problem List   Diagnosis Code    HTN (hypertension) I10    Gastroesophageal reflux disease K21.9    Anxiety and depression F41.9, F32.9    Positive H. pylori test A04.8    Iron deficiency anemia D50.9    Vitamin D deficiency E55.9    Tobacco use Z72.0    Bilateral carpal tunnel syndrome G56.03    Pure hypercholesterolemia E78.00       Current Outpatient Prescriptions   Medication Sig Dispense Refill    metFORMIN (GLUCOPHAGE) 500 mg tablet Take 1 Tab by mouth two (2) times daily (with meals).  120 Tab 0    Blood-Glucose Meter (FREESTYLE LITE METER) monitoring kit Check fasting glucose once daily 1 Kit 0    glucose blood VI test strips (FREESTYLE LITE STRIPS) strip Check fasting glucose once daily 100 Strip 2    Lancets misc Check fasting glucose once daily 1 Each 11    butalbital-acetaminophen-caff (FIORICET) -40 mg per capsule Take 1 tablet every 8 hours as needed for headache 12 Cap 0    atorvastatin (LIPITOR) 80 mg tablet Take 1 Tab by mouth daily. 90 Tab 0    cholecalciferol, vitamin D3, (VITAMIN D3) 2,000 unit tab Take  by mouth.  CALCIUM CARB/MAG CARB/FOLIC AC (MAGNESIUM-CALCIUM-FOLIC ACID PO) Take  by mouth.  gabapentin (NEURONTIN) 400 mg capsule 1 three times a day 90 Cap 3    amLODIPine (NORVASC) 10 mg tablet Take 1 Tab by mouth daily. 30 Tab 11    diclofenac EC (VOLTAREN) 75 mg EC tablet Take 1 Tab by mouth two (2) times daily as needed. 60 Tab 5    ferrous sulfate 325 mg (65 mg iron) tablet TAKE ONE TABLET BY MOUTH ONCE DAILY BEFORE BREAKFAST 90 Tab 1    GARLIC PO Take  by mouth.  cloNIDine HCl (CATAPRES) 0.1 mg tablet Take 1 Tab by mouth two (2) times a day. 60 Tab 11    carvedilol (COREG) 25 mg tablet Take 1 Tab by mouth two (2) times daily (with meals). 60 Tab 11    sertraline (ZOLOFT) 50 mg tablet Take 75 mg by mouth nightly.  buPROPion XL (WELLBUTRIN XL) 150 mg tablet Take 150 mg by mouth every morning.  albuterol (PROVENTIL, VENTOLIN) 90 mcg/actuation inhaler Take 2 Puffs by inhalation every four (4) hours as needed for Wheezing. 17 g 0       Allergies   Allergen Reactions    Ace Inhibitors Swelling       Past Medical History:   Diagnosis Date    Abnormal uterine bleeding (AUB)     Anxiety and depression     Asthma     Cardiac echocardiogram 01/27/2017    EF 55-60%. No WMA. Mod conc LVH. Gr 2 DDfx. Mild AI.  Cardiac nuclear imaging test 01/27/2017    Mod risk. Mod area of anterior apical ischemia likely. No infarction. EF 48%. Mild anterior apical hypk.   Equivocal EKG on pharm stress test.   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 repeat in 10 years. 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 pylori test 6/9/14    placed on antibiotics and H2 blocker 6/11/14    PPD positive, treated     Renal duplex 11/17/2016    No significant RA stenosis.  Tuberculosis     denies, 4/14/16    Vitamin D deficiency 6/9/14       Social History     Social History    Marital status:      Spouse name: N/A    Number of children: N/A    Years of education: N/A     Occupational History    Not on file.      Social History Main Topics    Smoking status: Current Every Day Smoker     Packs/day: 0.50     Years: 0.50    Smokeless tobacco: Never Used    Alcohol use 1.2 oz/week     2 Cans of beer per week      Comment: monthly 1 or 2    Drug use: Yes     Special: Marijuana, Cocaine      Comment: Cocaine stopped 2008, Marijuana 4/2/16 usually once a month or two    Sexual activity: Not Currently     Partners: Female, Male     Birth control/ protection: None     Other Topics Concern     Service Yes     [id number]    Blood Transfusions Yes     2012    Caffeine Concern No    Occupational Exposure No    Hobby Hazards No    Sleep Concern No    Stress Concern Yes    Weight Concern No    Special Diet No    Back Care No    Exercise No    Bike Helmet No    Seat Belt No    Self-Exams No     Social History Narrative       Family History   Problem Relation Age of Onset    Hypertension Mother     Diabetes Mother     Depression Mother     Substance Abuse Mother      Tobacco use    Migraines Mother     High Cholesterol Mother     Heart Attack Mother     Heart Failure Mother     Eczema Mother     Arthritis-osteo Mother     Hypertension Father     Diabetes Father     Substance Abuse Father      Tobacco use and drug abuse    Stroke Paternal Aunt     Heart Disease Maternal Grandfather     Hypertension Maternal Grandfather     High Cholesterol Maternal Grandfather     Diabetes Maternal Grandfather     Stroke Maternal Grandfather     Heart Attack Maternal Grandfather     Heart Failure Maternal Grandfather     Alcohol abuse Brother      Drug/Alcohol abuse    Substance Abuse Maternal Grandmother      Tobacco use - MGM, MGF, and brother,    Diabetes Maternal Grandmother     Hypertension Brother     High Cholesterol Brother     Diabetes Sister     Diabetes Paternal Grandmother     Heart Attack Brother     Heart Failure Brother          OBJECTIVE    Physical Exam:     Visit Vitals    BP (!) 164/100 (BP 1 Location: Left arm, BP Patient Position: Sitting)  Comment: no meds since 10pm last night    Pulse 80    Temp 98.4 °F (36.9 °C) (Oral)    Resp 16    Ht 5' 3\" (1.6 m)    Wt 242 lb (109.8 kg)    SpO2 98%    BMI 42.87 kg/m2       General: alert, obese, AA, in no apparent distress or pain  Neck: supple, no adenopathy palpated  CVS: normal rate, regular rhythm, distinct S1 and S2  Lungs:clear to ausculation bilaterally, no crackles, wheezing or rhonchi noted  Extremities: no edema, no cyanosis, feet no lesions normal monofilament.    Skin: warm, no lesions, rashes noted  Psych:  mood and affect normal    Results for orders placed or performed during the hospital encounter of 07/14/17   PROTHROMBIN TIME + INR   Result Value Ref Range    Prothrombin time 13.0 11.5 - 15.2 sec    INR 1.0 0.8 - 1.2     CBC WITH AUTOMATED DIFF   Result Value Ref Range    WBC 5.0 4.6 - 13.2 K/uL    RBC 3.71 (L) 4.20 - 5.30 M/uL    HGB 10.9 (L) 12.0 - 16.0 g/dL    HCT 33.7 (L) 35.0 - 45.0 %    MCV 90.8 74.0 - 97.0 FL    MCH 29.4 24.0 - 34.0 PG    MCHC 32.3 31.0 - 37.0 g/dL    RDW 18.9 (H) 11.6 - 14.5 %    PLATELET 701 721 - 034 K/uL    MPV 10.2 9.2 - 11.8 FL    NEUTROPHILS 33 (L) 40 - 73 %    LYMPHOCYTES 54 (H) 21 - 52 %    MONOCYTES 11 (H) 3 - 10 %    EOSINOPHILS 2 0 - 5 %    BASOPHILS 0 0 - 2 %    ABS. NEUTROPHILS 1.6 (L) 1.8 - 8.0 K/UL    ABS. LYMPHOCYTES 2.7 0.9 - 3.6 K/UL    ABS. MONOCYTES 0.5 0.05 - 1.2 K/UL    ABS. EOSINOPHILS 0.1 0.0 - 0.4 K/UL    ABS. BASOPHILS 0.0 0.0 - 0.1 K/UL    DF AUTOMATED     METABOLIC PANEL, COMPREHENSIVE   Result Value Ref Range    Sodium 140 136 - 145 mmol/L    Potassium 3.8 3.5 - 5.5 mmol/L    Chloride 105 100 - 108 mmol/L    CO2 28 21 - 32 mmol/L    Anion gap 7 3.0 - 18 mmol/L    Glucose 150 (H) 74 - 99 mg/dL    BUN 15 7.0 - 18 MG/DL    Creatinine 0.78 0.6 - 1.3 MG/DL    BUN/Creatinine ratio 19 12 - 20      GFR est AA >60 >60 ml/min/1.73m2    GFR est non-AA >60 >60 ml/min/1.73m2    Calcium 8.9 8.5 - 10.1 MG/DL    Bilirubin, total 0.6 0.2 - 1.0 MG/DL    ALT (SGPT) 24 13 - 56 U/L    AST (SGOT) 13 (L) 15 - 37 U/L    Alk. phosphatase 64 45 - 117 U/L    Protein, total 7.6 6.4 - 8.2 g/dL    Albumin 4.0 3.4 - 5.0 g/dL    Globulin 3.6 2.0 - 4.0 g/dL    A-G Ratio 1.1 0.8 - 1.7       Results for orders placed or performed in visit on 07/18/17   AMB POC URINE, MICROALBUMIN, SEMIQUANT (3 RESULTS)   Result Value Ref Range    ALBUMIN, URINE POC 30 Negative mg/L    CREATININE, URINE  mg/dL    Microalbumin/creat ratio (POC) <30 MG/G         ASSESSMENT/PLAN  Alea was seen today for hand pain.     Diagnoses and all orders for this visit:    Essential hypertension  Controlled, cont current regimen  on clonidine/coreg, norvasc, chlorthalidone  Counseled on smoking cessation    Iron deficiency anemia due to chronic blood loss   likely due to menorrhagia, mild hgb 10.8  Pt says cannot see gyne at this time due to insurance issues. there was previous discussion on possible ablation. She has had D and Cs. Cont po fe at this time, take with vit c.   Colonoscopy/egd 4/2016 normal, internal hemorrhoids noted. .   Cont po fe supplementation      Vitamin D deficiency  Improved, to take 1000 iu daily. Elevated a1c  8.5, fbg 157, will confirm  Will start metformin, slow 500 mg OD then bid, then 1000 mg bid, weekly intervals  Refer to dietician  Provided glucometer  NN your to assist with education/resources  ADA diet discussed, sample menus/carb counting  Eye exam-referral placed  Foot exam- 7/17  Microalbumin-7/17  Lipid panel -increase lipitor to 80 mg qhs, recheck lipids/cmp/a1c  a1c -7/17    BMI 40.0-44.9, adult (HCC)    Anxiety and depression  Encouraged ff-up appt with CPA for further evaluation, pt is agreeable, and would schedule herself    Pure hypercholesterolemia-  Increase lipitor dose to 80 mg qhs    Tobacco use  Counseled on cessation    Allergic rhinitis  Cont  clarinex    Tension headache  Symptomatic, refilled fioricet    Follow-up Disposition:  Return in about 2 weeks (around 8/1/2017), or if symptoms worsen or fail to improve. Patient understands plan of care. Patient has provided input and agrees with goals.

## 2017-07-18 NOTE — MR AVS SNAPSHOT
Visit Information Date & Time Provider Department Dept. Phone Encounter #  
 7/18/2017  4:15 PM Haider You, [address] [id number] Follow-up Instructions Return in about 2 weeks (around 8/1/2017), or if symptoms worsen or fail to improve. Your Appointments 7/21/2017  3:00 PM  
New Patient with Keke Waldron MD  
[address] [facility], [address] and [facility]) Appt Note: DELILAH CARPAL TUNNEL SYNDROME REF DR Lis Marinelli INS INS SYTEM; DELILAH CARPAL TUNNEL SYNDROME REF DR FERNANDEZ/pt to arrive 10:15 pt aware HS Loc/; DELILAH CARPAL TUNNEL SYNDROME REF DR Lis Marinelli INS IN SYSTEM AT HS OFFICE ARRIVE 30 MINS EARLY  
 [address] [facility], [address] [facility] [address]  
[phone]  
  
   
 [address] [place] [address], [address] [place], [place] [address]ida [facility] [address] Upcoming Health Maintenance Date Due INFLUENZA AGE 9 TO ADULT 8/1/2017 PAP AKA CERVICAL CYTOLOGY 8/21/2018 DTaP/Tdap/Td series (2 - Td) 10/25/2026 Allergies as of 7/18/2017  Review Complete On: 7/18/2017 By: Zoë Fischer LPN Severity Noted Reaction Type Reactions Ace Inhibitors  09/26/2012    Swelling Current Immunizations  Reviewed on 10/25/2016 Name Date Influenza Vaccine (Quad) PF 10/25/2016 Influenza Vaccine PF 12/11/2013 Pneumococcal Polysaccharide (PPSV-23) 10/25/2016 Tdap 10/25/2016 Not reviewed this visit You Were Diagnosed With   
  
 Codes Comments Elevated glucose    -  Primary ICD-10-CM: R73.09 
ICD-9-CM: 790.29 Elevated hemoglobin A1c     ICD-10-CM: R73.09 
ICD-9-CM: 790.29 Tobacco use     ICD-10-CM: Z72.0 ICD-9-CM: 305.1 Pure hypercholesterolemia     ICD-10-CM: E78.00 ICD-9-CM: 272.0 Iron deficiency anemia due to chronic blood loss     ICD-10-CM: D50.0 ICD-9-CM: 280.0 Vitamin D deficiency     ICD-10-CM: E55.9 ICD-9-CM: 268.9 Essential hypertension     ICD-10-CM: I10 
ICD-9-CM: 401.9 Vitals BP Pulse Temp Resp Height(growth percentile) Weight(growth percentile) (!) 164/100 (BP 1 Location: Left arm, BP Patient Position: Sitting) 80 98.4 °F (36.9 °C) (Oral) 16 5' 3\" (1.6 m) 242 lb (109.8 kg) SpO2 BMI OB Status Smoking Status 98% 42.87 kg/m2 Having regular periods Current Every Day Smoker Vitals History BMI and BSA Data Body Mass Index Body Surface Area  
 42.87 kg/m 2 2.21 m 2 Preferred Pharmacy Pharmacy Name Phone [facility] Brenda Lara [address]. [phone] Your Updated Medication List  
  
   
This list is accurate as of: 7/18/17  4:25 PM.  Always use your most recent med list.  
  
  
  
  
 albuterol 90 mcg/actuation inhaler Commonly known as:  Marian Fare Take 2 Puffs by inhalation every four (4) hours as needed for Wheezing. amLODIPine 10 mg tablet Commonly known as:  Hamilton Iraj Take 1 Tab by mouth daily. atorvastatin 80 mg tablet Commonly known as:  LIPITOR Take 1 Tab by mouth daily. Blood-Glucose Meter monitoring kit Commonly known as:  FREESTYLE LITE METER Check fasting glucose once daily buPROPion  mg tablet Commonly known as:  Nael Munch Take 150 mg by mouth every morning. butalbital-acetaminophen-caff -40 mg per capsule Commonly known as:  [employer] Take 1 tablet every 8 hours as needed for headache  
  
 carvedilol 25 mg tablet Commonly known as:  Susan Menghini Take 1 Tab by mouth two (2) times daily (with meals). cloNIDine HCl 0.1 mg tablet Commonly known as:  CATAPRES Take 1 Tab by mouth two (2) times a day. diclofenac EC 75 mg EC tablet Commonly known as:  VOLTAREN Take 1 Tab by mouth two (2) times daily as needed. ferrous sulfate 325 mg (65 mg iron) tablet TAKE ONE TABLET BY MOUTH ONCE DAILY BEFORE BREAKFAST  
  
 gabapentin 400 mg capsule Commonly known as:  NEURONTIN  
1 three times a day GARLIC PO Take  by mouth. glucose blood VI test strips strip Commonly known as:  FREESTYLE LITE STRIPS Check fasting glucose once daily Lancets Misc Check fasting glucose once daily MAGNESIUM-CALCIUM-FOLIC ACID PO Take  by mouth.  
  
 metFORMIN 500 mg tablet Commonly known as:  GLUCOPHAGE Take 1 Tab by mouth two (2) times daily (with meals). sertraline 50 mg tablet Commonly known as:  ZOLOFT Take 75 mg by mouth nightly. VITAMIN D3 2,000 unit Tab Generic drug:  cholecalciferol (vitamin D3) Take  by mouth. Prescriptions Printed Refills  
 butalbital-acetaminophen-caff (FIORICET) -40 mg per capsule 0 Sig: Take 1 tablet every 8 hours as needed for headache Class: Print Prescriptions Sent to Pharmacy Refills  
 metFORMIN (GLUCOPHAGE) 500 mg tablet 0 Sig: Take 1 Tab by mouth two (2) times daily (with meals). Class: Normal  
 Pharmacy: [address] [place] [address]. [address], [facility] Ph #: [phone] Route: Oral  
 Blood-Glucose Meter (FREESTYLE LITE METER) monitoring kit 0 Sig: Check fasting glucose once daily Class: Normal  
 Pharmacy: [address] [place] [address]. [address], [facility]. Ph #: [phone]  
 glucose blood VI test strips (FREESTYLE LITE STRIPS) strip 2 Sig: Check fasting glucose once daily Class: Normal  
 Pharmacy: [address] [place] [address]. [address], [facility] Ph #: [phone] Lancets misc 11 Sig: Check fasting glucose once daily Class: Normal  
 Pharmacy: [address] [place] [address]. [address], [address] Ph #: [phone]  
 atorvastatin (LIPITOR) 80 mg tablet 0 Sig: Take 1 Tab by mouth daily. Class: Normal  
 Pharmacy: [address] [place] [address]. [address], [address] Ph #: [phone] Route: Oral  
  
We Performed the Following AMB POC URINE, MICROALBUMIN, SEMIQUANT (3 RESULTS) [84245 CPT(R)] HM DIABETES FOOT EXAM [HM7 Custom] REFERRAL TO NUTRITION [REF50 Custom] Comments:  
 Please evaluate patient for new onset diabetes REFERRAL TO OPHTHALMOLOGY [REF57 Custom] Follow-up Instructions Return in about 2 weeks (around 8/1/2017), or if symptoms worsen or fail to improve. To-Do List   
 07/18/2017 Lab:  HEMOGLOBIN A1C W/O EAG   
  
 07/18/2017 Lab:  LIPID PANEL   
  
 07/18/2017 Lab:  METABOLIC PANEL, BASIC   
  
 07/20/2017 10:30 AM  
  Appointment with [facility] CAD NO ANESTHESIA; [facility] 1 CATH LAB; [facility] CATH HOLDING at [address] [facility] LAB ([phone]) Referral Information Referral ID Referred By Referred To  
  
 [id number] Ned Lenz MD   
   [address] [place] [address], [address] Phone: [phone] Fax: [phone] Visits Status Start Date End Date 1 New Request 7/18/17 7/18/18 If your referral has a status of pending review or denied, additional information will be sent to support the outcome of this decision. Referral ID Referred By Referred To  
 [id number] Fransico Essex P Not Available Visits Status Start Date End Date 1 New Request 7/18/17 7/18/18 If your referral has a status of pending review or denied, additional information will be sent to support the outcome of this decision. Patient Instructions Learning About Diabetes Food Guidelines Your Care Instructions Meal planning is important to manage diabetes. It helps keep your blood sugar at a target level (which you set with your doctor). You don't have to eat special foods. You can eat what your family eats, including sweets once in a while. But you do have to pay attention to how often you eat and how much you eat of certain foods.  
You may want to work with a dietitian or a certified diabetes educator (CDE) to help you plan meals and snacks. A dietitian or CDE can also help you lose weight if that is one of your goals. What should you know about eating carbs? Managing the amount of carbohydrate (carbs) you eat is an important part of healthy meals when you have diabetes. Carbohydrate is found in many foods. · Learn which foods have carbs. And learn the amounts of carbs in different foods. ¨ Bread, cereal, pasta, and rice have about 15 grams of carbs in a serving. A serving is 1 slice of bread (1 ounce), ½ cup of cooked cereal, or 1/3 cup of cooked pasta or rice. ¨ Fruits have 15 grams of carbs in a serving. A serving is 1 small fresh fruit, such as an apple or orange; ½ of a banana; ½ cup of cooked or canned fruit; ½ cup of fruit juice; 1 cup of melon or raspberries; or 2 tablespoons of dried fruit. ¨ Milk and no-sugar-added yogurt have 15 grams of carbs in a serving. A serving is 1 cup of milk or 2/3 cup of no-sugar-added yogurt. ¨ Starchy vegetables have 15 grams of carbs in a serving. A serving is ½ cup of mashed potatoes or sweet potato; 1 cup winter squash; ½ of a small baked potato; ½ cup of cooked beans; or ½ cup cooked corn or green peas. · Learn how much carbs to eat each day and at each meal. A dietitian or CDE can teach you how to keep track of the amount of carbs you eat. This is called carbohydrate counting. · If you are not sure how to count carbohydrate grams, use the Plate Method to plan meals. It is a good, quick way to make sure that you have a balanced meal. It also helps you spread carbs throughout the day. ¨ Divide your plate by types of foods. Put non-starchy vegetables on half the plate, meat or other protein food on one-quarter of the plate, and a grain or starchy vegetable in the final quarter of the plate.  To this you can add a small piece of fruit and 1 cup of milk or yogurt, depending on how many carbs you are supposed to eat at a meal. 
 · Try to eat about the same amount of carbs at each meal. Do not \"save up\" your daily allowance of carbs to eat at one meal. 
· Proteins have very little or no carbs per serving. Examples of proteins are beef, chicken, turkey, fish, eggs, tofu, cheese, cottage cheese, and peanut butter. A serving size of meat is 3 ounces, which is about the size of a deck of cards. Examples of meat substitute serving sizes (equal to 1 ounce of meat) are 1/4 cup of cottage cheese, 1 egg, 1 tablespoon of peanut butter, and ½ cup of tofu. How can you eat out and still eat healthy? · Learn to estimate the serving sizes of foods that have carbohydrate. If you measure food at home, it will be easier to estimate the amount in a serving of restaurant food. · If the meal you order has too much carbohydrate (such as potatoes, corn, or baked beans), ask to have a low-carbohydrate food instead. Ask for a salad or green vegetables. · If you use insulin, check your blood sugar before and after eating out to help you plan how much to eat in the future. · If you eat more carbohydrate at a meal than you had planned, take a walk or do other exercise. This will help lower your blood sugar. What else should you know? · Limit saturated fat, such as the fat from meat and dairy products. This is a healthy choice because people who have diabetes are at higher risk of heart disease. So choose lean cuts of meat and nonfat or low-fat dairy products. Use olive or canola oil instead of butter or shortening when cooking. · Don't skip meals. Your blood sugar may drop too low if you skip meals and take insulin or certain medicines for diabetes. · Check with your doctor before you drink alcohol. Alcohol can cause your blood sugar to drop too low. Alcohol can also cause a bad reaction if you take certain diabetes medicines. Follow-up care is a key part of your treatment and safety.  Be sure to make and go to all appointments, and call your doctor if you are having problems. It's also a good idea to know your test results and keep a list of the medicines you take. Where can you learn more? Go to http://juan ramon-chuy.info/. Enter F292 in the search box to learn more about \"Learning About Diabetes Food Guidelines. \" Current as of: March 13, 2017 Content Version: 11.3 © 4065-5319 [employer]. Care instructions adapted under license by [employer] (which disclaims liability or warranty for this information). If you have questions about a medical condition or this instruction, always ask your healthcare professional. [address] any warranty or liability for your use of this information. Introducing [facility] [place] [facility]! Dear Henri Mancuso: Thank you for requesting a [employer] account. Our records indicate that you already have an active [employer] account. You can access your account anytime at [email or web address] Did you know that you can access your hospital and ER discharge instructions at any time in [employer]? You can also review all of your test results from your hospital stay or ER visit. Additional Information If you have questions, please visit the Frequently Asked Questions section of the [employer] website at [email or web address] Remember, [employer] is NOT to be used for urgent needs. For medical emergencies, dial 911. Now available from your iPhone and Android! Please provide this summary of care documentation to your next provider. Your primary care clinician is listed as Princess Cornelius. If you have any questions after today's visit, please call [phone].

## 2017-07-20 ENCOUNTER — HOSPITAL ENCOUNTER (OUTPATIENT)
Dept: CARDIAC CATH/INVASIVE PROCEDURES | Age: 42
Discharge: HOME | End: 2017-07-20
Attending: INTERNAL MEDICINE | Admitting: INTERNAL MEDICINE
Payer: MEDICAID

## 2017-07-20 VITALS
OXYGEN SATURATION: 99 % | WEIGHT: 241 LBS | DIASTOLIC BLOOD PRESSURE: 94 MMHG | RESPIRATION RATE: 25 BRPM | HEART RATE: 72 BPM | SYSTOLIC BLOOD PRESSURE: 152 MMHG | HEIGHT: 63 IN | BODY MASS INDEX: 42.7 KG/M2

## 2017-07-20 LAB — GLUCOSE BLD STRIP.AUTO-MCNC: 167 MG/DL (ref 70–110)

## 2017-07-20 PROCEDURE — 74011000250 HC RX REV CODE- 250: Performed by: INTERNAL MEDICINE

## 2017-07-20 PROCEDURE — 82962 GLUCOSE BLOOD TEST: CPT

## 2017-07-20 PROCEDURE — 74011636320 HC RX REV CODE- 636/320: Performed by: INTERNAL MEDICINE

## 2017-07-20 PROCEDURE — 74011250637 HC RX REV CODE- 250/637: Performed by: INTERNAL MEDICINE

## 2017-07-20 PROCEDURE — 77030013797 CARDIAC CATHETERIZATION

## 2017-07-20 PROCEDURE — 74011250636 HC RX REV CODE- 250/636: Performed by: INTERNAL MEDICINE

## 2017-07-20 RX ORDER — SODIUM CHLORIDE 0.9 % (FLUSH) 0.9 %
5-10 SYRINGE (ML) INJECTION EVERY 8 HOURS
Status: DISCONTINUED | OUTPATIENT
Start: 2017-07-20 | End: 2017-07-20 | Stop reason: HOSPADM

## 2017-07-20 RX ORDER — LIDOCAINE HYDROCHLORIDE 10 MG/ML
1-30 INJECTION, SOLUTION EPIDURAL; INFILTRATION; INTRACAUDAL; PERINEURAL
Status: DISCONTINUED | OUTPATIENT
Start: 2017-07-20 | End: 2017-07-20 | Stop reason: HOSPADM

## 2017-07-20 RX ORDER — HEPARIN SODIUM 200 [USP'U]/100ML
500 INJECTION, SOLUTION INTRAVENOUS ONCE
Status: COMPLETED | OUTPATIENT
Start: 2017-07-20 | End: 2017-07-20

## 2017-07-20 RX ORDER — HEPARIN SODIUM 1000 [USP'U]/ML
1000-10000 INJECTION, SOLUTION INTRAVENOUS; SUBCUTANEOUS
Status: DISCONTINUED | OUTPATIENT
Start: 2017-07-20 | End: 2017-07-20 | Stop reason: HOSPADM

## 2017-07-20 RX ORDER — BIVALIRUDIN 250 MG/5ML
0.75 INJECTION, POWDER, LYOPHILIZED, FOR SOLUTION INTRAVENOUS ONCE
Status: DISCONTINUED | OUTPATIENT
Start: 2017-07-20 | End: 2017-07-20 | Stop reason: HOSPADM

## 2017-07-20 RX ORDER — SODIUM CHLORIDE 0.9 % (FLUSH) 0.9 %
5-10 SYRINGE (ML) INJECTION AS NEEDED
Status: DISCONTINUED | OUTPATIENT
Start: 2017-07-20 | End: 2017-07-20 | Stop reason: HOSPADM

## 2017-07-20 RX ORDER — VERAPAMIL HYDROCHLORIDE 2.5 MG/ML
2.5-5 INJECTION, SOLUTION INTRAVENOUS ONCE
Status: COMPLETED | OUTPATIENT
Start: 2017-07-20 | End: 2017-07-20

## 2017-07-20 RX ORDER — MIDAZOLAM HYDROCHLORIDE 1 MG/ML
.5-2 INJECTION, SOLUTION INTRAMUSCULAR; INTRAVENOUS
Status: DISCONTINUED | OUTPATIENT
Start: 2017-07-20 | End: 2017-07-20 | Stop reason: HOSPADM

## 2017-07-20 RX ORDER — GUAIFENESIN 100 MG/5ML
162 LIQUID (ML) ORAL ONCE
Status: COMPLETED | OUTPATIENT
Start: 2017-07-20 | End: 2017-07-20

## 2017-07-20 RX ORDER — FENTANYL CITRATE 50 UG/ML
12.5-1 INJECTION, SOLUTION INTRAMUSCULAR; INTRAVENOUS
Status: DISCONTINUED | OUTPATIENT
Start: 2017-07-20 | End: 2017-07-20 | Stop reason: HOSPADM

## 2017-07-20 RX ADMIN — ASPIRIN 81 MG 162 MG: 81 TABLET ORAL at 10:51

## 2017-07-20 RX ADMIN — HEPARIN SODIUM 2000 UNITS: 1000 INJECTION, SOLUTION INTRAVENOUS; SUBCUTANEOUS at 11:25

## 2017-07-20 RX ADMIN — VERAPAMIL HYDROCHLORIDE 5 MG: 2.5 INJECTION INTRAVENOUS at 11:24

## 2017-07-20 RX ADMIN — FENTANYL CITRATE 25 MCG: 50 INJECTION INTRAMUSCULAR; INTRAVENOUS at 11:00

## 2017-07-20 RX ADMIN — HEPARIN SODIUM 1000 UNITS: 200 INJECTION, SOLUTION INTRAVENOUS at 10:57

## 2017-07-20 RX ADMIN — MIDAZOLAM HYDROCHLORIDE 1 MG: 1 INJECTION, SOLUTION INTRAMUSCULAR; INTRAVENOUS at 11:00

## 2017-07-20 RX ADMIN — LIDOCAINE HYDROCHLORIDE 2 ML: 10 INJECTION, SOLUTION EPIDURAL; INFILTRATION; INTRACAUDAL; PERINEURAL at 11:22

## 2017-07-20 RX ADMIN — IOPAMIDOL 65 ML: 612 INJECTION, SOLUTION INTRAVENOUS at 11:31

## 2017-07-20 RX ADMIN — NITROGLYCERIN 200 MCG: 5 INJECTION, SOLUTION INTRAVENOUS at 11:24

## 2017-07-20 NOTE — H&P
Addendum:    Chest pain, atypical for angina pectoris, but cannot rule this possibility out entirely. Abnormal Cardiolite scan  Abnormal EKG consistent with possible prior anteroseptal myocardial infarction    [facility]  7/20/2017    PATIENT NAME: Candi Manzano         38 y.o.      1975              DATE:7/14/2017     REASON FOR VISIT: Chest pain. Hypertension     HISTORY OF PRESENT ILLNESS: Has experienced 2 episodes of chest discomfort recently. Both of these occurred at rest.  One was a left precordial discomfort that persisted for hours on accompanied by dyspnea or diaphoresis. The second one was a substernal aching discomfort that also occurred at rest and also was not accompanied by dyspnea or diaphoresis. This persisted for about 30 minutes to an hour. Denies exertional discomfort. Denies dyspnea on exertion, orthopnea, paroxysmal nocturnal dyspnea. Denies palpitation, syncope, presyncope.     When she was first seen here, her blood pressure was poorly controlled and she was experiencing exertional anterior cervical discomfort relieved by rest.  She had a Cardiolite scan that was read as showing a fixed apical defect of moderate size that was possibly consistent with an area of old infarction. The final impression was \"moderate risk for ischemia\". Her EKGs have shown a possible old anteroseptal myocardial infarction. Her blood pressure subsequently came under control after her medications were adjusted and the exertional cervical discomfort resolved.     PAST MEDICAL HISTORY:   Past Medical History:  No date: Abnormal uterine bleeding (AUB)  No date: Anxiety and depression  No date: Asthma  01/27/2017: Cardiac echocardiogram      Comment: EF 55-60%. No WMA. Mod conc LVH. Gr 2 DDfx. Mild AI.    01/27/2017: Cardiac nuclear imaging test      Comment: Mod risk. Mod area of anterior apical                ischemia likely. No infarction. EF 48%.   Mild               anterior apical hypk. Equivocal EKG on pharm                stress test.  No date: GERD (gastroesophageal reflux disease)  No date: History of blood transfusion  1/2014: HSV-2 (herpes simplex virus 2) infection      Comment: CSF   04/25/2016: Hx of colonoscopy      Comment: internal hemorrhoids, no polyp dr Alexandrea Dawson repeat               in 10 years. 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 pylori test      Comment: placed on antibiotics and H2 blocker 6/11/14  No date: PPD positive, treated  11/17/2016: Renal duplex      Comment: No significant RA stenosis. No date: Tuberculosis      Comment: ronn, 4/14/16 6/9/14: Vitamin D deficiency     PAST SURGICAL HISTORY:   Past Surgical History:  No date: ENDOMETRIAL CRYOABLATION  04/25/2016: HX COLONOSCOPY      Comment: DR. ESTRADA REPEAT IN 2026 (10 YEARS)  1981: HX HERNIA REPAIR      Comment: umbilical      SOCIAL HISTORY:  Social History    Marital status:              Spouse name:                       Years of education:                 Number of children:                Social History Main Topics    Smoking status: Current Every Day Smoker                                                     Packs/day: 0.50      Years: 0.50        Smokeless status: Never Used                        Alcohol use: Yes           1.2 oz/week       2 Cans of beer per week       Comment: monthly 1 or 2    Drug use:  Yes                Special: Marijuana, Cocaine       Comment: Cocaine stopped 2008, Marijuana 4/2/16                 usually once a month or two    Sexual activity: Not Currently     Partners with: Female, Male       Birth control/protection: None     Other Topics            Concern   Service        Yes    Comment:[id number]  Blood Transfusions      Yes    Comment:2012  Caffeine Concern        No  Occupational Exposure   No  Hobby Hazards           No  Sleep Concern           No  Stress Concern          Yes  Weight Concern          No  Special Diet            No  Back Care               No  Exercise                No  Bike Helmet             No  Seat Belt               No  Self-Exams              No           ALLERGIES:    -- Ace Inhibitors -- Swelling      CURRENT MEDICATIONS:   Current Outpatient Prescriptions:  cholecalciferol, vitamin D3, (VITAMIN D3) 2,000 unit tab, Take  by mouth. CALCIUM CARB/MAG CARB/FOLIC AC (MAGNESIUM-CALCIUM-FOLIC ACID PO), Take  by mouth.  gabapentin (NEURONTIN) 400 mg capsule, 1 three times a day  amLODIPine (NORVASC) 10 mg tablet, Take 1 Tab by mouth daily. atorvastatin (LIPITOR) 20 mg tablet, Take 1 Tab by mouth nightly. diclofenac EC (VOLTAREN) 75 mg EC tablet, Take 1 Tab by mouth two (2) times daily as needed. ferrous sulfate 325 mg (65 mg iron) tablet, TAKE ONE TABLET BY MOUTH ONCE DAILY BEFORE BREAKFAST  GARLIC PO, Take  by mouth.  cloNIDine HCl (CATAPRES) 0.1 mg tablet, Take 1 Tab by mouth two (2) times a day. carvedilol (COREG) 25 mg tablet, Take 1 Tab by mouth two (2) times daily (with meals). sertraline (ZOLOFT) 50 mg tablet, Take 75 mg by mouth nightly. buPROPion XL (WELLBUTRIN XL) 150 mg tablet, Take 150 mg by mouth every morning.   Butalbital-Acetaminophen-Caff (FIORICET) -40 mg cap, Take 1 tablet every 8 hours as needed for headache  albuterol (PROVENTIL, VENTOLIN) 90 mcg/actuation inhaler, Take 2 Puffs by inhalation every four (4) hours as needed for Wheezing.     No current facility-administered medications for this visit.         REVIEW of SYSTEMS:History obtained from chart review and the patient  General ROS: negative for - weight gain or weight loss  Hematological and Lymphatic ROS: negative for - bleeding problems  Respiratory ROS: no cough, shortness of breath, or wheezing  Cardiovascular ROS: See history of present illness  Neurological ROS: no TIA or stroke symptoms      PHYSICAL EXAMINATION:   /84  Pulse 74  Ht 5' 3\" (1.6 m)  Wt 109.3 kg (241 lb)  SpO2 98%  BMI 42.69 kg/m2  BP Readings from Last 3 Encounters:  07/14/17 : 126/84  07/11/17 : 136/84  05/31/17 : 118/80     Pulse Readings from Last 3 Encounters:  07/14/17 : 74  07/11/17 : 74  05/31/17 : 81     Wt Readings from Last 3 Encounters:  07/14/17 : 109.3 kg (241 lb)  07/11/17 : 109.3 kg (241 lb)  05/31/17 : 108.9 kg (240 lb)     General: Obese -American female in no apparent distress. HEENT: Sclera clear. Mucous membranes pink and moist.  Neck: No jugular venous distention. Carotid upstrokes 2+ without bruits. Chest: Clear to  auscultation. Heart: PMI not palpable. Regular rhythm. No murmur or gallop. Abdomen: Obese. Unable to palpate abdominal aorta. Extremities: No edema. Skin: Warm and dry. No stasis changes. Neuro: Alert, oriented, speech WNL, no facial asymmetry. Gait WNL.    EKG: Possible anteroseptal myocardial infarction age-indeterminate     IMPRESSION:   Chest pain, atypical for angina pectoris, but cannot rule this possibility out entirely. Abnormal Cardiolite scan  Abnormal EKG consistent with possible prior anteroseptal myocardial infarction  Hypertension controlled  The anterior cervical discomfort that she was experiencing prior to the control of her blood pressure was suspicious for angina pectoris.     PLAN:  I think that given the abnormalities on EKG and Cardiolite scan left heart catheterization with coronary arteriography would be the prudent thing to do. I have discussed this with her. She agrees     The diagnoses and plan were discussed with patient. All questions answered.   Plan of care agreed to by all concerned.  Karlee Kline MD

## 2017-07-20 NOTE — PROGRESS NOTES
Cath holding summary    Patient escorted to cath holding from waiting area ambulatory, alert and oriented x 4, voicing no complaints. Changed into gown and placed on monitor. NPO since MN. Lab results, med rec and H&P reviewed on chart. PIV x 2 inserted without difficulty. 1140 patient arrived to cath holding awake and alert, vital signs stable, no C/O pain at this time, right radial site clean dry and intact with Dstat in place, will continue to monitor.

## 2017-07-20 NOTE — PROGRESS NOTES
Patient discharged home with family, vital signs stable, right radial site clean, dry, intact, and soft to touch.

## 2017-07-20 NOTE — ROUTINE PROCESS
TRANSFER - OUT REPORT:    Verbal report given to VEGA Davenport(name) on Aleajuan jose Hayes-Huitron  being transferred to [facility](unit) for routine post - op       Report consisted of patients Situation, Background, Assessment and   Recommendations(SBAR). Information from the following report(s) SBAR, Intake/Output, MAR and Recent Results was reviewed with the receiving nurse. Lines:   Peripheral IV 07/20/17 Right Antecubital (Active)       Peripheral IV 07/20/17 Left Antecubital (Active)        Opportunity for questions and clarification was provided.       Patient transported with:   [email or web address]

## 2017-07-20 NOTE — DISCHARGE INSTRUCTIONS
HEART CATHETERIZATION/ANGIOGRAPHY DISCHARGE INSTRUCTIONS    1. Check puncture site frequently for swelling or bleeding. If there is any bleeding, lie down and apply pressure over the area with a clean towel or washcloth. Notify your doctor for any redness, swelling, drainage, or oozing from the puncture site. Notify your doctor for any fever or chills. 2. If the extremity becomes cold, numb, or painful call Dr Dolores Escamilla  3. Activity should be limited for the next 48 hours. Climb stairs as little as possible and avoid any stooping, bending, or strenuous activity for 48 hours. No heavy lifting (anything over 10 pounds) for 7 days. 4. You may resume your usual diet. Drink more fluids than usual.  5. Have a responsible person drive you home and stay with you for at least 24 hours after your heart catheterization/angiography. 6. You may remove bandage from your Right and Arm in 24 hours. You may shower in 24 hours. No tub baths, hot tubs, or swimming for 1 week. Do not place any lotions, creams, powders, or ointments over puncture site for 1 week. You may place a clean band-aid over the puncture site each day for 5 days. Change daily. Coronary Angiogram: What to Expect at [address] [place] [address]     A coronary angiogram is a test to examine the large blood vessel of your heart (coronary artery). The doctor inserted a thin, flexible tube (catheter) into a blood vessel in your groin. In some cases, the catheter is placed in a blood vessel in the arm. Your groin or arm may have a bruise and feel sore for a day or two after a coronary angiogram. You can do light activities around the house but nothing strenuous for several days. This care sheet gives you a general idea about how long it will take for you to recover. But each person recovers at a different pace. Follow the steps below to feel better as quickly as possible. How can you care for yourself at home?   Activity  · Do not do strenuous exercise and do not lift, pull, or push anything heavy until your doctor says it is okay. This may be for a day or two. You can walk around the house and do light activity, such as cooking. · You may shower 24 to 48 hours after the procedure, if your doctor okays it. Pat the incision dry. Do not take a bath for 1 week, or until your doctor tells you it is okay. · If the catheter was placed in your groin, try not to walk up stairs for the first couple of days. · If the catheter was placed in your arm near your wrist, do not bend your wrist deeply for the first couple of days. Be careful using your hand to get into and out of a chair or bed. · If your doctor recommends it, get more exercise. Walking is a good choice. Bit by bit, increase the amount you walk every day. Try for at least 30 minutes on most days of the week. Diet  · Drink plenty of fluids to help your body flush out the dye. If you have kidney, heart, or liver disease and have to limit fluids, talk with your doctor before you increase the amount of fluids you drink. · Keep eating a heart-healthy diet that has lots of fruits, vegetables, and whole grains. If you have not been eating this way, talk to your doctor. You also may want to talk to a dietitian. This expert can help you to learn about healthy foods and plan meals. Medicines  · Your doctor will tell you if and when you can restart your medicines. He or she will also give you instructions about taking any new medicines. · If you take blood thinners, such as warfarin (Coumadin), clopidogrel (Plavix), or aspirin, be sure to talk to your doctor. He or she will tell you if and when to start taking those medicines again. Make sure that you understand exactly what your doctor wants you to do. · Your doctor may prescribe a blood-thinning medicine like aspirin or clopidogrel (Plavix).  It is very important that you take these medicines exactly as directed in order to keep the coronary artery open and reduce your risk of a heart attack. Be safe with medicines. Call your doctor if you think you are having a problem with your medicine. Care of the catheter site  · For the first 3 days, keep a bandage over the spot where the catheter was inserted. · Put ice or a cold pack on the area for 10 to 20 minutes at a time to help with soreness or swelling. Put a thin cloth between the ice and your skin. Sedation for a Medical Procedure: Care Instructions  Your Care Instructions  For a minor procedure or surgery, you will get a sedative to help you relax. This drug will make you sleepy. It is usually given in a vein (by IV). A shot may also be used to numb the area. If you had local anesthesia, you may feel some pain and discomfort as it wears off. If you have pain, don't be afraid to say so. Pain medicine works better if you take it before the pain gets bad. Common side effects from sedation include:  · Feeling sleepy. (Your doctors and nurses will make sure you are not too sleepy to go home.)  · Nausea and vomiting. This usually does not last long. · Feeling tired. Follow-up care is a key part of your treatment and safety. Be sure to make and go to all appointments, and call your doctor if you are having problems. It's also a good idea to know your test results and keep a list of the medicines you take. How can you care for yourself at home? Activity  · Don't do anything for 24 hours that requires attention to detail. It takes time for the medicine effects to completely wear off. · For your safety, you should not drive or operate any machinery that could be dangerous until the medicine wears off and you can think clearly and react easily. · Rest when you feel tired. Getting enough sleep will help you recover. Diet  · You can eat your normal diet, unless your doctor gives you other instructions. If your stomach is upset, try clear liquids and bland, low-fat foods like plain toast or rice.   · Drink plenty of fluids (unless your doctor tells you not to). · Don't drink alcohol for 24 hours. Medicines  · Be safe with medicines. Read and follow all instructions on the label. ¨ If the doctor gave you a prescription medicine for pain, take it as prescribed. ¨ If you are not taking a prescription pain medicine, ask your doctor if you can take an over-the-counter medicine. · If you think your pain medicine is making you sick to your stomach:  ¨ Take your medicine after meals (unless your doctor has told you not to). ¨ Ask your doctor for a different pain medicine. Follow-up care is a key part of your treatment and safety. Be sure to make and go to all appointments, and call your doctor if you are having problems. It's also a good idea to know your test results and keep a list of the medicines you take. When should you call for help? Call 911 anytime you think you may need emergency care. For example, call if:  · You passed out (lost consciousness). · You have severe trouble breathing. · You have sudden chest pain and shortness of breath, or you cough up blood. · You have symptoms of a heart attack. These may include:  ¨ Chest pain or pressure, or a strange feeling in the chest.  ¨ Sweating. ¨ Shortness of breath. ¨ Nausea or vomiting. ¨ Pain, pressure, or a strange feeling in the back, neck, jaw, or upper belly, or in one or both shoulders or arms. ¨ Lightheadedness or sudden weakness. ¨ A fast or irregular heartbeat. After you call 911, the  may tel you to chew 1 adult-strength or 2 to 4 low-dose aspirin. Wait for an ambulance. Do not try to drive yourself. · You have been diagnosed with angina, and you have symptoms that do not go away with rest or are not getting better within 5 minutes after you take a dose of nitroglycerin. Call your doctor now or seek immediate medical care if:  · You are bleeding from the area where the catheter was put in your artery.   · You have a fast-growing, painful lump at the catheter site.  · You have signs of infection, such as:  ¨ Increased pain, swelling, warmth, or redness. ¨ Red streaks leading from the catheter site. ¨ Pus draining from the catheter site. ¨ A fever. · Your leg or arm looks blue or feels cold, numb, or tingly. These are general instructions for a healthy lifestyle:    No smoking/ No tobacco products/ Avoid exposure to second hand smoke    Surgeon General's Warning:  Quitting smoking now greatly reduces serious risk to your health. Obesity, smoking, and sedentary lifestyle greatly increases your risk for illness    A healthy diet, regular physical exercise & weight monitoring are important for maintaining a healthy lifestyle    You may be retaining fluid if you have a history of heart failure or if you experience any of the following symptoms:  Weight gain of 3 pounds or more overnight or 5 pounds in a week, increased swelling in our hands or feet or shortness of breath while lying flat in bed. Please call your doctor as soon as you notice any of these symptoms; do not wait until your next office visit. Recognize signs and symptoms of STROKE:    F-face looks uneven    A-arms unable to move or move unevenly    S-speech slurred or non-existent    T-time-call 911 as soon as signs and symptoms begin-DO NOT go       Back to bed or wait to see if you get better-TIME IS BRAIN. Warning Signs of HEART ATTACK     Call 911 if you have these symptoms:   Chest discomfort. Most heart attacks involve discomfort in the center of the chest that lasts more than a few minutes, or that goes away and comes back. It can feel like uncomfortable pressure, squeezing, fullness, or pain.  Discomfort in other areas of the upper body. Symptoms can include pain or discomfort in one or both arms, the back, neck, jaw, or stomach.  Shortness of breath with or without chest discomfort.  Other signs may include breaking out in a cold sweat, nausea, or lightheadedness.   Don't wait more than five minutes to call 911 - MINUTES MATTER! Fast action can save your life. Calling 911 is almost always the fastest way to get lifesaving treatment. Emergency Medical Services staff can begin treatment when they arrive -- up to an hour sooner than if someone gets to the hospital by car. The discharge information has been reviewed with the patient and caregiver. The patient and caregiver verbalized understanding. Discharge medications reviewed with the patient and caregiver and appropriate educational materials and side effects teaching were provided.   Patient armband removed and shredded

## 2017-07-20 NOTE — PROCEDURES
CARDIAC CATHETERIZATION LABORATORY PROCEDURE REPORT    Ravinder Maria is a 38 y.o. female    Medical Record Number: [id number]    Primary Care Provider: Ander Tucker MD      Referral Provider: Che Meng MD    PROCEDURE DATE: 7/20/2017    INDICATIONS:   Chest pain, abnormal nuclear scan. MD PERFORMING PROCEDURE: [id number] Brad Tompkins MD    PROCEDURE:  Left heart catheterization, coronary angiography and left ventriculography. ANESTHESIA: Moderate sedation and local anesthesia. EBL: 10-50 ml  CONTRAST USE: Approximately 50 ml  RADIATION: 331 mGy    Risks, benefits, and alternatives to the procedure were explained to the patient prior to the procedure. Questions were answered in detail. The patient appeared to understand and appropriate informed consent was obtained. The patient was brought to the cardiac catheterization  laboratory in a post-absorptive state and right wrist was prepped and draped in the usual sterile manner. Moderate sedation was achieved with a combination of Versed (midazolam) and Fentanyl. Lidocaine was used to secure local anesthesia. The right radial artery was cannulated using a Micro-puncture kit and a 6 [place] sheath was inserted. The patient received 3000 units of IV Heparin bolus as well as 5 mg Verapamil and 200 microgram NTG through the sheath to prevent arterial vasospasm. 6 [place] Adilson Nicks catheter was used to obtain selective bilateral coronary angiograms, under fluoroscopic guidance,  in multiple projections. LV angiography was performed in the [place] projection with a hand injection. Pressures were recorded in the LV and during pull back across the aortic valve. The following were observed. FLUOROSCOPY: There was no significant calcification. HEMODYNAMIC / ANGIOGRAPHIC DATA  · Left ventricle: End diastolic pressure was 14 mmHg. Left ventriculography: The EF was estimated to be around 55 %. There was no diagnostic segmental wall motion abnormality.   · Aorta: There was no significant systolic pressure gradient across the aortic valve. · Mitral valve: There was no mitral regurgitation. · Coronary Angiography:  · The coronary circulation was right dominant. · Left main: Angiographically normal.   · Left anterior descending: Angiographically normal.  · Ramus Intermediate: Angiographically normal.  · Diagonal Branches: Angiographically normal.  · Circumflex: Angiographically normal.  · Obtuse Marginal Branches: Angiographically normal.  · Right coronary: Angiographically normal.    The patient was transferred to the observation area with stable vital signs and in an asymptomatic state. The sheath will be pulled and hemostasis will be secured with the application of pressure. There was no apparent immediate complication. SUMMARY: No significant, fixed, occlusive CAD. No further coronary evaluation is needed at this time. Moderate sedation was utilized for 15 minutes using Versed and fentanyl under my supervision. RECOMMENDATIONS:  Post-procedure care will focus on aggressive risk factor modification.      Electronically signed: Edgar Huang MD, [facility]

## 2017-07-20 NOTE — IP AVS SNAPSHOT
Ramses Elders 
 
 
 [address] Patient: Kole Bellamy MRN: [id number] :1975 You are allergic to the following Allergen Reactions Ace Inhibitors Swelling Recent Documentation Height Weight Breastfeeding? BMI OB Status Smoking Status 1.6 m 109.3 kg No 42.69 kg/m2 Having regular periods Current Every Day Smoker Emergency Contacts Name Discharge Info Relation Home Work Mobile Krystal Hayes  Parent [1] [phone] Sherine Garber  Other Relative [6] [phone] 1405 [facility] CAREGIVER [3] Spouse [3] [phone] About your hospitalization You were admitted on:  2017 You last received care in the:  [facility] You were discharged on:  2017 Unit phone number:  [phone] Why you were hospitalized Your primary diagnosis was:  Not on File Providers Seen During Your Hospitalizations Provider Role Specialty Primary office phone Eulalio Mohr MD Attending Provider Cardiology [phone] Your Primary Care Physician (PCP) Primary Care Physician Office Phone Office Fax Tracy Chavira [phone] Follow-up Information Follow up With Details Comments Contact Info Fernando Mackenzie, [address] [facility] [address] 200 [facility] 
[phone] Eulalio Mohr MD In 1 month follow up [address] [facility] [address] 200 [facility] 
[phone] Your Appointments 2017  3:00 PM EDT New Patient with Simi Landa MD  
[facility] [address]) [address] [place] [address], [address] [facility] [address]  
[phone]   2:45 PM EDT ROUTINE CARE with Fernando Mackenzie MD  
 [facility] ([address]) [address] [place] [address] [address] [facility]  
[phone] Current Discharge Medication List  
  
CONTINUE these medications which have NOT CHANGED Dose & Instructions Dispensing Information Comments Morning Noon Evening Bedtime  
 albuterol 90 mcg/actuation inhaler Commonly known as:  Jayjay Sandhu Your last dose was: Your next dose is:    
   
   
 Dose:  2 Puff Take 2 Puffs by inhalation every four (4) hours as needed for Wheezing. Quantity:  17 g Refills:  0  
     
   
   
   
  
 amLODIPine 10 mg tablet Commonly known as:  Marissa Suggs Your last dose was: Your next dose is:    
   
   
 Dose:  10 mg Take 1 Tab by mouth daily. Quantity:  30 Tab Refills:  11  
     
   
   
   
  
 atorvastatin 80 mg tablet Commonly known as:  LIPITOR Your last dose was: Your next dose is:    
   
   
 Dose:  80 mg Take 1 Tab by mouth daily. Quantity:  90 Tab Refills:  0 Blood-Glucose Meter monitoring kit Commonly known as:  FREESTYLE LITE METER Your last dose was: Your next dose is:    
   
   
 Check fasting glucose once daily Quantity:  1 Kit Refills:  0  
     
   
   
   
  
 buPROPion  mg tablet Commonly known as:  Narendra Moore Your last dose was: Your next dose is:    
   
   
 Dose:  150 mg Take 150 mg by mouth every morning. Refills:  0  
     
   
   
   
  
 butalbital-acetaminophen-caff -40 mg per capsule Commonly known as:  Mib[employer]zz[employer]tv Your last dose was: Your next dose is: Take 1 tablet every 8 hours as needed for headache Quantity:  12 Cap Refills:  0  
     
   
   
   
  
 carvedilol 25 mg tablet Commonly known as:  Kvng Ulloa Your last dose was: Your next dose is:    
   
   
 Dose:  25 mg Take 1 Tab by mouth two (2) times daily (with meals). Quantity:  60 Tab Refills:  11  
     
   
   
   
  
 cloNIDine HCl 0.1 mg tablet Commonly known as:  CATAPRES Your last dose was: Your next dose is:    
   
   
 Dose:  0.1 mg Take 1 Tab by mouth two (2) times a day. Quantity:  60 Tab Refills:  11  
     
   
   
   
  
 diclofenac EC 75 mg EC tablet Commonly known as:  VOLTAREN Your last dose was: Your next dose is:    
   
   
 Dose:  75 mg Take 1 Tab by mouth two (2) times daily as needed. Quantity:  60 Tab Refills:  5  
     
   
   
   
  
 ferrous sulfate 325 mg (65 mg iron) tablet Your last dose was: Your next dose is: TAKE ONE TABLET BY MOUTH ONCE DAILY BEFORE BREAKFAST Quantity:  90 Tab Refills:  1  
     
   
   
   
  
 gabapentin 400 mg capsule Commonly known as:  NEURONTIN Your last dose was: Your next dose is:    
   
   
 1 three times a day Quantity:  90 Cap Refills:  3 GARLIC PO Your last dose was: Your next dose is: Take  by mouth. Refills:  0  
     
   
   
   
  
 glucose blood VI test strips strip Commonly known as:  FREESTYLE LITE STRIPS Your last dose was: Your next dose is:    
   
   
 Check fasting glucose once daily Quantity:  100 Strip Refills:  2 Lancets Misc Your last dose was: Your next dose is:    
   
   
 Check fasting glucose once daily Quantity:  1 Each Refills:  11 MAGNESIUM-CALCIUM-FOLIC ACID PO Your last dose was: Your next dose is: Take  by mouth. Refills:  0  
     
   
   
   
  
 sertraline 50 mg tablet Commonly known as:  ZOLOFT Your last dose was: Your next dose is:    
   
   
 Dose:  75 mg Take 75 mg by mouth nightly. Refills:  0  
     
   
   
   
  
 VITAMIN D3 2,000 unit Tab Generic drug:  cholecalciferol (vitamin D3) Your last dose was: Your next dose is: Take  by mouth. Refills:  0 ASK your doctor about these medications Dose & Instructions Dispensing Information Comments Morning Noon Evening Bedtime  
 metFORMIN 500 mg tablet Commonly known as:  GLUCOPHAGE Your last dose was: Your next dose is:    
   
   
 Dose:  500 mg Take 1 Tab by mouth two (2) times daily (with meals). Quantity:  120 Tab Refills:  0 Discharge Instructions HEART CATHETERIZATION/ANGIOGRAPHY DISCHARGE INSTRUCTIONS 1. Check puncture site frequently for swelling or bleeding. If there is any bleeding, lie down and apply pressure over the area with a clean towel or washcloth. Notify your doctor for any redness, swelling, drainage, or oozing from the puncture site. Notify your doctor for any fever or chills. 2. If the extremity becomes cold, numb, or painful call Dr Niyah Peterson 3. Activity should be limited for the next 48 hours. Climb stairs as little as possible and avoid any stooping, bending, or strenuous activity for 48 hours. No heavy lifting (anything over 10 pounds) for 7 days. 4. You may resume your usual diet. Drink more fluids than usual. 
5. Have a responsible person drive you home and stay with you for at least 24 hours after your heart catheterization/angiography. 6. You may remove bandage from your Right and Arm in 24 hours. You may shower in 24 hours. No tub baths, hot tubs, or swimming for 1 week. Do not place any lotions, creams, powders, or ointments over puncture site for 1 week. You may place a clean band-aid over the puncture site each day for 5 days. Change daily. Coronary Angiogram: What to Expect at [facility] Your Recovery A coronary angiogram is a test to examine the large blood vessel of your heart (coronary artery).  The doctor inserted a thin, flexible tube (catheter) into a blood vessel in your groin. In some cases, the catheter is placed in a blood vessel in the arm. Your groin or arm may have a bruise and feel sore for a day or two after a coronary angiogram. You can do light activities around the house but nothing strenuous for several days. This care sheet gives you a general idea about how long it will take for you to recover. But each person recovers at a different pace. Follow the steps below to feel better as quickly as possible. How can you care for yourself at home? Activity · Do not do strenuous exercise and do not lift, pull, or push anything heavy until your doctor says it is okay. This may be for a day or two. You can walk around the house and do light activity, such as cooking. · You may shower 24 to 48 hours after the procedure, if your doctor okays it. Pat the incision dry. Do not take a bath for 1 week, or until your doctor tells you it is okay. · If the catheter was placed in your groin, try not to walk up stairs for the first couple of days. · If the catheter was placed in your arm near your wrist, do not bend your wrist deeply for the first couple of days. Be careful using your hand to get into and out of a chair or bed. · If your doctor recommends it, get more exercise. Walking is a good choice. Bit by bit, increase the amount you walk every day. Try for at least 30 minutes on most days of the week. Diet · Drink plenty of fluids to help your body flush out the dye. If you have kidney, heart, or liver disease and have to limit fluids, talk with your doctor before you increase the amount of fluids you drink. · Keep eating a heart-healthy diet that has lots of fruits, vegetables, and whole grains. If you have not been eating this way, talk to your doctor. You also may want to talk to a dietitian. This expert can help you to learn about healthy foods and plan meals. Medicines · Your doctor will tell you if and when you can restart your medicines. He or she will also give you instructions about taking any new medicines. · If you take blood thinners, such as warfarin (Coumadin), clopidogrel (Plavix), or aspirin, be sure to talk to your doctor. He or she will tell you if and when to start taking those medicines again. Make sure that you understand exactly what your doctor wants you to do. · Your doctor may prescribe a blood-thinning medicine like aspirin or clopidogrel (Plavix). It is very important that you take these medicines exactly as directed in order to keep the coronary artery open and reduce your risk of a heart attack. Be safe with medicines. Call your doctor if you think you are having a problem with your medicine. Care of the catheter site · For the first 3 days, keep a bandage over the spot where the catheter was inserted. · Put ice or a cold pack on the area for 10 to 20 minutes at a time to help with soreness or swelling. Put a thin cloth between the ice and your skin. Sedation for a Medical Procedure: Care Instructions Your Care Instructions For a minor procedure or surgery, you will get a sedative to help you relax. This drug will make you sleepy. It is usually given in a vein (by IV). A shot may also be used to numb the area. If you had local anesthesia, you may feel some pain and discomfort as it wears off. If you have pain, don't be afraid to say so. Pain medicine works better if you take it before the pain gets bad. Common side effects from sedation include: · Feeling sleepy. (Your doctors and nurses will make sure you are not too sleepy to go home.) · Nausea and vomiting. This usually does not last long. · Feeling tired. Follow-up care is a key part of your treatment and safety. Be sure to make and go to all appointments, and call your doctor if you are having problems.  It's also a good idea to know your test results and keep a list of the medicines you take. How can you care for yourself at home? Activity · Don't do anything for 24 hours that requires attention to detail. It takes time for the medicine effects to completely wear off. · For your safety, you should not drive or operate any machinery that could be dangerous until the medicine wears off and you can think clearly and react easily. · Rest when you feel tired. Getting enough sleep will help you recover. Diet · You can eat your normal diet, unless your doctor gives you other instructions. If your stomach is upset, try clear liquids and bland, low-fat foods like plain toast or rice. · Drink plenty of fluids (unless your doctor tells you not to). · Don't drink alcohol for 24 hours. Medicines · Be safe with medicines. Read and follow all instructions on the label. ¨ If the doctor gave you a prescription medicine for pain, take it as prescribed. ¨ If you are not taking a prescription pain medicine, ask your doctor if you can take an over-the-counter medicine. · If you think your pain medicine is making you sick to your stomach: 
¨ Take your medicine after meals (unless your doctor has told you not to). ¨ Ask your doctor for a different pain medicine. Follow-up care is a key part of your treatment and safety. Be sure to make and go to all appointments, and call your doctor if you are having problems. It's also a good idea to know your test results and keep a list of the medicines you take. When should you call for help? Call 911 anytime you think you may need emergency care. For example, call if: 
· You passed out (lost consciousness). · You have severe trouble breathing. · You have sudden chest pain and shortness of breath, or you cough up blood. · You have symptoms of a heart attack. These may include: ¨ Chest pain or pressure, or a strange feeling in the chest. 
¨ Sweating. ¨ Shortness of breath. ¨ Nausea or vomiting. ¨ Pain, pressure, or a strange feeling in the back, neck, jaw, or upper belly, or in one or both shoulders or arms. ¨ Lightheadedness or sudden weakness. ¨ A fast or irregular heartbeat. After you call 911, the  may tel you to chew 1 adult-strength or 2 to 4 low-dose aspirin. Wait for an ambulance. Do not try to drive yourself. · You have been diagnosed with angina, and you have symptoms that do not go away with rest or are not getting better within 5 minutes after you take a dose of nitroglycerin. Call your doctor now or seek immediate medical care if: 
· You are bleeding from the area where the catheter was put in your artery. · You have a fast-growing, painful lump at the catheter site. · You have signs of infection, such as: 
¨ Increased pain, swelling, warmth, or redness. ¨ Red streaks leading from the catheter site. ¨ Pus draining from the catheter site. ¨ A fever. · Your leg or arm looks blue or feels cold, numb, or tingly. These are general instructions for a healthy lifestyle: No smoking/ No tobacco products/ Avoid exposure to second hand smoke Surgeon General's Warning:  Quitting smoking now greatly reduces serious risk to your health. Obesity, smoking, and sedentary lifestyle greatly increases your risk for illness A healthy diet, regular physical exercise & weight monitoring are important for maintaining a healthy lifestyle You may be retaining fluid if you have a history of heart failure or if you experience any of the following symptoms:  Weight gain of 3 pounds or more overnight or 5 pounds in a week, increased swelling in our hands or feet or shortness of breath while lying flat in bed. Please call your doctor as soon as you notice any of these symptoms; do not wait until your next office visit. Recognize signs and symptoms of STROKE: 
 
F-face looks uneven A-arms unable to move or move unevenly S-speech slurred or non-existent T-time-call 911 as soon as signs and symptoms begin-DO NOT go Back to bed or wait to see if you get better-TIME IS BRAIN. Warning Signs of HEART ATTACK Call 911 if you have these symptoms: 
? Chest discomfort. Most heart attacks involve discomfort in the center of the chest that lasts more than a few minutes, or that goes away and comes back. It can feel like uncomfortable pressure, squeezing, fullness, or pain. ? Discomfort in other areas of the upper body. Symptoms can include pain or discomfort in one or both arms, the back, neck, jaw, or stomach. ? Shortness of breath with or without chest discomfort. ? Other signs may include breaking out in a cold sweat, nausea, or lightheadedness. Don't wait more than five minutes to call [address]! Fast action can save your life. Calling 911 is almost always the fastest way to get lifesaving treatment. Emergency Medical Services staff can begin treatment when they arrive  up to an hour sooner than if someone gets to the hospital by car. The discharge information has been reviewed with the patient and caregiver. The patient and caregiver verbalized understanding. Discharge medications reviewed with the patient and caregiver and appropriate educational materials and side effects teaching were provided. Patient armband removed and shredded Discharge Orders None Introducing [facility] SERVICES! Dear Meli Padgett: Thank you for requesting a [employer] account. Our records indicate that you already have an active [employer] account. You can access your account anytime at [email or web address] Did you know that you can access your hospital and ER discharge instructions at any time in [employer]? You can also review all of your test results from your hospital stay or ER visit. Additional Information If you have questions, please visit the Frequently Asked Questions section of the [employer] website at [email or web address] Remember, MyChart is NOT to be used for urgent needs. For medical emergencies, dial 911. Now available from your iPhone and Android! General Information Please provide this summary of care documentation to your next provider. Patient Signature:  ____________________________________________________________ Date:  ____________________________________________________________  
  
Mary Jo Oka Provider Signature:  ____________________________________________________________ Date:  ____________________________________________________________

## 2017-07-20 NOTE — ROUTINE PROCESS
TRANSFER - IN REPORT:    Verbal report received from [facility], RN. (name) on Alexander Albrecht  being received from [facility](unit) for ordered procedure      Report consisted of patients Situation, Background, Assessment and   Recommendations(SBAR). Information from the following report(s) SBAR was reviewed with the receiving nurse. Opportunity for questions and clarification was provided. Assessment completed upon patients arrival to unit and care assumed.

## 2017-07-25 ENCOUNTER — PATIENT OUTREACH (OUTPATIENT)
Dept: FAMILY MEDICINE CLINIC | Age: 42
End: 2017-07-25

## 2017-08-02 ENCOUNTER — HOSPITAL ENCOUNTER (OUTPATIENT)
Dept: LAB | Age: 42
Discharge: HOME | End: 2017-08-02
Payer: MEDICAID

## 2017-08-02 DIAGNOSIS — E78.00 PURE HYPERCHOLESTEROLEMIA: ICD-10-CM

## 2017-08-02 DIAGNOSIS — R73.09 ELEVATED HEMOGLOBIN A1C: ICD-10-CM

## 2017-08-02 DIAGNOSIS — R73.09 ELEVATED GLUCOSE: ICD-10-CM

## 2017-08-02 LAB
ANION GAP BLD CALC-SCNC: 8 MMOL/L (ref 3–18)
BUN SERPL-MCNC: 9 MG/DL (ref 7–18)
BUN/CREAT SERPL: 14 (ref 12–20)
CALCIUM SERPL-MCNC: 9.2 MG/DL (ref 8.5–10.1)
CHLORIDE SERPL-SCNC: 104 MMOL/L (ref 100–108)
CHOLEST SERPL-MCNC: 157 MG/DL
CO2 SERPL-SCNC: 26 MMOL/L (ref 21–32)
CREAT SERPL-MCNC: 0.63 MG/DL (ref 0.6–1.3)
GLUCOSE SERPL-MCNC: 151 MG/DL (ref 74–99)
HBA1C MFR BLD: 7.5 % (ref 4.2–5.6)
HDLC SERPL-MCNC: 53 MG/DL (ref 40–60)
HDLC SERPL: 3 {RATIO} (ref 0–5)
LDLC SERPL CALC-MCNC: 92 MG/DL (ref 0–100)
LIPID PROFILE,FLP: NORMAL
POTASSIUM SERPL-SCNC: 4.2 MMOL/L (ref 3.5–5.5)
SODIUM SERPL-SCNC: 138 MMOL/L (ref 136–145)
TRIGL SERPL-MCNC: 60 MG/DL (ref ?–150)
VLDLC SERPL CALC-MCNC: 12 MG/DL

## 2017-08-02 PROCEDURE — 80048 BASIC METABOLIC PNL TOTAL CA: CPT | Performed by: FAMILY MEDICINE

## 2017-08-02 PROCEDURE — 83036 HEMOGLOBIN GLYCOSYLATED A1C: CPT | Performed by: FAMILY MEDICINE

## 2017-08-02 PROCEDURE — 36415 COLL VENOUS BLD VENIPUNCTURE: CPT | Performed by: FAMILY MEDICINE

## 2017-08-02 PROCEDURE — 80061 LIPID PANEL: CPT | Performed by: FAMILY MEDICINE

## 2017-08-03 ENCOUNTER — PATIENT OUTREACH (OUTPATIENT)
Dept: FAMILY MEDICINE CLINIC | Age: 42
End: 2017-08-03

## 2017-08-03 ENCOUNTER — OFFICE VISIT (OUTPATIENT)
Dept: FAMILY MEDICINE CLINIC | Age: 42
End: 2017-08-03

## 2017-08-03 VITALS
RESPIRATION RATE: 16 BRPM | TEMPERATURE: 98.6 F | OXYGEN SATURATION: 97 % | WEIGHT: 243 LBS | DIASTOLIC BLOOD PRESSURE: 82 MMHG | BODY MASS INDEX: 43.05 KG/M2 | HEART RATE: 83 BPM | HEIGHT: 63 IN | SYSTOLIC BLOOD PRESSURE: 130 MMHG

## 2017-08-03 DIAGNOSIS — E55.9 VITAMIN D DEFICIENCY: ICD-10-CM

## 2017-08-03 DIAGNOSIS — E11.9 TYPE 2 DIABETES MELLITUS WITHOUT COMPLICATION, WITHOUT LONG-TERM CURRENT USE OF INSULIN (HCC): Primary | ICD-10-CM

## 2017-08-03 DIAGNOSIS — E78.00 PURE HYPERCHOLESTEROLEMIA: ICD-10-CM

## 2017-08-03 DIAGNOSIS — I10 ESSENTIAL HYPERTENSION: ICD-10-CM

## 2017-08-03 DIAGNOSIS — G44.219 EPISODIC TENSION-TYPE HEADACHE, NOT INTRACTABLE: ICD-10-CM

## 2017-08-03 DIAGNOSIS — Z72.0 TOBACCO USE: ICD-10-CM

## 2017-08-03 RX ORDER — ATORVASTATIN CALCIUM 80 MG/1
80 TABLET, FILM COATED ORAL DAILY
Qty: 90 TAB | Refills: 2 | Status: SHIPPED | OUTPATIENT
Start: 2017-08-03

## 2017-08-03 RX ORDER — METFORMIN HYDROCHLORIDE 1000 MG/1
1000 TABLET ORAL 2 TIMES DAILY WITH MEALS
Qty: 180 TAB | Refills: 1 | Status: SHIPPED | OUTPATIENT
Start: 2017-08-03

## 2017-08-03 NOTE — PROGRESS NOTES
Met with patient regarding diabetes Patient identified with 2 identifiers; name and date of birth. .    Focused assessment patient seen today for initial diabetes education. Patient reports poor eating routines often skipping several meals due to her busy schedule and then eating large amounts of often not the best choices. Patient reports she recently had a cardiac Cath with no blockages found. Patient has hypertension and family history of CHF. Patient stated she is not consistant with taking blood sugars or blood pressure even though she has both machines by her bed. Interventions for Diabetes Goals          Long Term Goal: To maintain and/or decrease biometrics to A1C </or= to 7, /80 or less, LDL less than 100 or less than 70 in patients with CAD. SRO 1: Patient will monitor biometrics and report decreased or elevated ranges to health care team.                Educate how to use glucometer and record readings check blood sugar twice a day for 1 week and write them down and bring them to next meeting with Nurse Navigator                Educate on how to home monitor blood pressure  Check Blood pressure once a day for 1 week and write them down and bring them to next meeting with Nurse Navigator  Patient will also weigh herself daily at approximately the same time and write down                 SRO 2:  Patient will understand effects of food/beverages on blood glucose and how it effects the organs in the body. Evaluate food diary on daily basis. Educate on proper nutrient management for diabetes. Educate on portion control    Educate on how to read a food label     Assist patient to develop a personalized meal plan    Educate proper food selection when eating out    Educate of how alcohol effects blood glucose   Above education ongoing    Review plan of care with patient. Patient has provided input to plan and agrees with goals. Next re-evaluation 8/11/2017.

## 2017-08-03 NOTE — PATIENT INSTRUCTIONS

## 2017-08-03 NOTE — MR AVS SNAPSHOT
Visit Information Date & Time Provider Department Dept. Phone Encounter #  
 8/3/2017  2:45 PM Annemarie Cisneros, [address] [id number] Follow-up Instructions Return in about 5 months (around 1/3/2018). Your Appointments 8/18/2017  3:30 PM  
New Patient with Neva Lizarraga MD  
[address] [facility], [address] [facility] [address] [facility]) Appt Note: /  
 [address] [facility], [address] [place] [address]  
[phone]  
  
   
 [address] [facility], [address] [address] Upcoming Health Maintenance Date Due INFLUENZA AGE 9 TO ADULT 8/1/2017 PAP AKA CERVICAL CYTOLOGY 8/21/2018 DTaP/Tdap/Td series (2 - Td) 10/25/2026 Allergies as of 8/3/2017  Review Complete On: 8/3/2017 By: January Santiago LPN Severity Noted Reaction Type Reactions Ace Inhibitors  09/26/2012    Swelling Current Immunizations  Reviewed on 10/25/2016 Name Date Influenza Vaccine (Quad) PF 10/25/2016 Influenza Vaccine PF 12/11/2013 Pneumococcal Polysaccharide (PPSV-23) 10/25/2016 Tdap 10/25/2016 Not reviewed this visit You Were Diagnosed With   
  
 Codes Comments Type 2 diabetes mellitus without complication, without long-term current use of insulin (HCC)    -  Primary ICD-10-CM: E11.9 ICD-9-CM: 250.00 Essential hypertension     ICD-10-CM: I10 
ICD-9-CM: 401.9 Pure hypercholesterolemia     ICD-10-CM: E78.00 ICD-9-CM: 272.0 Vitals BP Pulse Temp Resp Height(growth percentile) Weight(growth percentile) 130/82 (BP 1 Location: Right arm, BP Patient Position: Sitting) 83 98.6 °F (37 °C) (Oral) 16 5' 3\" (1.6 m) 243 lb (110.2 kg) SpO2 BMI OB Status Smoking Status 97% 43.05 kg/m2 Having regular periods Current Every Day Smoker Vitals History BMI and BSA Data Body Mass Index Body Surface Area 43.05 kg/m 2 2.21 m 2 Preferred Pharmacy Pharmacy Name Phone [place] [address] [phone] Your Updated Medication List  
  
   
This list is accurate as of: 8/3/17  3:16 PM.  Always use your most recent med list.  
  
  
  
  
 albuterol 90 mcg/actuation inhaler Commonly known as:  Izella Lacrosse Take 2 Puffs by inhalation every four (4) hours as needed for Wheezing. amLODIPine 10 mg tablet Commonly known as:  Remonia Grates Take 1 Tab by mouth daily. atorvastatin 80 mg tablet Commonly known as:  LIPITOR Take 1 Tab by mouth daily. Blood-Glucose Meter monitoring kit Commonly known as:  FREESTYLE LITE METER Check fasting glucose once daily buPROPion  mg tablet Commonly known as:  Erling Blanks Take 150 mg by mouth every morning. butalbital-acetaminophen-caff -40 mg per capsule Commonly known as:  [employer] Take 1 tablet every 8 hours as needed for headache  
  
 carvedilol 25 mg tablet Commonly known as:  Lydia Emmie Take 1 Tab by mouth two (2) times daily (with meals). cloNIDine HCl 0.1 mg tablet Commonly known as:  CATAPRES Take 1 Tab by mouth two (2) times a day. diclofenac EC 75 mg EC tablet Commonly known as:  VOLTAREN Take 1 Tab by mouth two (2) times daily as needed. ferrous sulfate 325 mg (65 mg iron) tablet TAKE ONE TABLET BY MOUTH ONCE DAILY BEFORE BREAKFAST  
  
 gabapentin 400 mg capsule Commonly known as:  NEURONTIN  
1 three times a day GARLIC PO Take  by mouth. glucose blood VI test strips strip Commonly known as:  FREESTYLE LITE STRIPS Check fasting glucose once daily Lancets Misc Check fasting glucose once daily MAGNESIUM-CALCIUM-FOLIC ACID PO Take  by mouth.  
  
 metFORMIN 1,000 mg tablet Commonly known as:  GLUCOPHAGE Take 1 Tab by mouth two (2) times daily (with meals). sertraline 50 mg tablet Commonly known as:  ZOLOFT  
 Take 75 mg by mouth nightly. VITAMIN D3 2,000 unit Tab Generic drug:  cholecalciferol (vitamin D3) Take  by mouth. Prescriptions Sent to Pharmacy Refills  
 metFORMIN (GLUCOPHAGE) 1,000 mg tablet 1 Sig: Take 1 Tab by mouth two (2) times daily (with meals). Class: Normal  
 [facility] [address] [facility] Ph #: [phone] Route: Oral  
 atorvastatin (LIPITOR) 80 mg tablet 2 Sig: Take 1 Tab by mouth daily. Class: Normal  
 Pharmacy: [address] Ph #: [phone] Route: Oral  
  
We Performed the Following  DIABETES FOOT EXAM [HM7 Custom] REFERRAL TO PODIATRY [REF90 Custom] Follow-up Instructions Return in about 5 months (around 1/3/2018). Referral Information Referral ID Referred By Referred To  
  
 [id number] [facility] [address] Phone: [phone] Fax: [phone] Visits Status Start Date End Date 1 New Request 8/3/17 8/3/18 If your referral has a status of pending review or denied, additional information will be sent to support the outcome of this decision. Patient Instructions Learning About Diabetes Food Guidelines Your Care Instructions Meal planning is important to manage diabetes. It helps keep your blood sugar at a target level (which you set with your doctor). You don't have to eat special foods. You can eat what your family eats, including sweets once in a while. But you do have to pay attention to how often you eat and how much you eat of certain foods. You may want to work with a dietitian or a certified diabetes educator (CDE) to help you plan meals and snacks. A dietitian or CDE can also help you lose weight if that is one of your goals. What should you know about eating carbs? Managing the amount of carbohydrate (carbs) you eat is an important part of healthy meals when you have diabetes. Carbohydrate is found in many foods. · Learn which foods have carbs. And learn the amounts of carbs in different foods. ¨ Bread, cereal, pasta, and rice have about 15 grams of carbs in a serving. A serving is 1 slice of bread (1 ounce), ½ cup of cooked cereal, or 1/3 cup of cooked pasta or rice. ¨ Fruits have 15 grams of carbs in a serving. A serving is 1 small fresh fruit, such as an apple or orange; ½ of a banana; ½ cup of cooked or canned fruit; ½ cup of fruit juice; 1 cup of melon or raspberries; or 2 tablespoons of dried fruit. ¨ Milk and no-sugar-added yogurt have 15 grams of carbs in a serving. A serving is 1 cup of milk or 2/3 cup of no-sugar-added yogurt. ¨ Starchy vegetables have 15 grams of carbs in a serving. A serving is ½ cup of mashed potatoes or sweet potato; 1 cup winter squash; ½ of a small baked potato; ½ cup of cooked beans; or ½ cup cooked corn or green peas. · Learn how much carbs to eat each day and at each meal. A dietitian or CDE can teach you how to keep track of the amount of carbs you eat. This is called carbohydrate counting. · If you are not sure how to count carbohydrate grams, use the Plate Method to plan meals. It is a good, quick way to make sure that you have a balanced meal. It also helps you spread carbs throughout the day. ¨ Divide your plate by types of foods. Put non-starchy vegetables on half the plate, meat or other protein food on one-quarter of the plate, and a grain or starchy vegetable in the final quarter of the plate. To this you can add a small piece of fruit and 1 cup of milk or yogurt, depending on how many carbs you are supposed to eat at a meal. 
· Try to eat about the same amount of carbs at each meal. Do not \"save up\" your daily allowance of carbs to eat at one meal. 
· Proteins have very little or no carbs per serving.  Examples of proteins are beef, chicken, turkey, fish, eggs, tofu, cheese, cottage cheese, and peanut butter. A serving size of meat is 3 ounces, which is about the size of a deck of cards. Examples of meat substitute serving sizes (equal to 1 ounce of meat) are 1/4 cup of cottage cheese, 1 egg, 1 tablespoon of peanut butter, and ½ cup of tofu. How can you eat out and still eat healthy? · Learn to estimate the serving sizes of foods that have carbohydrate. If you measure food at home, it will be easier to estimate the amount in a serving of restaurant food. · If the meal you order has too much carbohydrate (such as potatoes, corn, or baked beans), ask to have a low-carbohydrate food instead. Ask for a salad or green vegetables. · If you use insulin, check your blood sugar before and after eating out to help you plan how much to eat in the future. · If you eat more carbohydrate at a meal than you had planned, take a walk or do other exercise. This will help lower your blood sugar. What else should you know? · Limit saturated fat, such as the fat from meat and dairy products. This is a healthy choice because people who have diabetes are at higher risk of heart disease. So choose lean cuts of meat and nonfat or low-fat dairy products. Use olive or canola oil instead of butter or shortening when cooking. · Don't skip meals. Your blood sugar may drop too low if you skip meals and take insulin or certain medicines for diabetes. · Check with your doctor before you drink alcohol. Alcohol can cause your blood sugar to drop too low. Alcohol can also cause a bad reaction if you take certain diabetes medicines. Follow-up care is a key part of your treatment and safety. Be sure to make and go to all appointments, and call your doctor if you are having problems. It's also a good idea to know your test results and keep a list of the medicines you take. Where can you learn more? Go to http://juan ramon-chuy.info/. Enter C064 in the search box to learn more about \"Learning About Diabetes Food Guidelines. \" Current as of: March 13, 2017 Content Version: 11.3 © 3721-6136 [employer]. Care instructions adapted under license by [employer] (which disclaims liability or warranty for this information). If you have questions about a medical condition or this instruction, always ask your healthcare professional. [address] any warranty or liability for your use of this information. Introducing [facility]! Dear Peter Rogers: Thank you for requesting a Cernium account. Our records indicate that you already have an active Cernium account. You can access your account anytime at [email or web address] Did you know that you can access your hospital and ER discharge instructions at any time in Cernium? You can also review all of your test results from your hospital stay or ER visit. Additional Information If you have questions, please visit the Frequently Asked Questions section of the Cernium website at [email or web address] Remember, Cernium is NOT to be used for urgent needs. For medical emergencies, dial 911. Now available from your iPhone and Android! Please provide this summary of care documentation to your next provider. Your primary care clinician is listed as Dionicio Hirsch. If you have any questions after today's visit, please call [phone].

## 2017-08-03 NOTE — PROGRESS NOTES
Chief Complaint   Patient presents with    Hypertension    Cholesterol Problem    Blood sugar problem     elevateed glucose    Toe Pain     bilat great toe pain     Skin Problem     skin tear left forearm from taking tape of blood draw site     1. Have you been to the ER, urgent care clinic since your last visit? Hospitalized since your last visit? No    2. Have you seen or consulted any other health care providers outside of the [address] [place] [address] since your last visit? Include any pap smears or colon screening.  Dr. Uzma Burk cards

## 2017-08-03 NOTE — PROGRESS NOTES
Alea HayesGradyHuitron, 38 y.o.,  female    SUBJECTIVE   ff-up    DM- new diagnosis, able to tolerate metformin, says trying to follow diet, difficult to curb her cravings, late night snacking with pizza. Requesting podiatry referral as well, denies paresthesia. Has yet to schedule with eye doctor for dilated exam.     HTN-taking medications without problems. She continues to smoke. She had cardiac catheterization without evidence of CAD    HL- increased lipitor dose on last visit, tolerating. Reviewed labs    anemia-  H/o  chronic menorrhagia has yet to see dr. Helena Yo, there was some discussion on endometrial ablation after D and C previously. she does feel tired, and on oral iron replacement for several years. Denies melena, constipation. Has had colonoscopy 4/2016 showing internal hemorrhoids, normal EGD/no polyp (Dr. Reji De La Vega). She reports unable to ff-up with hematologist dr. Josefina Almeida due to insurance coverage. She says she is unable to see gyne due to cost.     Currently seeing dr. Brandie Luevano for bilateral hand pain, EMG reviewed c/w carpal tunnel. Says saw psychologist in [address], and was dx with bipolar disorder, previously was anxiety/depression. She does not agree with diagnosis and hence did not ff-up. ROS:  See HPI, all others negative        Patient Active Problem List   Diagnosis Code    HTN (hypertension) I10    Gastroesophageal reflux disease K21.9    Anxiety and depression F41.9, F32.9    Positive H. pylori test A04.8    Iron deficiency anemia D50.9    Vitamin D deficiency E55.9    Tobacco use Z72.0    Bilateral carpal tunnel syndrome G56.03    Pure hypercholesterolemia E78.00       Current Outpatient Prescriptions   Medication Sig Dispense Refill    metFORMIN (GLUCOPHAGE) 1,000 mg tablet Take 1 Tab by mouth two (2) times daily (with meals). 180 Tab 1    atorvastatin (LIPITOR) 80 mg tablet Take 1 Tab by mouth daily.  90 Tab 2    Blood-Glucose Meter (FREESTYLE LITE METER) monitoring kit Check fasting glucose once daily 1 Kit 0    glucose blood VI test strips (FREESTYLE LITE STRIPS) strip Check fasting glucose once daily 100 Strip 2    Lancets misc Check fasting glucose once daily 1 Each 11    butalbital-acetaminophen-caff (FIORICET) -40 mg per capsule Take 1 tablet every 8 hours as needed for headache 12 Cap 0    cholecalciferol, vitamin D3, (VITAMIN D3) 2,000 unit tab Take  by mouth.  CALCIUM CARB/MAG CARB/FOLIC AC (MAGNESIUM-CALCIUM-FOLIC ACID PO) Take  by mouth.  gabapentin (NEURONTIN) 400 mg capsule 1 three times a day 90 Cap 3    amLODIPine (NORVASC) 10 mg tablet Take 1 Tab by mouth daily. 30 Tab 11    diclofenac EC (VOLTAREN) 75 mg EC tablet Take 1 Tab by mouth two (2) times daily as needed. 60 Tab 5    ferrous sulfate 325 mg (65 mg iron) tablet TAKE ONE TABLET BY MOUTH ONCE DAILY BEFORE BREAKFAST 90 Tab 1    GARLIC PO Take  by mouth.  cloNIDine HCl (CATAPRES) 0.1 mg tablet Take 1 Tab by mouth two (2) times a day. 60 Tab 11    carvedilol (COREG) 25 mg tablet Take 1 Tab by mouth two (2) times daily (with meals). 60 Tab 11    sertraline (ZOLOFT) 50 mg tablet Take 75 mg by mouth nightly.  buPROPion XL (WELLBUTRIN XL) 150 mg tablet Take 150 mg by mouth every morning.  albuterol (PROVENTIL, VENTOLIN) 90 mcg/actuation inhaler Take 2 Puffs by inhalation every four (4) hours as needed for Wheezing. 17 g 0       Allergies   Allergen Reactions    Ace Inhibitors Swelling       Past Medical History:   Diagnosis Date    Abnormal uterine bleeding (AUB)     Anxiety and depression     Asthma     Cardiac echocardiogram 01/27/2017    EF 55-60%. No WMA. Mod conc LVH. Gr 2 DDfx. Mild AI.  Cardiac nuclear imaging test 01/27/2017    Mod risk. Mod area of anterior apical ischemia likely. No infarction. EF 48%. Mild anterior apical hypk.   Equivocal EKG on pharm stress test.   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 repeat in 10 years. 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 pylori test 6/9/14    placed on antibiotics and H2 blocker 6/11/14    PPD positive, treated     Renal duplex 11/17/2016    No significant RA stenosis.  Tuberculosis     denies, 4/14/16    Vitamin D deficiency 6/9/14       Social History     Social History    Marital status:      Spouse name: N/A    Number of children: N/A    Years of education: N/A     Occupational History    Not on file.      Social History Main Topics    Smoking status: Current Every Day Smoker     Packs/day: 0.50     Years: 0.50    Smokeless tobacco: Never Used    Alcohol use 1.2 oz/week     2 Cans of beer per week      Comment: monthly 1 or 2    Drug use: Yes     Special: Marijuana, Cocaine      Comment: Cocaine stopped 2008, Marijuana 4/2/16 usually once a month or two    Sexual activity: Not Currently     Partners: Female, Male     Birth control/ protection: None     Other Topics Concern     Service Yes     [id number]    Blood Transfusions Yes     2012    Caffeine Concern No    Occupational Exposure No    Hobby Hazards No    Sleep Concern No    Stress Concern Yes    Weight Concern No    Special Diet No    Back Care No    Exercise No    Bike Helmet No    Seat Belt No    Self-Exams No     Social History Narrative       Family History   Problem Relation Age of Onset    Hypertension Mother     Diabetes Mother     Depression Mother    [facility] Substance Abuse Mother      Tobacco use    Migraines Mother     High Cholesterol Mother     Heart Attack Mother     Heart Failure Mother     Eczema Mother     Arthritis-osteo Mother     Hypertension Father     Diabetes Father     Substance Abuse Father      Tobacco use and drug abuse    Stroke Paternal Aunt     Heart Disease Maternal Grandfather     Hypertension Maternal Grandfather     High Cholesterol Maternal Grandfather     Diabetes Maternal Grandfather     Stroke Maternal Grandfather     Heart Attack Maternal Grandfather     Heart Failure Maternal Grandfather     Alcohol abuse Brother      Drug/Alcohol abuse    Substance Abuse Maternal Grandmother      Tobacco use - MGM, MGF, and brother,    Diabetes Maternal Grandmother     Hypertension Brother     High Cholesterol Brother     Diabetes Sister     Diabetes Paternal Grandmother     Heart Attack Brother     Heart Failure Brother          OBJECTIVE    Physical Exam:     Visit Vitals    /82 (BP 1 Location: Right arm, BP Patient Position: Sitting)    Pulse 83    Temp 98.6 °F (37 °C) (Oral)    Resp 16    Ht 5' 3\" (1.6 m)    Wt 243 lb (110.2 kg)    SpO2 97%    BMI 43.05 kg/m2       General: alert, obese, AA, in no apparent distress or pain  Neck: supple, no adenopathy palpated  CVS: normal rate, regular rhythm, distinct S1 and S2  Lungs:clear to ausculation bilaterally, no crackles, wheezing or rhonchi noted  Extremities: no edema, no cyanosis, feet no lesions normal monofilament. Skin: warm, mild abrasion L antecubital area, does not appear infected.    Psych:  mood and affect normal    Results for orders placed or performed during the hospital encounter of 08/02/17   HEMOGLOBIN A1C W/O EAG   Result Value Ref Range    Hemoglobin A1c 7.5 (H) 4.2 - 5.6 %   METABOLIC PANEL, BASIC   Result Value Ref Range    Sodium 138 136 - 145 mmol/L    Potassium 4.2 3.5 - 5.5 mmol/L    Chloride 104 100 - 108 mmol/L    CO2 26 21 - 32 mmol/L Anion gap 8 3.0 - 18 mmol/L    Glucose 151 (H) 74 - 99 mg/dL    BUN 9 7.0 - 18 MG/DL    Creatinine 0.63 0.6 - 1.3 MG/DL    BUN/Creatinine ratio 14 12 - 20      GFR est AA >60 >60 ml/min/1.73m2    GFR est non-AA >60 >60 ml/min/1.73m2    Calcium 9.2 8.5 - 10.1 MG/DL   LIPID PANEL   Result Value Ref Range    LIPID PROFILE          Cholesterol, total 157 <200 MG/DL    Triglyceride 60 <150 MG/DL    HDL Cholesterol 53 40 - 60 MG/DL    LDL, calculated 92 0 - 100 MG/DL    VLDL, calculated 12 MG/DL    CHOL/HDL Ratio 3.0 0 - 5.0       Results for orders placed or performed during the hospital encounter of 08/02/17   HEMOGLOBIN A1C W/O EAG   Result Value Ref Range    Hemoglobin A1c 7.5 (H) 4.2 - 5.6 %   METABOLIC PANEL, BASIC   Result Value Ref Range    Sodium 138 136 - 145 mmol/L    Potassium 4.2 3.5 - 5.5 mmol/L    Chloride 104 100 - 108 mmol/L    CO2 26 21 - 32 mmol/L    Anion gap 8 3.0 - 18 mmol/L    Glucose 151 (H) 74 - 99 mg/dL    BUN 9 7.0 - 18 MG/DL    Creatinine 0.63 0.6 - 1.3 MG/DL    BUN/Creatinine ratio 14 12 - 20      GFR est AA >60 >60 ml/min/1.73m2    GFR est non-AA >60 >60 ml/min/1.73m2    Calcium 9.2 8.5 - 10.1 MG/DL   LIPID PANEL   Result Value Ref Range    LIPID PROFILE          Cholesterol, total 157 <200 MG/DL    Triglyceride 60 <150 MG/DL    HDL Cholesterol 53 40 - 60 MG/DL    LDL, calculated 92 0 - 100 MG/DL    VLDL, calculated 12 MG/DL    CHOL/HDL Ratio 3.0 0 - 5.0           ASSESSMENT/PLAN  Alea was seen today for hand pain.     Diagnoses and all orders for this visit:  Diabetes mellitus without complication  Improving 4.0>0.9  Increase metformin to 1000 mg bid  Referred to dietician- has yet to schedule, she is meeting with our NN your for further education  Eye exam-referral placed, has yet to schedule  Foot exam- 7/17  Microalbumin-7/17  Lipid panel -8/17at goal LDL <100, cont increased lipitor dose 80 mg qhs  a1c -8/17  Referral to podiatry    Essential hypertension  Controlled, cont current regimen  on clonidine/coreg, norvasc, chlorthalidone  Intolerant to ACE  Counseled on smoking cessation    Iron deficiency anemia due to chronic blood loss   likely due to menorrhagia, mild hgb 10.8  Pt says cannot see gyne at this time due to insurance issues. there was previous discussion on possible ablation. She has had D and Cs. Cont po fe at this time, take with vit c.   Colonoscopy/egd 4/2016 normal, internal hemorrhoids noted. .   Cont po fe supplementation      Vitamin D deficiency  Improved, to take 1000 iu daily. BMI 40.0-44.9, adult (HCC)    Anxiety and depression  Encouraged ff-up appt with CPA for further evaluation, pt is agreeable, and would schedule herself    Pure hypercholesterolemia-  At goal on lipitor 80 mg qhs, to cont    Tobacco use  Counseled on cessation    Allergic rhinitis  Cont  clarinex    Tension headache  Symptomatic, refilled fioricet    Follow-up Disposition:  Return in about 5 months (around 1/3/2018). Patient understands plan of care. Patient has provided input and agrees with goals.

## 2017-08-08 ENCOUNTER — TELEPHONE (OUTPATIENT)
Dept: FAMILY MEDICINE CLINIC | Age: 42
End: 2017-08-08

## 2017-08-08 NOTE — TELEPHONE ENCOUNTER
Kenneth Meigs Hasting states that don't take [employer]. Will need to be referred to another provider.  Thank you

## 2017-08-15 ENCOUNTER — HOSPITAL ENCOUNTER (EMERGENCY)
Age: 42
Discharge: HOME | End: 2017-08-15
Attending: EMERGENCY MEDICINE
Payer: SELF-PAY

## 2017-08-15 VITALS
WEIGHT: 243.2 LBS | HEIGHT: 63 IN | HEART RATE: 71 BPM | SYSTOLIC BLOOD PRESSURE: 180 MMHG | TEMPERATURE: 98.6 F | DIASTOLIC BLOOD PRESSURE: 97 MMHG | RESPIRATION RATE: 17 BRPM | BODY MASS INDEX: 43.09 KG/M2 | OXYGEN SATURATION: 100 %
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
## 2017-08-15 DIAGNOSIS — M25.511 PAIN OF BOTH SHOULDER JOINTS: ICD-10-CM

## 2017-08-15 DIAGNOSIS — M79.10 MUSCLE ACHE: ICD-10-CM

## 2017-08-15 DIAGNOSIS — M25.512 PAIN OF BOTH SHOULDER JOINTS: ICD-10-CM

## 2017-08-15 LAB
ATRIAL RATE: 75 BPM
CALCULATED P AXIS, ECG09: 65 DEGREES
CALCULATED R AXIS, ECG10: -17 DEGREES
CALCULATED T AXIS, ECG11: 56 DEGREES
DIAGNOSIS, 93000: NORMAL
P-R INTERVAL, ECG05: 158 MS
Q-T INTERVAL, ECG07: 392 MS
QRS DURATION, ECG06: 86 MS
QTC CALCULATION (BEZET), ECG08: 437 MS
VENTRICULAR RATE, ECG03: 75 BPM

## 2017-08-15 PROCEDURE — 93005 ELECTROCARDIOGRAM TRACING: CPT

## 2017-08-15 PROCEDURE — 99284 EMERGENCY DEPT VISIT MOD MDM: CPT
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
## 2017-08-18 ENCOUNTER — OFFICE VISIT (OUTPATIENT)
Dept: ORTHOPEDIC SURGERY | Facility: CLINIC | Age: 42
End: 2017-08-18

## 2017-08-18 VITALS
RESPIRATION RATE: 18 BRPM | DIASTOLIC BLOOD PRESSURE: 83 MMHG | HEART RATE: 84 BPM | WEIGHT: 239.8 LBS | HEIGHT: 63 IN | TEMPERATURE: 99.5 F | OXYGEN SATURATION: 96 % | SYSTOLIC BLOOD PRESSURE: 148 MMHG | BODY MASS INDEX: 42.49 KG/M2

## 2017-08-18 DIAGNOSIS — G56.03 BILATERAL CARPAL TUNNEL SYNDROME: Primary | ICD-10-CM

## 2017-08-18 DIAGNOSIS — M54.6 ACUTE MIDLINE THORACIC BACK PAIN: ICD-10-CM

## 2017-08-18 DIAGNOSIS — S16.1XXA CERVICAL STRAIN, INITIAL ENCOUNTER: ICD-10-CM

## 2017-08-18 DIAGNOSIS — S39.012A BACK STRAIN, INITIAL ENCOUNTER: ICD-10-CM

## 2017-08-18 DIAGNOSIS — G47.9 SLEEP DISORDER: ICD-10-CM

## 2017-08-18 DIAGNOSIS — E13.49 OTHER DIABETIC NEUROLOGICAL COMPLICATION ASSOCIATED WITH OTHER SPECIFIED DIABETES MELLITUS (HCC): ICD-10-CM

## 2017-08-18 DIAGNOSIS — M54.2 NECK PAIN: ICD-10-CM

## 2017-08-18 NOTE — MR AVS SNAPSHOT
Visit Information Date & Time Provider Department Dept. Phone Encounter #  
 8/18/2017  3:30 PM Sofia Markham, 27 [facility] [phone] [id number] Follow-up Instructions Return if symptoms worsen or fail to improve. Follow-up and Disposition History Your Appointments 1/12/2018  2:15 PM  
ROUTINE CARE with Yovana Samuels MD  
[facility] ([facility]) Appt Note: 5 month followup [address] [facility] [address]  
[address] [facility] [address] [facility] [address] Upcoming Health Maintenance Date Due INFLUENZA AGE 9 TO ADULT 8/1/2017 PAP AKA CERVICAL CYTOLOGY 8/21/2018 DTaP/Tdap/Td series (2 - Td) 10/25/2026 Allergies as of 8/18/2017  Review Complete On: 8/18/2017 By: Sofia Markham MD  
  
 Severity Noted Reaction Type Reactions Ace Inhibitors  09/26/2012    Swelling Current Immunizations  Reviewed on 10/25/2016 Name Date Influenza Vaccine (Quad) PF 10/25/2016 Influenza Vaccine PF 12/11/2013 Pneumococcal Polysaccharide (PPSV-23) 10/25/2016 Tdap 10/25/2016 Not reviewed this visit You Were Diagnosed With   
  
 Codes Comments Bilateral carpal tunnel syndrome    -  Primary ICD-10-CM: G56.03 
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 
ICD-9-CM: 249.60 Neck pain     ICD-10-CM: M54.2 ICD-9-CM: 723.1 Acute midline thoracic back pain     ICD-10-CM: M54.6 ICD-9-CM: 724.1 Cervical strain, initial encounter     ICD-10-CM: S16. Pavithra Jha ICD-9-CM: 847.0 Back strain, initial encounter     ICD-10-CM: S39.012A ICD-9-CM: 847.9 Sleep disorder     ICD-10-CM: G47.9 ICD-9-CM: 780.50 Vitals BP Pulse Temp Resp Height(growth percentile) Weight(growth percentile) 148/83 84 99.5 °F (37.5 °C) (Oral) 18 5' 3\" (1.6 m) 239 lb 12.8 oz (108.8 kg) SpO2 BMI OB Status Smoking Status 96% 42.48 kg/m2 Having regular periods Current Every Day Smoker BMI and BSA Data Body Mass Index Body Surface Area  
 42.48 kg/m 2 2.2 m 2 Preferred Pharmacy Pharmacy Name Phone [facility] [address] [phone] Your Updated Medication List  
  
   
This list is accurate as of: 8/18/17  4:33 PM.  Always use your most recent med list.  
  
  
  
  
 albuterol 90 mcg/actuation inhaler Commonly known as:  Tinjosé Russo Take 2 Puffs by inhalation every four (4) hours as needed for Wheezing. amLODIPine 10 mg tablet Commonly known as:  Maurice Barges Take 1 Tab by mouth daily. atorvastatin 80 mg tablet Commonly known as:  LIPITOR Take 1 Tab by mouth daily. Blood-Glucose Meter monitoring kit Commonly known as:  FREESTYLE LITE METER Check fasting glucose once daily buPROPion  mg tablet Commonly known as:  Kirke Versailles Take 150 mg by mouth every morning. butalbital-acetaminophen-caff -40 mg per capsule Commonly known as:  [employer] Take 1 tablet every 8 hours as needed for headache  
  
 carvedilol 25 mg tablet Commonly known as:  Wendall Lundborg Take 1 Tab by mouth two (2) times daily (with meals). cloNIDine HCl 0.1 mg tablet Commonly known as:  CATAPRES Take 1 Tab by mouth two (2) times a day. diclofenac EC 75 mg EC tablet Commonly known as:  VOLTAREN Take 1 Tab by mouth two (2) times daily as needed. ferrous sulfate 325 mg (65 mg iron) tablet TAKE ONE TABLET BY MOUTH ONCE DAILY BEFORE BREAKFAST  
  
 gabapentin 400 mg capsule Commonly known as:  NEURONTIN  
1 three times a day GARLIC PO Take  by mouth. glucose blood VI test strips strip Commonly known as:  FREESTYLE LITE STRIPS Check fasting glucose once daily Lancets Misc Check fasting glucose once daily MAGNESIUM-CALCIUM-FOLIC ACID PO Take  by mouth.  
  
 metFORMIN 1,000 mg tablet Commonly known as:  GLUCOPHAGE Take 1 Tab by mouth two (2) times daily (with meals). sertraline 50 mg tablet Commonly known as:  ZOLOFT Take 75 mg by mouth nightly. VITAMIN D3 2,000 unit Tab Generic drug:  cholecalciferol (vitamin D3) Take  by mouth. We Performed the Following AMB POC XRAY, SPINE, CERVICAL; 2 OR 3 [82671 CPT(R)] REFERRAL TO PHYSICAL THERAPY [TSB24 Custom] Comments:  
 Modalities, ROM and strengthening, eval and treat X 10 sessions, 2-3 times per week. Neck and back REFERRAL TO SLEEP STUDIES [REF99 Custom] REFERRAL TO SPINE SURGERY [ZKP564 Custom] Follow-up Instructions Return if symptoms worsen or fail to improve. Referral Information Referral ID Referred By Referred To  
  
 [id number] Dorcas Later Not Available Visits Status Start Date End Date 1 New Request 8/18/17 8/18/18 If your referral has a status of pending review or denied, additional information will be sent to support the outcome of this decision. Referral ID Referred By Referred To  
 [id number] [facility]  
   [address] [facility], [address] [place] [address] Street Phone: [phone] Fax: [phone] Visits Status Start Date End Date 1 New Request 8/18/17 8/18/18 If your referral has a status of pending review or denied, additional information will be sent to support the outcome of this decision. Referral ID Referred By Referred To  
 [id number] TRACY STERN [facility]  
   [address] [facility]  
   [address] [place] [address] Phone: [phone] Fax: [phone] Visits Status Start Date End Date 1 New Request 8/18/17 8/18/18  If your referral has a status of pending review or denied, additional information will be sent to support the outcome of this decision. Patient Instructions Learning About How to Have a Healthy Back What causes back pain? Back pain is often caused by overuse, strain, or injury. For example, people often hurt their backs playing sports or working in the yard, being jolted in a car accident, or lifting something too heavy. Aging plays a part too. Your bones and muscles tend to lose strength as you age, which makes injury more likely. The spongy discs between the bones of the spine (vertebrae) may suffer from wear and tear and no longer provide enough cushion between the bones. A disc that bulges or breaks open (herniated disc) can press on nerves, causing back pain. In some people, back pain is the result of arthritis, broken vertebrae caused by bone loss (osteoporosis), illness, or a spine problem. Although most people have back pain at one time or another, there are steps you can take to make it less likely. How can you have a healthy back? Reduce stress on your back through good posture Slumping or slouching alone may not cause low back pain. But after the back has been strained or injured, bad posture can make pain worse. · Sleep in a position that maintains your back's normal curves and on a mattress that feels comfortable. Sleep on your side with a pillow between your knees, or sleep on your back with a pillow under your knees. These positions can reduce strain on your back. · Stand and sit up straight. \"Good posture\" generally means your ears, shoulders, and hips are in a straight line. · If you must stand for a long time, put one foot on a stool, ledge, or box. Switch feet every now and then. · Sit in a chair that is low enough to let you place both feet flat on the floor with both knees nearly level with your hips. If your chair or desk is too high, use a footrest to raise your knees.  Place a small pillow, a rolled-up towel, or a lumbar roll in the curve of your back if you need extra support. · Try a kneeling chair, which helps tilt your hips forward. This takes pressure off your lower back. · Try sitting on an exercise ball. It can rock from side to side, which helps keep your back loose. · When driving, keep your knees nearly level with your hips. Sit straight, and drive with both hands on the steering wheel. Your arms should be in a slightly bent position. Reduce stress on your back through careful lifting · Squat down, bending at the hips and knees only. If you need to, put one knee to the floor and extend your other knee in front of you, bent at a right angle (half kneeling). · Press your chest straight forward. This helps keep your upper back straight while keeping a slight arch in your low back. · Hold the load as close to your body as possible, at the level of your belly button (navel). · Use your feet to change direction, taking small steps. · Lead with your hips as you change direction. Keep your shoulders in line with your hips as you move. · Set down your load carefully, squatting with your knees and hips only. Exercise and stretch your back · Do some exercise on most days of the week, if your doctor says it is okay. You can walk, run, swim, or cycle. · Stretch your back muscles. Here are a few exercises to try: ¨ Lie on your back, and gently pull one bent knee to your chest. Put that foot back on the floor, and then pull the other knee to your chest. 
¨ Do pelvic tilts. Lie on your back with your knees bent. Tighten your stomach muscles. Pull your belly button (navel) in and up toward your ribs. You should feel like your back is pressing to the floor and your hips and pelvis are slightly lifting off the floor. Hold for 6 seconds while breathing smoothly. ¨ Sit with your back flat against a wall. · Keep your core muscles strong.  The muscles of your back, belly (abdomen), and buttocks support your spine. ¨ Pull in your belly and imagine pulling your navel toward your spine. Hold this for 6 seconds, then relax. Remember to keep breathing normally as you tense your muscles. ¨ Do curl-ups. Always do them with your knees bent. Keep your low back on the floor, and curl your shoulders toward your knees using a smooth, slow motion. Keep your arms folded across your chest. If this bothers your neck, try putting your hands behind your neck (not your head), with your elbows spread apart. ¨ Lie on your back with your knees bent and your feet flat on the floor. Tighten your belly muscles, and then push with your feet and raise your buttocks up a few inches. Hold this position 6 seconds as you continue to breathe normally, then lower yourself slowly to the floor. Repeat 8 to 12 times. ¨ If you like group exercise, try Pilates or yoga. These classes have poses that strengthen the core muscles. Lead a healthy lifestyle · Stay at a healthy weight to avoid strain on your back. · Do not smoke. Smoking increases the risk of osteoporosis, which weakens the spine. If you need help quitting, talk to your doctor about stop-smoking programs and medicines. These can increase your chances of quitting for good. Where can you learn more? Go to http://juan ramon-chuy.info/. Enter L315 in the search box to learn more about \"Learning About How to Have a Healthy Back. \" Current as of: March 21, 2017 Content Version: 11.3 © 5065-8757 Healthwise, Incorporated. Care instructions adapted under license by [employer] (which disclaims liability or warranty for this information). If you have questions about a medical condition or this instruction, always ask your healthcare professional. [place] [address] any warranty or liability for your use of this information. Patient Instructions History Introducing [facility]! Dear Sabrina Molina: Thank you for requesting a [facility] account. Our records indicate that you already have an active [facility] account. You can access your account anytime at [email or web address] Did you know that you can access your hospital and ER discharge instructions at any time in [facility]? You can also review all of your test results from your hospital stay or ER visit. Additional Information If you have questions, please visit the Frequently Asked Questions section of the [facility] website at [email or web address] Remember, [facility] is NOT to be used for urgent needs. For medical emergencies, dial 911. Now available from your iPhone and Android! Please provide this summary of care documentation to your next provider. Your primary care clinician is listed as Lilliana Birmingham. If you have any questions after today's visit, please call [phone].

## 2017-08-18 NOTE — PATIENT INSTRUCTIONS
Learning About How to Have a Healthy Back  What causes back pain? Back pain is often caused by overuse, strain, or injury. For example, people often hurt their backs playing sports or working in the yard, being jolted in a car accident, or lifting something too heavy. Aging plays a part too. Your bones and muscles tend to lose strength as you age, which makes injury more likely. The spongy discs between the bones of the spine (vertebrae) may suffer from wear and tear and no longer provide enough cushion between the bones. A disc that bulges or breaks open (herniated disc) can press on nerves, causing back pain. In some people, back pain is the result of arthritis, broken vertebrae caused by bone loss (osteoporosis), illness, or a spine problem. Although most people have back pain at one time or another, there are steps you can take to make it less likely. How can you have a healthy back? Reduce stress on your back through good posture  Slumping or slouching alone may not cause low back pain. But after the back has been strained or injured, bad posture can make pain worse. · Sleep in a position that maintains your back's normal curves and on a mattress that feels comfortable. Sleep on your side with a pillow between your knees, or sleep on your back with a pillow under your knees. These positions can reduce strain on your back. · Stand and sit up straight. \"Good posture\" generally means your ears, shoulders, and hips are in a straight line. · If you must stand for a long time, put one foot on a stool, ledge, or box. Switch feet every now and then. · Sit in a chair that is low enough to let you place both feet flat on the floor with both knees nearly level with your hips. If your chair or desk is too high, use a footrest to raise your knees. Place a small pillow, a rolled-up towel, or a lumbar roll in the curve of your back if you need extra support.   · Try a kneeling chair, which helps tilt your hips forward. This takes pressure off your lower back. · Try sitting on an exercise ball. It can rock from side to side, which helps keep your back loose. · When driving, keep your knees nearly level with your hips. Sit straight, and drive with both hands on the steering wheel. Your arms should be in a slightly bent position. Reduce stress on your back through careful lifting  · Squat down, bending at the hips and knees only. If you need to, put one knee to the floor and extend your other knee in front of you, bent at a right angle (half kneeling). · Press your chest straight forward. This helps keep your upper back straight while keeping a slight arch in your low back. · Hold the load as close to your body as possible, at the level of your belly button (navel). · Use your feet to change direction, taking small steps. · Lead with your hips as you change direction. Keep your shoulders in line with your hips as you move. · Set down your load carefully, squatting with your knees and hips only. Exercise and stretch your back  · Do some exercise on most days of the week, if your doctor says it is okay. You can walk, run, swim, or cycle. · Stretch your back muscles. Here are a few exercises to try:  Christen Loges on your back, and gently pull one bent knee to your chest. Put that foot back on the floor, and then pull the other knee to your chest.  ¨ Do pelvic tilts. Lie on your back with your knees bent. Tighten your stomach muscles. Pull your belly button (navel) in and up toward your ribs. You should feel like your back is pressing to the floor and your hips and pelvis are slightly lifting off the floor. Hold for 6 seconds while breathing smoothly. ¨ Sit with your back flat against a wall. · Keep your core muscles strong. The muscles of your back, belly (abdomen), and buttocks support your spine. ¨ Pull in your belly and imagine pulling your navel toward your spine. Hold this for 6 seconds, then relax.  Remember to keep breathing normally as you tense your muscles. ¨ Do curl-ups. Always do them with your knees bent. Keep your low back on the floor, and curl your shoulders toward your knees using a smooth, slow motion. Keep your arms folded across your chest. If this bothers your neck, try putting your hands behind your neck (not your head), with your elbows spread apart. ¨ Lie on your back with your knees bent and your feet flat on the floor. Tighten your belly muscles, and then push with your feet and raise your buttocks up a few inches. Hold this position 6 seconds as you continue to breathe normally, then lower yourself slowly to the floor. Repeat 8 to 12 times. ¨ If you like group exercise, try Pilates or yoga. These classes have poses that strengthen the core muscles. Lead a healthy lifestyle  · Stay at a healthy weight to avoid strain on your back. · Do not smoke. Smoking increases the risk of osteoporosis, which weakens the spine. If you need help quitting, talk to your doctor about stop-smoking programs and medicines. These can increase your chances of quitting for good. Where can you learn more? Go to http://juan ramon-chuy.info/. Enter L315 in the search box to learn more about \"Learning About How to Have a Healthy Back. \"  Current as of: March 21, 2017  Content Version: 11.3  © 2572-9586 [employer]. Care instructions adapted under license by [employer] (which disclaims liability or warranty for this information). If you have questions about a medical condition or this instruction, always ask your healthcare professional. [place] [address] any warranty or liability for your use of this information.
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
## 2017-08-23 ENCOUNTER — HOSPITAL ENCOUNTER (OUTPATIENT)
Dept: GENERAL RADIOLOGY | Age: 42
Discharge: HOME | End: 2017-08-23
Payer: MEDICAID

## 2017-08-23 ENCOUNTER — HOSPITAL ENCOUNTER (OUTPATIENT)
Dept: PHYSICAL THERAPY | Age: 42
Discharge: HOME | End: 2017-08-23
Payer: MEDICAID

## 2017-08-23 ENCOUNTER — OFFICE VISIT (OUTPATIENT)
Dept: ORTHOPEDIC SURGERY | Age: 42
End: 2017-08-23

## 2017-08-23 VITALS
WEIGHT: 239 LBS | HEIGHT: 63 IN | SYSTOLIC BLOOD PRESSURE: 122 MMHG | DIASTOLIC BLOOD PRESSURE: 78 MMHG | HEART RATE: 83 BPM | RESPIRATION RATE: 18 BRPM | BODY MASS INDEX: 42.35 KG/M2 | TEMPERATURE: 98.9 F

## 2017-08-23 DIAGNOSIS — R07.81 RIB PAIN: ICD-10-CM

## 2017-08-23 DIAGNOSIS — M79.642 LEFT HAND PAIN: ICD-10-CM

## 2017-08-23 DIAGNOSIS — S13.4XXA WHIPLASH INJURY SYNDROME, INITIAL ENCOUNTER: Primary | ICD-10-CM

## 2017-08-23 PROCEDURE — 97162 PT EVAL MOD COMPLEX 30 MIN: CPT

## 2017-08-23 PROCEDURE — 97110 THERAPEUTIC EXERCISES: CPT

## 2017-08-23 NOTE — PROGRESS NOTES
PT DAILY TREATMENT NOTE     Patient Name: Tess Maloney  Date:2017  : 1975  [x]  Patient  Verified  Payor: [place]ICAID [place] / Plan: 1500 / Product Type: Medicaid /    In time:4:35  Out time:5:13  Total Treatment Time (min): 38  Visit #: 1 of 8    Treatment Area: Cervicalgia [M54.2]  Pain in thoracic spine [M54.6]    SUBJECTIVE  Pain Level (0-10 scale): 7  Any medication changes, allergies to medications, adverse drug reactions, diagnosis change, or new procedure performed?: [x] No    [] Yes (see summary sheet for update)  Subjective functional status/changes:   [] No changes reported       OBJECTIVE    Modality rationale:    Min Type Additional Details    [] Estim:  []Unatt       []IFC  []Premod                        []Other:  []w/ice   []w/heat  Position:  Location:    [] Estim: []Att    []TENS instruct  []NMES                    []Other:  []w/US   []w/ice   []w/heat  Position:  Location:    []  Traction: [] Cervical       []Lumbar                       [] Prone          []Supine                       []Intermittent   []Continuous Lbs:  [] before manual  [] after manual    []  Ultrasound: []Continuous   [] Pulsed                           []1MHz   []3MHz W/cm2:  Location:    []  Iontophoresis with dexamethasone         Location: [] Take home patch   [] In clinic    []  Ice     []  heat  []  Ice massage  []  Laser   []  Anodyne Position:  Location:    []  Laser with stim  []  Other:  Position:  Location:    []  Vasopneumatic Device Pressure:       [] lo [] med [] hi   Temperature: [] lo [] med [] hi   [] Skin assessment post-treatment:  []intact []redness- no adverse reaction    []redness  adverse reaction:     28 min [x]Eval                  []Re-Eval       10 min Therapeutic Exercise:  [] See flow sheet : HEP   Rationale: increase ROM and increase strength to improve the patients ability to perform daily tasks              With   [] TE   [] TA   [] neuro   [] other: Patient Education: [x] Review HEP    [] Progressed/Changed HEP based on:   [] positioning   [] body mechanics   [] transfers   [] heat/ice application    [] other:      Other Objective/Functional Measures:       Pain Level (0-10 scale) post treatment: 7    ASSESSMENT/Changes in Function:      Patient will continue to benefit from skilled PT services to modify and progress therapeutic interventions, address functional mobility deficits, address ROM deficits, address strength deficits, analyze and address soft tissue restrictions, analyze and cue movement patterns, analyze and modify body mechanics/ergonomics and assess and modify postural abnormalities to attain remaining goals.      [x]  See Plan of Care  []  See progress note/recertification  []  See Discharge Summary         Progress towards goals / Updated goals:  Per POC    PLAN  []  Upgrade activities as tolerated     [x]  Continue plan of care  []  Update interventions per flow sheet       []  Discharge due to:_  []  Other:_      Ebony Rodriguez, PT 8/23/2017  5:13 PM    Future Appointments  Date Time Provider Ras Keely   8/28/2017 3:30 PM [facility]   8/30/2017 4:30 PM [facility]   9/6/2017 5:30 PM Rancho Valerio, PTA [facility]   9/8/2017 6:00 PM Ebony Rodriguez, PT [facility]   9/11/2017 5:00 PM Rancho Valerio, PTA [facility]   9/13/2017 5:30 PM Rancho Valerio, PTA [facility]   9/20/2017 6:00 PM [facility]   9/27/2017 2:15 PM Gala Miller  [address]    1/12/2018 2:15 PM Sapna Orr MD [place] [id number]
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
## 2017-08-28 ENCOUNTER — APPOINTMENT (OUTPATIENT)
Dept: PHYSICAL THERAPY | Age: 42
End: 2017-08-28
Payer: MEDICAID

## 2017-08-30 ENCOUNTER — APPOINTMENT (OUTPATIENT)
Dept: PHYSICAL THERAPY | Age: 42
End: 2017-08-30
Payer: MEDICAID

## 2017-09-06 ENCOUNTER — HOSPITAL ENCOUNTER (OUTPATIENT)
Dept: PHYSICAL THERAPY | Age: 42
Discharge: HOME | End: 2017-09-06
Payer: SELF-PAY

## 2017-09-06 PROCEDURE — 97110 THERAPEUTIC EXERCISES: CPT

## 2017-09-06 NOTE — PROGRESS NOTES
PT DAILY TREATMENT NOTE     Patient Name: Maria Elena Mention  Date:2017  : 1975   [x]  Patient  Verified  Payor: [place]ICA[place] / Plan: [id number]  / Product Type: Medicaid /    In time:5:40  Out time:6:24  Total Treatment Time (min): 44  Visit #: 2 of 8    Treatment Area: Cervicalgia [M54.2]  Pain in thoracic spine [M54.6]    SUBJECTIVE  Pain Level (0-10 scale): 410  Any medication changes, allergies to medications, adverse drug reactions, diagnosis change, or new procedure performed?: [x] No    [] Yes (see summary sheet for update)  Subjective functional status/changes:   [] No changes reported  \"Home exercises are okay. \"    OBJECTIVE     Modality rationale: decrease pain to improve the patients ability to perform ADL's.    Min Type Additional Details    [] Estim:  []Unatt       []IFC  []Premod                        []Other:  []w/ice   []w/heat  Position:  Location:    [] Estim: []Att    []TENS instruct  []NMES                    []Other:  []w/US   []w/ice   []w/heat  Position:  Location:    []  Traction: [] Cervical       []Lumbar                       [] Prone          []Supine                       []Intermittent   []Continuous Lbs:  [] before manual  [] after manual    []  Ultrasound: []Continuous   [] Pulsed                           []1MHz   []3MHz W/cm2:  Location:    []  Iontophoresis with dexamethasone         Location: [] Take home patch   [] In clinic   10 [x]  Ice     []  heat  []  Ice massage  []  Laser   []  Anodyne Position: Seated  Location: (R) Shoulder    []  Laser with stim  []  Other:  Position:  Location:    []  Vasopneumatic Device Pressure:       [] lo [] med [] hi   Temperature: [] lo [] med [] hi   [] Skin assessment post-treatment:  []intact []redness- no adverse reaction    []redness  adverse reaction:     34 min Therapeutic Exercise:  [x] See flow sheet :   Rationale: increase ROM, increase strength and increase proprioception to improve the patients ability to perform ADL's. With   [x] TE   [] TA   [] neuro   [] other: Patient Education: [x] Review HEP    [] Progressed/Changed HEP based on:   [] positioning   [] body mechanics   [] transfers   [] heat/ice application    [] other:      Other Objective/Functional Measures: Initiated exercises per flow sheet. Pain Level (0-10 scale) post treatment: 1/10    ASSESSMENT/Changes in Function: First F/U visit. Pt reported a decrease in pain level after treatment. Patient will continue to benefit from skilled PT services to modify and progress therapeutic interventions, address functional mobility deficits, address ROM deficits, address strength deficits, analyze and cue movement patterns and analyze and modify body mechanics/ergonomics to attain remaining goals. [x]  See Plan of Care  []  See progress note/recertification  []  See Discharge Summary         Progress towards goals / Updated goals:  Short Term Goals: To be accomplished in 1 weeks:                         1. Pt will be I with HEP - Pt reports HEP compliance. 9/6/2017  Long Term Goals: To be accomplished in 4 weeks:                         1. Improve FOTO score to 55 for improved ability for functional tasks                         2. Pt will demonstrate 60 degrees c/s rotation B for improved ability for driving                         3. Pt will demonstrate ability to walk more than a block for improved ability for daily taks                         4. Pt will demonstrate 120 degrees B shoulder flexion for improved ability for overhead tasks.      Frequency / Duration: Patient to be seen 2 times per week for 4 weeks.     PLAN  []  Upgrade activities as tolerated     [x]  Continue plan of care  []  Update interventions per flow sheet       []  Discharge due to:_  []  Other:_      Dav Crockett PTA 9/6/2017  5:57 PM    Future Appointments  Date Time Provider Ras Riggs   9/8/2017 6:00 PM Dominick Hartman, PT [facility] 9/11/2017 5:00 PM Anni Azevedo, PTA [facility]   9/13/2017 5:30 PM Anni Azevedo PTA [facility]   9/20/2017 6:00 PM Nalini Hou, PT [facility]   9/27/2017 2:15 PM Sil Redding  [address]   10/5/2017 2:30 PM Sheri Randle RD [facility]   1/12/2018 2:15 PM Perla Cuevas MD [place] [id number]

## 2017-09-08 ENCOUNTER — HOSPITAL ENCOUNTER (OUTPATIENT)
Dept: PHYSICAL THERAPY | Age: 42
Discharge: HOME | End: 2017-09-08
Payer: SELF-PAY

## 2017-09-08 PROCEDURE — 97112 NEUROMUSCULAR REEDUCATION: CPT

## 2017-09-08 PROCEDURE — 97110 THERAPEUTIC EXERCISES: CPT

## 2017-09-08 NOTE — PROGRESS NOTES
PT DAILY TREATMENT NOTE     Patient Name: Maria Elena Mention  Date:2017  : 1975   [x]  Patient  Verified  Payor: [place] / Plan: 1500  / Product Type: Medicaid /    In time:5:55  Out time:6:40  Total Treatment Time (min): 45  Visit #: 3 of 8    Treatment Area: Cervicalgia [M54.2]  Pain in thoracic spine [M54.6]    SUBJECTIVE  Pain Level (0-10 scale): 4-5/10  Any medication changes, allergies to medications, adverse drug reactions, diagnosis change, or new procedure performed?: [x] No    [] Yes (see summary sheet for update)  Subjective functional status/changes:   [] No changes reported  Pt reports she felt good after last session but then helped a friend some with moving and had increase in pain again. OBJECTIVE     37 min Therapeutic Exercise:  [x] See flow sheet :   Rationale: increase ROM, increase strength and increase proprioception to improve the patients ability to perform ADL's.  8 min Neuromuscular Re-education:  [x]  See flow sheet :   Rationale: increase strength and increase proprioception  to improve the patients ability to perform ADL     With   [x] TE   [] TA   [] neuro   [] other: Patient Education: [x] Review HEP    [] Progressed/Changed HEP based on:   [] positioning   [] body mechanics   [] transfers   [] heat/ice application    [] other:      Other Objective/Functional Measures: added towel c/s rotation     Pain Level (0-10 scale) post treatment: 3/10    ASSESSMENT/Changes in Function:  Pt tolerated tx well and gained relief following therex. C/S rotation improved following c/s rotation with the towel. Patient will continue to benefit from skilled PT services to modify and progress therapeutic interventions, address functional mobility deficits, address ROM deficits, address strength deficits, analyze and cue movement patterns and analyze and modify body mechanics/ergonomics to attain remaining goals.      [x]  See Plan of Care  [] See progress note/recertification  []  See Discharge Summary         Progress towards goals / Updated goals:  Short Term Goals: To be accomplished in 1 weeks:                         1. Pt will be I with HEP - Pt reports HEP compliance. 9/6/2017  Long Term Goals: To be accomplished in 4 weeks:                         1. Improve FOTO score to 55 for improved ability for functional tasks                         2. Pt will demonstrate 60 degrees c/s rotation B for improved ability for driving- progressing 9-8-17                         3. Pt will demonstrate ability to walk more than a block for improved ability for daily taks                         4. Pt will demonstrate 120 degrees B shoulder flexion for improved ability for overhead tasks.      Frequency / Duration: Patient to be seen 2 times per week for 4 weeks.     PLAN  []  Upgrade activities as tolerated     [x]  Continue plan of care  []  Update interventions per flow sheet       []  Discharge due to:_  []  Other:_      Erna Bay, PT 9/8/2017  6:29 PM    Future Appointments  Date Time Provider Ras Riggs   9/11/2017 5:00 [facility]   9/13/2017 5:30 PM Clovis Escobar PTA [facility]   9/20/2017 6:00 PM [facility]   9/27/2017 2:15 PM Chong Mg  [address]    10/5/2017 2:30 PM Cassie Clark RD [facility]   1/12/2018 2:15 PM Naya Pinzon MD [place] [address]

## 2017-09-11 ENCOUNTER — HOSPITAL ENCOUNTER (OUTPATIENT)
Dept: PHYSICAL THERAPY | Age: 42
Discharge: HOME | End: 2017-09-11
Payer: SELF-PAY

## 2017-09-11 PROCEDURE — 97110 THERAPEUTIC EXERCISES: CPT

## 2017-09-11 PROCEDURE — 97112 NEUROMUSCULAR REEDUCATION: CPT

## 2017-09-11 NOTE — PROGRESS NOTES
PT DAILY TREATMENT NOTE     Patient Name: Roderick Goodwin  Date:2017  : 1975  [x]  Patient  Verified  Pay[place]: [place] / Plan: 1500  / Product Type: Medicaid /    In time:5:11  Out time:5:51  Total Treatment Time (min): 40  Visit #: 4 of 8    Treatment Area: Cervicalgia [M54.2]  Pain in thoracic spine [M54.6]    SUBJECTIVE  Pain Level (0-10 scale): 310  Any medication changes, allergies to medications, adverse drug reactions, diagnosis change, or new procedure performed?: [x] No    [] Yes (see summary sheet for update)  Subjective functional status/changes:   [] No changes reported  Pt late for appointment. OBJECTIVE    32 min Therapeutic Exercise:  [x] See flow sheet :   Rationale: increase ROM and increase strength to improve the patients ability to perform ADL's.     8 min Neuromuscular Re-education:  [x]  See flow sheet :   Rationale: increase strength and increase proprioception  to improve the patients ability to perform functional activities. With   [x] TE   [] TA   [] neuro   [] other: Patient Education: [x] Review HEP    [] Progressed/Changed HEP based on:   [] positioning   [] body mechanics   [] transfers   [] heat/ice application    [] other:      Other Objective/Functional Measures: No changes in there ex. Pain Level (0-10 scale) post treatment: 3/10    ASSESSMENT/Changes in Function: Pt reports discomfort/tension anterior shoulders around pec region. No changes in pain level per pt. Patient will continue to benefit from skilled PT services to modify and progress therapeutic interventions, address functional mobility deficits, address ROM deficits, address strength deficits, analyze and cue movement patterns and analyze and modify body mechanics/ergonomics to attain remaining goals.      [x]  See Plan of Care  []  See progress note/recertification  []  See Discharge Summary         Progress towards goals / Updated goals:  Short Term Goals: To be accomplished in [facility]:                         1. Pt will be I with HEP - Pt reports HEP compliance. 9/6/2017  Long Term Goals: To be accomplished in 4 weeks:                         5. Improve FOTO score to 55 for improved ability for functional tasks                         2. Pt will demonstrate 60 degrees c/s rotation B for improved ability for driving- progressing 9-8-17                         3. Pt will demonstrate ability to walk more than a block for improved ability for daily taks                         4. Pt will demonstrate 120 degrees B shoulder flexion for improved ability for overhead tasks.       Frequency / Duration: Patient to be seen 2 times per week for 4 weeks.     PLAN  []  Upgrade activities as tolerated     [x]  Continue plan of care  []  Update interventions per flow sheet       []  Discharge due to:_  []  Other:_      Diana Donahue PTA 9/11/2017  6:01 PM    Future Appointments  Date Time Provider Ras Riggs   9/13[facility]2017 5:30 PM [facility]   9/20/2017 6:00 PM Cristobal Thakur PT [facility]   9/27/2017 2:15 PM Vance Mckinnon  [address]   10/5/2017 2:30 PM Marcina Barthel, RD [facility]   1/12/2018 2:15 PM Zaid Santo MD [place] [address]

## 2017-09-27 ENCOUNTER — OFFICE VISIT (OUTPATIENT)
Dept: ORTHOPEDIC SURGERY | Age: 42
End: 2017-09-27

## 2017-09-27 ENCOUNTER — DOCUMENTATION ONLY (OUTPATIENT)
Dept: ORTHOPEDIC SURGERY | Age: 42
End: 2017-09-27

## 2017-09-27 VITALS
SYSTOLIC BLOOD PRESSURE: 160 MMHG | BODY MASS INDEX: 43.3 KG/M2 | HEIGHT: 63 IN | TEMPERATURE: 97.5 F | DIASTOLIC BLOOD PRESSURE: 96 MMHG | WEIGHT: 244.4 LBS | RESPIRATION RATE: 16 BRPM | HEART RATE: 69 BPM

## 2017-09-27 DIAGNOSIS — G56.01 CARPAL TUNNEL SYNDROME ON RIGHT: Primary | ICD-10-CM

## 2017-09-27 DIAGNOSIS — M79.18 MYOFASCIAL PAIN: Primary | ICD-10-CM

## 2017-09-27 DIAGNOSIS — Z01.812 BLOOD TESTS PRIOR TO TREATMENT OR PROCEDURE: ICD-10-CM

## 2017-09-27 DIAGNOSIS — Z01.810 PRE-OPERATIVE CARDIOVASCULAR EXAMINATION: ICD-10-CM

## 2017-09-27 DIAGNOSIS — G47.30 SLEEP APNEA IN ADULT: ICD-10-CM

## 2017-09-27 NOTE — MR AVS SNAPSHOT
Visit Information Date & Time Provider Department Dept. Phone Encounter #  
 9/27/2017  2:15 PM Esteban Dia MD [facility] [phone] [id number] Follow-up Instructions Return in about 4 weeks (around 10/25/2017), or if symptoms worsen or fail to improve. Routing History Follow-up and Disposition History Your Appointments 1/12/2018  2:15 PM  
ROUTINE CARE with Lilliana Birmingham MD  
[address] [facility] ([address]) Appt Note: 5 month followup [address]  
[address] [facility] [address] Upcoming Health Maintenance Date Due INFLUENZA AGE 9 TO ADULT 8/1/2017 PAP AKA CERVICAL CYTOLOGY 8/21/2018 DTaP/Tdap/Td series (2 - Td) 10/25/2026 Allergies as of 9/27/2017  Review Complete On: 9/27/2017 By: Esteban Dia MD  
  
 Severity Noted Reaction Type Reactions Ace Inhibitors  09/26/2012    Swelling Current Immunizations  Reviewed on 10/25/2016 Name Date Influenza Vaccine (Quad) PF 10/25/2016 Influenza Vaccine PF 12/11/2013 Pneumococcal Polysaccharide (PPSV-23) 10/25/2016 Tdap 10/25/2016 Not reviewed this visit You Were Diagnosed With   
  
 Codes Comments Myofascial pain    -  Primary ICD-10-CM: M79.1 ICD-9-CM: 729.1 Sleep apnea in adult     ICD-10-CM: G47.30 ICD-9-CM: 327.23 Vitals BP Pulse Temp Resp Height(growth percentile) Weight(growth percentile) (!) 160/96 69 97.5 °F (36.4 °C) (Oral) 16 5' 3\" (1.6 m) 244 lb 6.4 oz (110.9 kg) BMI OB Status Smoking Status 43.29 kg/m2 Having regular periods Current Every Day Smoker BMI and BSA Data Body Mass Index Body Surface Area  
 43.29 kg/m 2 2.22 m 2 Preferred Pharmacy Pharmacy Name Phone [id number] [phone] [phone] Your Updated Medication List  
  
   
This list is accurate as of: 9/27/17  3:32 PM.  Always use your most recent med list.  
  
  
  
  
 albuterol 90 mcg/actuation inhaler Commonly known as:  Bedelia Esha Take 2 Puffs by inhalation every four (4) hours as needed for Wheezing. amLODIPine 10 mg tablet Commonly known as:  Unknown [place] Take 1 Tab by mouth daily. atorvastatin 80 mg tablet Commonly known as:  LIPITOR Take 1 Tab by mouth daily. Blood-Glucose Meter monitoring kit Commonly known as:  FREESTYLE LITE METER Check fasting glucose once daily buPROPion  mg tablet Commonly known as:  Zoie Blow Take 150 mg by mouth every morning. butalbital-acetaminophen-caff -40 mg per capsule Commonly known as:  [employer] Take 1 tablet every 8 hours as needed for headache  
  
 carvedilol 25 mg tablet Commonly known as:  Manual [place] Take 1 Tab by mouth two (2) times daily (with meals). cloNIDine HCl 0.1 mg tablet Commonly known as:  CATAPRES Take 1 Tab by mouth two (2) times a day. diclofenac EC 75 mg EC tablet Commonly known as:  VOLTAREN Take 1 Tab by mouth two (2) times daily as needed. ferrous sulfate 325 mg (65 mg iron) tablet TAKE ONE TABLET BY MOUTH ONCE DAILY BEFORE BREAKFAST  
  
 gabapentin 400 mg capsule Commonly known as:  NEURONTIN  
1 three times a day GARLIC PO Take  by mouth. glucose blood VI test strips strip Commonly known as:  FREESTYLE LITE STRIPS Check fasting glucose once daily Lancets Misc Check fasting glucose once daily MAGNESIUM-CALCIUM-FOLIC ACID PO Take  by mouth.  
  
 metFORMIN 1,000 mg tablet Commonly known as:  GLUCOPHAGE Take 1 Tab by mouth two (2) times daily (with meals). sertraline 50 mg tablet Commonly known as:  ZOLOFT Take 75 mg by mouth nightly. VITAMIN D3 2,000 unit Tab Generic drug:  cholecalciferol (vitamin D3) Take  by mouth. We Performed the Following REFERRAL TO PULMONARY DISEASE [LUN80 Custom] Comments:  
 eval for JOSE Follow-up Instructions Return in about 4 weeks (around 10/25/2017), or if symptoms worsen or fail to improve. To-Do List   
 10/05/2017 2:30 PM  
  Appointment with Adry Parrish RD at [address] [place] [address] Referral Information Referral ID Referred By Referred To  
  
 [id number] [place], [address] [place] [address], [address] [place] [address], [address] [place] [address] Phone: [phone] Fax: [phone] Visits Status Start Date End Date 1 New Request 9/27/17 9/27/18 If your referral has a status of pending review or denied, additional information will be sent to support the outcome of this decision. Introducing [facility]! Dear Laly Herrera: Thank you for requesting a [employer] account. Our records indicate that you already have an active [employer] account. You can access your account anytime at [email or web address] Did you know that you can access your hospital and ER discharge instructions at any time in [employer]? You can also review all of your test results from your hospital stay or ER visit. Additional Information If you have questions, please visit the Frequently Asked Questions section of the [employer] website at [email or web address] Remember, [employer] is NOT to be used for urgent needs. For medical emergencies, dial 911. Now available from your iPhone and Android! Please provide this summary of care documentation to your next provider. Your primary care clinician is listed as Poncho Duke. If you have any questions after today's visit, please call [phone].
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
## 2017-10-02 ENCOUNTER — DOCUMENTATION ONLY (OUTPATIENT)
Dept: ORTHOPEDIC SURGERY | Age: 42
End: 2017-10-02
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
## 2017-10-02 NOTE — PROGRESS NOTES
Completed combined insurance of [place] disability form, Dr. Galdino Carson signed, given to [facility] for processing.

## 2017-10-05 ENCOUNTER — APPOINTMENT (OUTPATIENT)
Dept: NUTRITION | Age: 42
End: 2017-10-05

## 2017-10-19 ENCOUNTER — OFFICE VISIT (OUTPATIENT)
Dept: OBGYN CLINIC | Age: 42
End: 2017-10-19

## 2017-10-19 VITALS
SYSTOLIC BLOOD PRESSURE: 120 MMHG | HEART RATE: 81 BPM | WEIGHT: 238 LBS | HEIGHT: 63 IN | DIASTOLIC BLOOD PRESSURE: 77 MMHG | BODY MASS INDEX: 42.17 KG/M2

## 2017-10-19 DIAGNOSIS — N76.0 BV (BACTERIAL VAGINOSIS): Primary | ICD-10-CM

## 2017-10-19 DIAGNOSIS — N93.8 DUB (DYSFUNCTIONAL UTERINE BLEEDING): ICD-10-CM

## 2017-10-19 DIAGNOSIS — B96.89 BV (BACTERIAL VAGINOSIS): Primary | ICD-10-CM

## 2017-10-19 RX ORDER — METRONIDAZOLE 500 MG/1
500 TABLET ORAL 3 TIMES DAILY
Qty: 30 TAB | Refills: 4 | Status: SHIPPED | OUTPATIENT
Start: 2017-10-19 | End: 2017-10-29

## 2017-10-19 RX ORDER — NORETHINDRONE ACETATE AND ETHINYL ESTRADIOL 1.5-30(21)
1 KIT ORAL DAILY
Qty: 1 PACKAGE | Refills: 12 | Status: SHIPPED | OUTPATIENT
Start: 2017-10-19

## 2017-10-19 NOTE — PROGRESS NOTES
Progress Note    Patient: Keith Hall  MRN: [id number]  Date: 10/19/2017     Age:  38 y.o.,      Sex: female    YOB: 1975    Keith Hall is a 38y.o. year-old female, G 6 P 3  whose last normal menstrual period is unknown. She is not on any contraceptive. She presents today with complaints of abnormal vaginal bleeding, soaking a lot of sanitary pads monthly. She denies any dizziness or lightheadedness. She also admits to having an abnormal malodorous vaginal discharge x 1 week duration. Review of Systems: General, Cardiovascular, Respiratory, Gastrointestinal, Genitourinary, Neuropsychiatry, Musculoskeletal. Patient denies any problems associated with these systems except for the problems mentioned above. .    General Examination: She is a well-developed, well-nourished female in no acute distress. Abdomen--soft, nontender, and normal active. Pelvic exam--External genitalia and BUS--normal.             Cervix: Normal    Vagina: vaginal discharge white, frothy and malodorous                          Uterus: normal size, contour, position, consistency, mobility, non-tender    Adnexa: normal bimanual exam    Impression. --DUB. Vaginitis. Plan: ---Rx--Microgestin Fe 28.                 Rx--Metrogel vag cream.                RTC--przac Suazo MD  October 19, 2017

## 2017-10-19 NOTE — PATIENT INSTRUCTIONS
Abnormal Uterine Bleeding: Care Instructions  Your Care Instructions  Abnormal uterine bleeding (AUB) is irregular bleeding from the uterus that is longer or heavier than usual or does not occur at your regular time. Sometimes it is caused by changes in hormone levels. It can also be caused by growths in the uterus, such as fibroids or polyps. Sometimes a cause cannot be found. You may have heavy bleeding when you are not expecting your period. Your doctor may suggest a pregnancy test, if you think you are pregnant. Follow-up care is a key part of your treatment and safety. Be sure to make and go to all appointments, and call your doctor if you are having problems. It's also a good idea to know your test results and keep a list of the medicines you take. How can you care for yourself at home? · Be safe with medicines. Take pain medicines exactly as directed. ¨ If the doctor gave you a prescription medicine for pain, take it as prescribed. ¨ If you are not taking a prescription pain medicine, ask your doctor if you can take an over-the-counter medicine. · You may be low in iron because of blood loss. Eat a balanced diet that is high in iron and vitamin C. Foods rich in iron include red meat, shellfish, eggs, beans, and leafy green vegetables. Talk to your doctor about whether you need to take iron pills or a multivitamin. When should you call for help? Call 911 anytime you think you may need emergency care. For example, call if:  · You passed out (lost consciousness). Call your doctor now or seek immediate medical care if:  · You have sudden, severe pain in your belly or pelvis. · You have severe vaginal bleeding. You are soaking through your usual pads or tampons every hour for 2 or more hours. · You feel dizzy or lightheaded, or you feel like you may faint. Watch closely for changes in your health, and be sure to contact your doctor if:  · You have new belly or pelvic pain.   · You have a fever.  · Your bleeding gets worse or lasts longer than 1 week. · You think you may be pregnant. Where can you learn more? Go to http://juan ramon-chuy.info/. Enter U208 in the search box to learn more about \"Abnormal Uterine Bleeding: Care Instructions. \"  Current as of: October 13, 2016  Content Version: 11.3  © 6461-1849 [employer]. Care instructions adapted under license by [employer] (which disclaims liability or warranty for this information). If you have questions about a medical condition or this instruction, always ask your healthcare professional. [place] [address] any warranty or liability for your use of this information. Bacterial Vaginosis: Care Instructions  Your Care Instructions    Bacterial vaginosis is a type of vaginal infection. It is caused by excess growth of certain bacteria that are normally found in the vagina. Symptoms can include itching, swelling, pain when you urinate or have sex, and a gray or yellow discharge with a \"fishy\" odor. It is not considered an infection that is spread through sexual contact. Although symptoms can be annoying and uncomfortable, bacterial vaginosis does not usually cause other health problems. However, if you have it while you are pregnant, it can cause complications. While the infection may go away on its own, most doctors use antibiotics to treat it. You may have been prescribed pills or vaginal cream. With treatment, bacterial vaginosis usually clears up in 5 to 7 days. Follow-up care is a key part of your treatment and safety. Be sure to make and go to all appointments, and call your doctor if you are having problems. It's also a good idea to know your test results and keep a list of the medicines you take. How can you care for yourself at home? · Take your antibiotics as directed. Do not stop taking them just because you feel better.  You need to take the full course of antibiotics. · Do not eat or drink anything that contains alcohol if you are taking metronidazole (Flagyl). · Keep using your medicine if you start your period. Use pads instead of tampons while using a vaginal cream or suppository. Tampons can absorb the medicine. · Wear loose cotton clothing. Do not wear nylon and other materials that hold body heat and moisture close to the skin. · Do not scratch. Relieve itching with a cold pack or a cool bath. · Do not wash your vaginal area more than once a day. Use plain water or a mild, unscented soap. Do not douche. When should you call for help? Watch closely for changes in your health, and be sure to contact your doctor if:  · You have unexpected vaginal bleeding. · You have a fever. · You have new or increased pain in your vagina or pelvis. · You are not getting better after 1 week. · Your symptoms return after you finish the course of your medicine. Where can you learn more? Go to http://juan ramon-chuy.info/. Juan Pablo Abdi in the search box to learn more about \"Bacterial Vaginosis: Care Instructions. \"  Current as of: October 13, 2016  Content Version: 11.3  © 7529-8090 [employer]. Care instructions adapted under license by [employer] (which disclaims liability or warranty for this information). If you have questions about a medical condition or this instruction, always ask your healthcare professional. [place] [address] any warranty or liability for your use of this information.

## 2017-10-19 NOTE — MR AVS SNAPSHOT
Visit Information Date & Time Provider Department Dept. Phone Encounter #  
 10/19/2017 12:30 PM Elia Aguilar, [facility] [phone] [id number] Follow-up Instructions Return if symptoms worsen or fail to improve. Your Appointments 10/25/2017  2:30 PM  
Follow Up with Dru Abraham MD  
[facility] Appt Note: 4 wk fu  
 [address] [place] [address]  
[phone]  
  
   
 [address]  
  
    
 10/31/2017 12:00 PM  
PHYSICAL with Roldan Schroeder MD  
[address] ([facility]) Appt Note: [address] [facility] [address] [facility] [address]  
[address] [facility] [address] [facility] [address]  
  
    
 1/12/2018  2:15 PM  
ROUTINE CARE with Xochilt Soliman MD  
[facility] ([facility]) Appt Note: 5 month followup [address] [facility] [address]  
[address] [facility] [address] [facility] [address] Upcoming Health Maintenance Date Due INFLUENZA AGE 9 TO ADULT 8/1/2017 PAP AKA CERVICAL CYTOLOGY 8/21/2018 Allergies as of 10/19/2017  Review Complete On: 10/19/2017 By: Elia Aguilar MD  
  
 Severity Noted Reaction Type Reactions Ace Inhibitors  09/26/2012    Swelling Current Immunizations  Reviewed on 10/25/2016 Name Date Influenza Vaccine (Quad) PF 10/25/2016 Influenza Vaccine PF 12/11/2013 Pneumococcal Polysaccharide (PPSV-23) 10/25/2016 Tdap 10/25/2016 Not reviewed this visit You Were Diagnosed With   
  
 Codes Comments BV (bacterial vaginosis)    -  Primary ICD-10-CM: N76.0, B96.89 
ICD-9-CM: 616.10, 041.9 DUB (dysfunctional uterine bleeding)     ICD-10-CM: N93.8 ICD-9-CM: 626.8 Vitals BP Pulse Height(growth percentile) Weight(growth percentile) BMI OB Status 120/77 (BP 1 Location: Right arm, BP Patient Position: Sitting) 81 5' 3\" (1.6 m) 238 lb (108 kg) 42.16 kg/m2 Having regular periods Smoking Status Current Every Day Smoker BMI and BSA Data Body Mass Index Body Surface Area  
 42.16 kg/m 2 2.19 m 2 Preferred Pharmacy Pharmacy Name Phone [facility] [address] [phone] Your Updated Medication List  
  
   
This list is accurate as of: 10/19/17  3:25 PM.  Always use your most recent med list.  
  
  
  
  
 albuterol 90 mcg/actuation inhaler Commonly known as:  Chelly Melendez Take 2 Puffs by inhalation every four (4) hours as needed for Wheezing. amLODIPine 10 mg tablet Commonly known as:  Humphreys Reyes Take 1 Tab by mouth daily. atorvastatin 80 mg tablet Commonly known as:  LIPITOR Take 1 Tab by mouth daily. Blood-Glucose Meter monitoring kit Commonly known as:  FREESTYLE LITE METER Check fasting glucose once daily buPROPion  mg tablet Commonly known as:  Barron Crew Take 150 mg by mouth every morning. butalbital-acetaminophen-caff -40 mg per capsule Commonly known as:  [employer] Take 1 tablet every 8 hours as needed for headache  
  
 carvedilol 25 mg tablet Commonly known as:  Haskel Scarce Take 1 Tab by mouth two (2) times daily (with meals). cloNIDine HCl 0.1 mg tablet Commonly known as:  CATAPRES Take 1 Tab by mouth two (2) times a day. diclofenac EC 75 mg EC tablet Commonly known as:  VOLTAREN Take 1 Tab by mouth two (2) times daily as needed. ferrous sulfate 325 mg (65 mg iron) tablet TAKE ONE TABLET BY MOUTH ONCE DAILY BEFORE BREAKFAST  
  
 gabapentin 400 mg capsule Commonly known as:  NEURONTIN  
1 three times a day GARLIC PO Take  by mouth. glucose blood VI test strips strip Commonly known as:  FREESTYLE LITE STRIPS Check fasting glucose once daily Lancets Misc Check fasting glucose once daily MAGNESIUM-CALCIUM-FOLIC ACID PO Take  by mouth.  
  
 metFORMIN 1,000 mg tablet Commonly known as:  GLUCOPHAGE Take 1 Tab by mouth two (2) times daily (with meals). metroNIDAZOLE 500 mg tablet Commonly known as:  FLAGYL Take 1 Tab by mouth three (3) times daily for 10 days. norethindrone-ethinyl estradiol-iron 1.5 mg-30 mcg (21)/75 mg (7) Tab Commonly known as:  Bonnye Sunshine FE1.5/30 Take 1 Tab by mouth daily. Indications: Abnormal Uterine Bleeding  
  
 sertraline 50 mg tablet Commonly known as:  ZOLOFT Take 75 mg by mouth nightly. VITAMIN D3 2,000 unit Tab Generic drug:  cholecalciferol (vitamin D3) Take  by mouth. Prescriptions Sent to Pharmacy Refills  
 norethindrone-ethinyl estradiol-iron (MICROGESTIN FE1.5/30) 1.5 mg-30 mcg (21)/75 mg (7) tab 12 Sig: Take 1 Tab by mouth daily. Indications: Abnormal Uterine Bleeding Class: Normal  
 Pharmacy: [id number] [place] [address]. Ph #: [phone] Route: Oral  
 metroNIDAZOLE (FLAGYL) 500 mg tablet 4 Sig: Take 1 Tab by mouth three (3) times daily for 10 days. Class: Normal  
 Pharmacy: [id number] [place] [address]. Ph #: [phone] Route: Oral  
  
Follow-up Instructions Return if symptoms worsen or fail to improve. To-Do List   
 11/16/2017 1:00 PM  
  Appointment with Chuy Cantu RD at [address] [place] [address] Patient Instructions Abnormal Uterine Bleeding: Care Instructions Your Care Instructions Abnormal uterine bleeding (AUB) is irregular bleeding from the uterus that is longer or heavier than usual or does not occur at your regular time. Sometimes it is caused by changes in hormone levels.  It can also be caused by growths in the uterus, such as fibroids or polyps. Sometimes a cause cannot be found. You may have heavy bleeding when you are not expecting your period. Your doctor may suggest a pregnancy test, if you think you are pregnant. Follow-up care is a key part of your treatment and safety. Be sure to make and go to all appointments, and call your doctor if you are having problems. It's also a good idea to know your test results and keep a list of the medicines you take. How can you care for yourself at home? · Be safe with medicines. Take pain medicines exactly as directed. ¨ If the doctor gave you a prescription medicine for pain, take it as prescribed. ¨ If you are not taking a prescription pain medicine, ask your doctor if you can take an over-the-counter medicine. · You may be low in iron because of blood loss. Eat a balanced diet that is high in iron and vitamin C. Foods rich in iron include red meat, shellfish, eggs, beans, and leafy green vegetables. Talk to your doctor about whether you need to take iron pills or a multivitamin. When should you call for help? Call 911 anytime you think you may need emergency care. For example, call if: 
· You passed out (lost consciousness). Call your doctor now or seek immediate medical care if: 
· You have sudden, severe pain in your belly or pelvis. · You have severe vaginal bleeding. You are soaking through your usual pads or tampons every hour for 2 or more hours. · You feel dizzy or lightheaded, or you feel like you may faint. Watch closely for changes in your health, and be sure to contact your doctor if: 
· You have new belly or pelvic pain. · You have a fever. · Your bleeding gets worse or lasts longer than 1 week. · You think you may be pregnant. Where can you learn more? Go to http://juan ramon-chuy.info/. Enter K990 in the search box to learn more about \"Abnormal Uterine Bleeding: Care Instructions. \" 
 Current as of: October 13, 2016 Content Version: 11.3 © 3889-2183 [employer]. Care instructions adapted under license by [employer] (which disclaims liability or warranty for this information). If you have questions about a medical condition or this instruction, always ask your healthcare professional. [address] any warranty or liability for your use of this information. Bacterial Vaginosis: Care Instructions Your Care Instructions Bacterial vaginosis is a type of vaginal infection. It is caused by excess growth of certain bacteria that are normally found in the vagina. Symptoms can include itching, swelling, pain when you urinate or have sex, and a gray or yellow discharge with a \"fishy\" odor. It is not considered an infection that is spread through sexual contact. Although symptoms can be annoying and uncomfortable, bacterial vaginosis does not usually cause other health problems. However, if you have it while you are pregnant, it can cause complications. While the infection may go away on its own, most doctors use antibiotics to treat it. You may have been prescribed pills or vaginal cream. With treatment, bacterial vaginosis usually clears up in 5 to 7 days. Follow-up care is a key part of your treatment and safety. Be sure to make and go to all appointments, and call your doctor if you are having problems. It's also a good idea to know your test results and keep a list of the medicines you take. How can you care for yourself at home? · Take your antibiotics as directed. Do not stop taking them just because you feel better. You need to take the full course of antibiotics. · Do not eat or drink anything that contains alcohol if you are taking metronidazole (Flagyl). · Keep using your medicine if you start your period. Use pads instead of tampons while using a vaginal cream or suppository. Tampons can absorb the medicine. · Wear loose cotton clothing. Do not wear nylon and other materials that hold body heat and moisture close to the skin. · Do not scratch. Relieve itching with a cold pack or a cool bath. · Do not wash your vaginal area more than once a day. Use plain water or a mild, unscented soap. Do not douche. When should you call for help? Watch closely for changes in your health, and be sure to contact your doctor if: 
· You have unexpected vaginal bleeding. · You have a fever. · You have new or increased pain in your vagina or pelvis. · You are not getting better after 1 week. · Your symptoms return after you finish the course of your medicine. Where can you learn more? Go to http://juan ramon-chuy.info/. Jose Toro in the search box to learn more about \"Bacterial Vaginosis: Care Instructions. \" Current as of: October 13, 2016 Content Version: 11.3 © 6365-1430 [employer]. Care instructions adapted under license by [employer]. (which disclaims liability or warranty for this information). If you have questions about a medical condition or this instruction, always ask your healthcare professional. [place] [address] any warranty or liability for your use of this information. Introducing [facility]! Dear Haley Mathews: Thank you for requesting a Satago account. Our records indicate that you already have an active Satago account. You can access your account anytime at [email or web address] Did you know that you can access your hospital and ER discharge instructions at any time in Satago? You can also review all of your test results from your hospital stay or ER visit. Additional Information If you have questions, please visit the Frequently Asked Questions section of the Satago website at [email or web address] Remember, Satago is NOT to be used for urgent needs. For medical emergencies, dial 911. Now available from your iPhone and Android! Please provide this summary of care documentation to your next provider. Your primary care clinician is listed as Tessa Bermudez. If you have any questions after today's visit, please call [phone].

## 2017-10-25 ENCOUNTER — OFFICE VISIT (OUTPATIENT)
Dept: ORTHOPEDIC SURGERY | Age: 42
End: 2017-10-25

## 2017-10-25 VITALS
HEIGHT: 63 IN | SYSTOLIC BLOOD PRESSURE: 161 MMHG | DIASTOLIC BLOOD PRESSURE: 107 MMHG | WEIGHT: 239 LBS | BODY MASS INDEX: 42.35 KG/M2 | TEMPERATURE: 98.2 F | HEART RATE: 74 BPM | RESPIRATION RATE: 16 BRPM

## 2017-10-25 DIAGNOSIS — M79.18 MYOFASCIAL PAIN: Primary | ICD-10-CM

## 2017-10-25 NOTE — MR AVS SNAPSHOT
Visit Information Date & Time Provider Department Dept. Phone Encounter #  
 10/25/2017  2:30 PM Boby James MD [facility] [phone] [id number] Follow-up Instructions Return in about 3 months (around 1/25/2018), or if symptoms worsen or fail to improve. Routing History Your Appointments 11/9/2017  1:00 PM  
PHYSICAL with Vikki Lenz MD  
[facility] Appt Note: CPE; LM with date to reschedule \jpc 10/24/17; r/s from 10/31/2017 [address] [facility] [address]  
[address] [facility]  
  
    
 1/12/2018  2:15 PM  
ROUTINE CARE with Vikki Lenz MD  
[facility] Appt Note: 5 month followup [address] [facility]  
[address] [facility] [address] [facility] [address] Upcoming Health Maintenance Date Due INFLUENZA AGE 9 TO ADULT 8/1/2017 PAP AKA CERVICAL CYTOLOGY 8/21/2018 DTaP/Tdap/Td series (2 - Td) 10/25/2026 Allergies as of 10/25/2017  Review Complete On: 10/25/2017 By: Elba Lemus LPN Severity Noted Reaction Type Reactions Ace Inhibitors  09/26/2012    Swelling Current Immunizations  Reviewed on 10/25/2016 Name Date Influenza Vaccine (Quad) PF 10/25/2016 Influenza Vaccine PF 12/11/2013 Pneumococcal Polysaccharide (PPSV-23) 10/25/2016 Tdap 10/25/2016 Not reviewed this visit You Were Diagnosed With   
  
 Codes Comments Myofascial pain    -  Primary ICD-10-CM: M79.1 ICD-9-CM: 729.1 Vitals BP Pulse Temp Resp Height(growth percentile) Weight(growth percentile) (!) 161/107 74 98.2 °F (36.8 °C) (Oral) 16 5' 3\" (1.6 m) 239 lb (108.4 kg) BMI OB Status Smoking Status 42.34 kg/m2 Having regular periods Current Every Day Smoker BMI and BSA Data Body Mass Index Body Surface Area  
 42.34 kg/m 2 2.2 m 2 Preferred Pharmacy Pharmacy Name Phone [facility] [address] [phone] Your Updated Medication List  
  
   
This list is accurate as of: 10/25/17  3:58 PM.  Always use your most recent med list.  
  
  
  
  
 albuterol 90 mcg/actuation inhaler Commonly known as:  [facility] Take 2 Puffs by inhalation every four (4) hours as needed for Wheezing. amLODIPine 10 mg tablet Commonly known as:  [place] Take 1 Tab by mouth daily. atorvastatin 80 mg tablet Commonly known as:  LIPITOR Take 1 Tab by mouth daily. Blood-Glucose Meter monitoring kit Commonly known as:  FREESTYLE LITE METER Check fasting glucose once daily buPROPion  mg tablet Commonly known as:  [place] Jelly Take 150 mg by mouth every morning. butalbital-acetaminophen-caff -40 mg per capsule Commonly known as:  [employer] Take 1 tablet every 8 hours as needed for headache  
  
 carvedilol 25 mg tablet Commonly known as:  Jinx Re Take 1 Tab by mouth two (2) times daily (with meals). cloNIDine HCl 0.1 mg tablet Commonly known as:  CATAPRES Take 1 Tab by mouth two (2) times a day. diclofenac EC 75 mg EC tablet Commonly known as:  VOLTAREN Take 1 Tab by mouth two (2) times daily as needed. ferrous sulfate 325 mg (65 mg iron) tablet TAKE ONE TABLET BY MOUTH ONCE DAILY BEFORE BREAKFAST  
  
 gabapentin 400 mg capsule Commonly known as:  NEURONTIN  
1 three times a day GARLIC PO Take  by mouth. glucose blood VI test strips strip Commonly known as:  FREESTYLE LITE STRIPS Check fasting glucose once daily Lancets Misc Check fasting glucose once daily MAGNESIUM-CALCIUM-FOLIC ACID PO Take  by mouth. metFORMIN 1,000 mg tablet Commonly known as:  GLUCOPHAGE Take 1 Tab by mouth two (2) times daily (with meals). metroNIDAZOLE 500 mg tablet Commonly known as:  FLAGYL Take 1 Tab by mouth three (3) times daily for 10 days. norethindrone-ethinyl estradiol-iron 1.5 mg-30 mcg (21)/75 mg (7) Tab Commonly known as:  [place] FE1.5/30 Take 1 Tab by mouth daily. Indications: Abnormal Uterine Bleeding  
  
 sertraline 50 mg tablet Commonly known as:  ZOLOFT Take 75 mg by mouth nightly. VITAMIN D3 2,000 unit Tab Generic drug:  cholecalciferol (vitamin D3) Take  by mouth. Follow-up Instructions Return in about 3 months (around 1/25/2018), or if symptoms worsen or fail to improve. To-Do List   
 11/16/2017 1:00 PM  
  Appointment with Surendra Dennis RD at [address] [place] [address] Patient Instructions Rotator Cuff: Exercises Your Care Instructions Here are some examples of typical rehabilitation exercises for your condition. Start each exercise slowly. Ease off the exercise if you start to have pain. Your doctor or physical therapist will tell you when you can start these exercises and which ones will work best for you. How to do the exercises Pendulum swing If you have pain in your back, do not do this exercise. 1. Hold on to a table or the back of a chair with your good arm. Then bend forward a little and let your sore arm hang straight down. This exercise does not use the arm muscles. Rather, use your legs and your hips to create movement that makes your arm swing freely. 2. Use the movement from your hips and legs to guide the slightly swinging arm back and forth like a pendulum (or elephant trunk). Then guide it in circles that start small (about the size of a dinner plate). Make the circles a bit larger each day, as your pain allows. 3. Do this exercise for 5 minutes, 5 to 7 times each day. 4. As you have less pain, try bending over a little farther to do this exercise. This will increase the amount of movement at your shoulder. Posterior stretching exercise 1. Hold the elbow of your injured arm with your other hand. 2. Use your hand to pull your injured arm gently up and across your body. You will feel a gentle stretch across the back of your injured shoulder. 3. Hold for at least 15 to 30 seconds. Then slowly lower your arm. 4. Repeat 2 to 4 times. Up-the-back stretch Your doctor or physical therapist may want you to wait to do this stretch until you have regained most of your range of motion and strength. You can do this stretch in different ways. Hold any of these stretches for at least 15 to 30 seconds. Repeat them 2 to 4 times. 1. Put your hand in your back pocket. Let it rest there to stretch your shoulder. 2. With your other hand, hold your injured arm (palm outward) behind your back by the wrist. Pull your arm up gently to stretch your shoulder. 3. Next, put a towel over your other shoulder. Put the hand of your injured arm behind your back. Now hold the back end of the towel. With the other hand, hold the front end of the towel in front of your body. Pull gently on the front end of the towel. This will bring your hand farther up your back to stretch your shoulder. Overhead stretch 1. Standing about an arm's length away, grasp onto a solid surface. You could use a countertop, a doorknob, or the back of a sturdy chair. 2. With your knees slightly bent, bend forward with your arms straight. Lower your upper body, and let your shoulders stretch. 3. As your shoulders are able to stretch farther, you may need to take a step or two backward. 4. Hold for at least 15 to 30 seconds. Then stand up and relax. If you had stepped back during your stretch, step forward so you can keep your hands on the solid surface. 5. Repeat 2 to 4 times. Shoulder flexion (lying down) To make a wand for this exercise, use a piece of PVC pipe or a broom handle with the broom removed. Make the wand about a foot wider than your shoulders. 1. Lie on your back, holding a wand with both hands. Your palms should face down as you hold the wand. 2. Keeping your elbows straight, slowly raise your arms over your head. Raise them until you feel a stretch in your shoulders, upper back, and chest. 
3. Hold for 15 to 30 seconds. 4. Repeat 2 to 4 times. Shoulder rotation (lying down) To make a wand for this exercise, use a piece of PVC pipe or a broom handle with the broom removed. Make the wand about a foot wider than your shoulders. 1. Lie on your back. Hold a wand with both hands with your elbows bent and palms up. 2. Keep your elbows close to your body, and move the wand across your body toward the sore arm. 3. Hold for 8 to 12 seconds. 4. Repeat 2 to 4 times. Wall climbing (to the side) Avoid any movement that is straight to your side, and be careful not to arch your back. Your arm should stay about 30 degrees to the front of your side. 1. Stand with your side to a wall so that your fingers can just touch it at an angle about 30 degrees toward the front of your body. 2. Walk the fingers of your injured arm up the wall as high as pain permits. Try not to shrug your shoulder up toward your ear as you move your arm up. 3. Hold that position for a count of at least 15 to 20. 
4. Walk your fingers back down to the starting position. 5. Repeat at least 2 to 4 times. Try to reach higher each time. Wall climbing (to the front) During this stretching exercise, be careful not to arch your back. 1. Face a wall, and stand so your fingers can just touch it. 2. Keeping your shoulder down, walk the fingers of your injured arm up the wall as high as pain permits. (Don't shrug your shoulder up toward your ear.) 3. Hold your arm in that position for at least 15 to 30 seconds. 4. Slowly walk your fingers back down to where you started. 5. Repeat at least 2 to 4 times. Try to reach higher each time. Shoulder blade squeeze 1. Stand with your arms at your sides, and squeeze your shoulder blades together. Do not raise your shoulders up as you squeeze. 2. Hold 6 seconds. 3. Repeat 8 to 12 times. Scapular exercise: Arm reach 1. Lie flat on your back. This exercise is a very slight motion that starts with your arms raised (elbows straight, arms straight). 2. From this position, reach higher toward the mena or ceiling. Keep your elbows straight. All motion should be from your shoulder blade only. 3. Relax your arms back to where you started. 4. Repeat 8 to 12 times. Arm raise to the side During this strengthening exercise, your arm should stay about 30 degrees to the front of your side. 1. Slowly raise your injured arm to the side, with your thumb facing up. Raise your arm 60 degrees at the most (shoulder level is 90 degrees). 2. Hold the position for 3 to 5 seconds. Then lower your arm back to your side. If you need to, bring your \"good\" arm across your body and place it under the elbow as you lower your injured arm. Use your good arm to keep your injured arm from dropping down too fast. 
3. Repeat 8 to 12 times. 4. When you first start out, don't hold any extra weight in your hand. As you get stronger, you may use a 1-pound to 2-pound dumbbell or a small can of food. Shoulder flexor and extensor exercise These are isometric exercises. That means you contract your muscles without actually moving. 1. Push forward (flex): Stand facing a wall or doorjamb, about 6 inches or less back. Hold your injured arm against your body. Make a closed fist with your thumb on top. Then gently push your hand forward into the wall with about 25% to 50% of your strength. Don't let your body move backward as you push. Hold for about 6 seconds. Relax for a few seconds. Repeat 8 to 12 times. 2. Push backward (extend): Stand with your back flat against a wall. Your upper arm should be against the wall, with your elbow bent 90 degrees (your hand straight ahead). Push your elbow gently back against the wall with about 25% to 50% of your strength. Don't let your body move forward as you push. Hold for about 6 seconds. Relax for a few seconds. Repeat 8 to 12 times. Scapular exercise: Wall push-ups This exercise is best done with your fingers somewhat turned out, rather than straight up and down. 1. Stand facing a wall, about 12 inches to 18 inches away. 2. Place your hands on the wall at shoulder height. 3. Slowly bend your elbows and bring your face to the wall. Keep your back and hips straight. 4. Push back to where you started. 5. Repeat 8 to 12 times. 6. When you can do this exercise against a wall comfortably, you can try it against a counter. You can then slowly progress to the end of a couch, then to a sturdy chair, and finally to the floor. Scapular exercise: Retraction For this exercise, you will need elastic exercise material, such as surgical tubing or Thera-Band. 1. Put the band around a solid object at about waist level. (A bedpost will work well.) Each hand should hold an end of the band. 2. With your elbows at your sides and bent to 90 degrees, pull the band back. Your shoulder blades should move toward each other. Then move your arms back where you started. 3. Repeat 8 to 12 times. 4. If you have good range of motion in your shoulders, try this exercise with your arms lifted out to the sides. Keep your elbows at a 90-degree angle. Raise the elastic band up to about shoulder level. Pull the band back to move your shoulder blades toward each other. Then move your arms back where you started. Internal rotator strengthening exercise 1. Start by tying a piece of elastic exercise material to a doorknob. You can use surgical tubing or Thera-Band. 2. Stand or sit with your shoulder relaxed and your elbow bent 90 degrees. Your upper arm should rest comfortably against your side. Squeeze a rolled towel between your elbow and your body for comfort. This will help keep your arm at your side. 3. Hold one end of the elastic band in the hand of the painful arm. 4. Slowly rotate your forearm toward your body until it touches your belly. Slowly move it back to where you started. 5. Keep your elbow and upper arm firmly tucked against the towel roll or at your side. 6. Repeat 8 to 12 times. External rotator strengthening exercise 1. Start by tying a piece of elastic exercise material to a doorknob. You can use surgical tubing or Thera-Band. (You may also hold one end of the band in each hand.) 2. Stand or sit with your shoulder relaxed and your elbow bent 90 degrees. Your upper arm should rest comfortably against your side. Squeeze a rolled towel between your elbow and your body for comfort. This will help keep your arm at your side. 3. Hold one end of the elastic band with the hand of the painful arm. 4. Start with your forearm across your belly. Slowly rotate the forearm out away from your body. Keep your elbow and upper arm tucked against the towel roll or the side of your body until you begin to feel tightness in your shoulder. Slowly move your arm back to where you started. 5. Repeat 8 to 12 times. Follow-up care is a key part of your treatment and safety. Be sure to make and go to all appointments, and call your doctor if you are having problems. It's also a good idea to know your test results and keep a list of the medicines you take. Where can you learn more? Go to http://juan ramon-chuy.info/. Enter Dulce Eng in the search box to learn more about \"Rotator Cuff: Exercises. \" Current as of: March 21, 2017 Content Version: 11.4 © 9608-2039 [employer].  Care instructions adapted under license by [address] (which disclaims liability or warranty for this information). If you have questions about a medical condition or this instruction, always ask your healthcare professional. [address] any warranty or liability for your use of this information. Introducing [facility]! Dear [place]: Thank you for requesting a [employer] account. Our records indicate that you already have an active [employer] account. You can access your account anytime at [email or web address] Did you know that you can access your hospital and ER discharge instructions at any time in [employer]? You can also review all of your test results from your hospital stay or ER visit. Additional Information If you have questions, please visit the Frequently Asked Questions section of the [employer] website at [email or web address] Remember, [employer] is NOT to be used for urgent needs. For medical emergencies, dial 911. Now available from your iPhone and Android! Please provide this summary of care documentation to your next provider. Your primary care clinician is listed as Adenike Madison. If you have any questions after today's visit, please call [phone].
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
## 2017-10-25 NOTE — PATIENT INSTRUCTIONS
Rotator Cuff: Exercises  Your Care Instructions  Here are some examples of typical rehabilitation exercises for your condition. Start each exercise slowly. Ease off the exercise if you start to have pain. Your doctor or physical therapist will tell you when you can start these exercises and which ones will work best for you. How to do the exercises  Pendulum swing    If you have pain in your back, do not do this exercise. 1. Hold on to a table or the back of a chair with your good arm. Then bend forward a little and let your sore arm hang straight down. This exercise does not use the arm muscles. Rather, use your legs and your hips to create movement that makes your arm swing freely. 2. Use the movement from your hips and legs to guide the slightly swinging arm back and forth like a pendulum (or elephant trunk). Then guide it in circles that start small (about the size of a dinner plate). Make the circles a bit larger each day, as your pain allows. 3. Do this exercise for 5 minutes, 5 to 7 times each day. 4. As you have less pain, try bending over a little farther to do this exercise. This will increase the amount of movement at your shoulder. Posterior stretching exercise    1. Hold the elbow of your injured arm with your other hand. 2. Use your hand to pull your injured arm gently up and across your body. You will feel a gentle stretch across the back of your injured shoulder. 3. Hold for at least 15 to 30 seconds. Then slowly lower your arm. 4. Repeat 2 to 4 times. Up-the-back stretch    Your doctor or physical therapist may want you to wait to do this stretch until you have regained most of your range of motion and strength. You can do this stretch in different ways. Hold any of these stretches for at least 15 to 30 seconds. Repeat them 2 to 4 times. 1. Put your hand in your back pocket. Let it rest there to stretch your shoulder.   2. With your other hand, hold your injured arm (palm outward) behind your back by the wrist. Pull your arm up gently to stretch your shoulder. 3. Next, put a towel over your other shoulder. Put the hand of your injured arm behind your back. Now hold the back end of the towel. With the other hand, hold the front end of the towel in front of your body. Pull gently on the front end of the towel. This will bring your hand farther up your back to stretch your shoulder. Overhead stretch    1. Standing about an arm's length away, grasp onto a solid surface. You could use a countertop, a doorknob, or the back of a sturdy chair. 2. With your knees slightly bent, bend forward with your arms straight. Lower your upper body, and let your shoulders stretch. 3. As your shoulders are able to stretch farther, you may need to take a step or two backward. 4. Hold for at least 15 to 30 seconds. Then stand up and relax. If you had stepped back during your stretch, step forward so you can keep your hands on the solid surface. 5. Repeat 2 to 4 times. Shoulder flexion (lying down)    To make a wand for this exercise, use a piece of PVC pipe or a broom handle with the broom removed. Make the wand about a foot wider than your shoulders. 1. Lie on your back, holding a wand with both hands. Your palms should face down as you hold the wand. 2. Keeping your elbows straight, slowly raise your arms over your head. Raise them until you feel a stretch in your shoulders, upper back, and chest.  3. Hold for 15 to 30 seconds. 4. Repeat 2 to 4 times. Shoulder rotation (lying down)    To make a wand for this exercise, use a piece of PVC pipe or a broom handle with the broom removed. Make the wand about a foot wider than your shoulders. 1. Lie on your back. Hold a wand with both hands with your elbows bent and palms up. 2. Keep your elbows close to your body, and move the wand across your body toward the sore arm. 3. Hold for 8 to 12 seconds. 4. Repeat 2 to 4 times.   Wall climbing (to the side)    Avoid any movement that is straight to your side, and be careful not to arch your back. Your arm should stay about 30 degrees to the front of your side. 1. Stand with your side to a wall so that your fingers can just touch it at an angle about 30 degrees toward the front of your body. 2. Walk the fingers of your injured arm up the wall as high as pain permits. Try not to shrug your shoulder up toward your ear as you move your arm up. 3. Hold that position for a count of at least 15 to 20.  4. Walk your fingers back down to the starting position. 5. Repeat at least 2 to 4 times. Try to reach higher each time. Wall climbing (to the front)    During this stretching exercise, be careful not to arch your back. 1. Face a wall, and stand so your fingers can just touch it. 2. Keeping your shoulder down, walk the fingers of your injured arm up the wall as high as pain permits. (Don't shrug your shoulder up toward your ear.)  3. Hold your arm in that position for at least 15 to 30 seconds. 4. Slowly walk your fingers back down to where you started. 5. Repeat at least 2 to 4 times. Try to reach higher each time. Shoulder blade squeeze    1. Stand with your arms at your sides, and squeeze your shoulder blades together. Do not raise your shoulders up as you squeeze. 2. Hold 6 seconds. 3. Repeat 8 to 12 times. Scapular exercise: Arm reach    1. Lie flat on your back. This exercise is a very slight motion that starts with your arms raised (elbows straight, arms straight). 2. From this position, reach higher toward the mena or ceiling. Keep your elbows straight. All motion should be from your shoulder blade only. 3. Relax your arms back to where you started. 4. Repeat 8 to 12 times. Arm raise to the side    During this strengthening exercise, your arm should stay about 30 degrees to the front of your side. 1. Slowly raise your injured arm to the side, with your thumb facing up.  Raise your arm 60 degrees at the most (shoulder level is 90 degrees). 2. Hold the position for 3 to 5 seconds. Then lower your arm back to your side. If you need to, bring your \"good\" arm across your body and place it under the elbow as you lower your injured arm. Use your good arm to keep your injured arm from dropping down too fast.  3. Repeat 8 to 12 times. 4. When you first start out, don't hold any extra weight in your hand. As you get stronger, you may use a 1-pound to 2-pound dumbbell or a small can of food. Shoulder flexor and extensor exercise    These are isometric exercises. That means you contract your muscles without actually moving. 1. Push forward (flex): Stand facing a wall or doorjamb, about 6 inches or less back. Hold your injured arm against your body. Make a closed fist with your thumb on top. Then gently push your hand forward into the wall with about 25% to 50% of your strength. Don't let your body move backward as you push. Hold for about 6 seconds. Relax for a few seconds. Repeat 8 to 12 times. 2. Push backward (extend): Stand with your back flat against a wall. Your upper arm should be against the wall, with your elbow bent 90 degrees (your hand straight ahead). Push your elbow gently back against the wall with about 25% to 50% of your strength. Don't let your body move forward as you push. Hold for about 6 seconds. Relax for a few seconds. Repeat 8 to 12 times. Scapular exercise: Wall push-ups    This exercise is best done with your fingers somewhat turned out, rather than straight up and down. 1. Stand facing a wall, about 12 inches to 18 inches away. 2. Place your hands on the wall at shoulder height. 3. Slowly bend your elbows and bring your face to the wall. Keep your back and hips straight. 4. Push back to where you started. 5. Repeat 8 to 12 times. 6. When you can do this exercise against a wall comfortably, you can try it against a counter.  You can then slowly progress to the end of a couch, then to a sturdy chair, and finally to the floor. Scapular exercise: Retraction    For this exercise, you will need elastic exercise material, such as surgical tubing or Thera-Band. 1. Put the band around a solid object at about waist level. (A bedpost will work well.) Each hand should hold an end of the band. 2. With your elbows at your sides and bent to 90 degrees, pull the band back. Your shoulder blades should move toward each other. Then move your arms back where you started. 3. Repeat 8 to 12 times. 4. If you have good range of motion in your shoulders, try this exercise with your arms lifted out to the sides. Keep your elbows at a 90-degree angle. Raise the elastic band up to about shoulder level. Pull the band back to move your shoulder blades toward each other. Then move your arms back where you started. Internal rotator strengthening exercise    1. Start by tying a piece of elastic exercise material to a doorknob. You can use surgical tubing or Thera-Band. 2. Stand or sit with your shoulder relaxed and your elbow bent 90 degrees. Your upper arm should rest comfortably against your side. Squeeze a rolled towel between your elbow and your body for comfort. This will help keep your arm at your side. 3. Hold one end of the elastic band in the hand of the painful arm. 4. Slowly rotate your forearm toward your body until it touches your belly. Slowly move it back to where you started. 5. Keep your elbow and upper arm firmly tucked against the towel roll or at your side. 6. Repeat 8 to 12 times. External rotator strengthening exercise    1. Start by tying a piece of elastic exercise material to a doorknob. You can use surgical tubing or Thera-Band. (You may also hold one end of the band in each hand.)  2. Stand or sit with your shoulder relaxed and your elbow bent 90 degrees. Your upper arm should rest comfortably against your side. Squeeze a rolled towel between your elbow and your body for comfort.  This will help keep your arm at your side. 3. Hold one end of the elastic band with the hand of the painful arm. 4. Start with your forearm across your belly. Slowly rotate the forearm out away from your body. Keep your elbow and upper arm tucked against the towel roll or the side of your body until you begin to feel tightness in your shoulder. Slowly move your arm back to where you started. 5. Repeat 8 to 12 times. Follow-up care is a key part of your treatment and safety. Be sure to make and go to all appointments, and call your doctor if you are having problems. It's also a good idea to know your test results and keep a list of the medicines you take. Where can you learn more? Go to http://juan ramon-chuy.info/. Enter Todd Licea in the search box to learn more about \"Rotator Cuff: Exercises. \"  Current as of: March 21, 2017  Content Version: 11.4  © 3174-5470 [employer]. Care instructions adapted under license by [employer] (which disclaims liability or warranty for this information). If you have questions about a medical condition or this instruction, always ask your healthcare professional. [address] any warranty or liability for your use of this information.

## 2017-11-09 ENCOUNTER — HOSPITAL ENCOUNTER (OUTPATIENT)
Dept: LAB | Age: 42
Discharge: HOME | End: 2017-11-09
Payer: MEDICAID

## 2017-11-09 ENCOUNTER — HOSPITAL ENCOUNTER (OUTPATIENT)
Dept: GENERAL RADIOLOGY | Age: 42
Discharge: HOME | End: 2017-11-09
Payer: MEDICAID

## 2017-11-09 ENCOUNTER — OFFICE VISIT (OUTPATIENT)
Dept: FAMILY MEDICINE CLINIC | Age: 42
End: 2017-11-09

## 2017-11-09 VITALS
BODY MASS INDEX: 40.46 KG/M2 | OXYGEN SATURATION: 98 % | DIASTOLIC BLOOD PRESSURE: 84 MMHG | HEART RATE: 87 BPM | TEMPERATURE: 98.4 F | WEIGHT: 237 LBS | SYSTOLIC BLOOD PRESSURE: 126 MMHG | HEIGHT: 64 IN | RESPIRATION RATE: 16 BRPM

## 2017-11-09 DIAGNOSIS — R80.9 MICROALBUMINURIA: ICD-10-CM

## 2017-11-09 DIAGNOSIS — D50.0 IRON DEFICIENCY ANEMIA DUE TO CHRONIC BLOOD LOSS: ICD-10-CM

## 2017-11-09 DIAGNOSIS — Z23 ENCOUNTER FOR IMMUNIZATION: ICD-10-CM

## 2017-11-09 DIAGNOSIS — Z00.00 WELL ADULT EXAM: Primary | ICD-10-CM

## 2017-11-09 DIAGNOSIS — Z01.84 IMMUNITY STATUS TESTING: ICD-10-CM

## 2017-11-09 DIAGNOSIS — G56.03 BILATERAL CARPAL TUNNEL SYNDROME: ICD-10-CM

## 2017-11-09 DIAGNOSIS — E55.9 VITAMIN D DEFICIENCY: ICD-10-CM

## 2017-11-09 DIAGNOSIS — R76.11 POSITIVE PPD: ICD-10-CM

## 2017-11-09 DIAGNOSIS — I10 ESSENTIAL HYPERTENSION: ICD-10-CM

## 2017-11-09 DIAGNOSIS — Z72.0 TOBACCO USE: ICD-10-CM

## 2017-11-09 PROBLEM — E11.9 CONTROLLED TYPE 2 DIABETES MELLITUS WITHOUT COMPLICATION, WITHOUT LONG-TERM CURRENT USE OF INSULIN (HCC): Status: RESOLVED | Noted: 2017-11-09 | Resolved: 2017-11-09

## 2017-11-09 PROBLEM — E11.9 CONTROLLED TYPE 2 DIABETES MELLITUS WITHOUT COMPLICATION, WITHOUT LONG-TERM CURRENT USE OF INSULIN (HCC): Status: ACTIVE | Noted: 2017-11-09

## 2017-11-09 LAB
25(OH)D3 SERPL-MCNC: 19.6 NG/ML (ref 30–100)
ALBUMIN SERPL-MCNC: 3.5 G/DL (ref 3.4–5)
ALBUMIN UR QL STRIP: 80 MG/L
ALBUMIN/GLOB SERPL: 0.9 {RATIO} (ref 0.8–1.7)
ALP SERPL-CCNC: 62 U/L (ref 45–117)
ALT SERPL-CCNC: 22 U/L (ref 13–56)
ANION GAP SERPL CALC-SCNC: 8 MMOL/L (ref 3–18)
AST SERPL-CCNC: 16 U/L (ref 15–37)
BASOPHILS # BLD: 0 K/UL (ref 0–0.1)
BASOPHILS NFR BLD: 0 % (ref 0–2)
BILIRUB SERPL-MCNC: 0.3 MG/DL (ref 0.2–1)
BUN SERPL-MCNC: 14 MG/DL (ref 7–18)
BUN/CREAT SERPL: 22 (ref 12–20)
CALCIUM SERPL-MCNC: 8.9 MG/DL (ref 8.5–10.1)
CHLORIDE SERPL-SCNC: 107 MMOL/L (ref 100–108)
CHOLEST SERPL-MCNC: 145 MG/DL
CO2 SERPL-SCNC: 26 MMOL/L (ref 21–32)
CREAT SERPL-MCNC: 0.65 MG/DL (ref 0.6–1.3)
CREATININE, URINE POC: 300 MG/DL
DIFFERENTIAL METHOD BLD: ABNORMAL
EOSINOPHIL # BLD: 0 K/UL (ref 0–0.4)
EOSINOPHIL NFR BLD: 1 % (ref 0–5)
ERYTHROCYTE [DISTWIDTH] IN BLOOD BY AUTOMATED COUNT: 14.5 % (ref 11.6–14.5)
GLOBULIN SER CALC-MCNC: 4.1 G/DL (ref 2–4)
GLUCOSE SERPL-MCNC: 97 MG/DL (ref 74–99)
HBA1C MFR BLD: 7 % (ref 4.2–5.6)
HBV SURFACE AB SER QL IA: NEGATIVE
HBV SURFACE AB SERPL IA-ACNC: 3.6 MIU/ML
HCT VFR BLD AUTO: 33.7 % (ref 35–45)
HDLC SERPL-MCNC: 39 MG/DL (ref 40–60)
HDLC SERPL: 3.7 {RATIO} (ref 0–5)
HEP BS AB COMMENT,HBSAC: ABNORMAL
HGB BLD-MCNC: 10.9 G/DL (ref 12–16)
LDLC SERPL CALC-MCNC: 73 MG/DL (ref 0–100)
LIPID PROFILE,FLP: ABNORMAL
LYMPHOCYTES # BLD: 2.3 K/UL (ref 0.9–3.6)
LYMPHOCYTES NFR BLD: 52 % (ref 21–52)
MCH RBC QN AUTO: 30.4 PG (ref 24–34)
MCHC RBC AUTO-ENTMCNC: 32.3 G/DL (ref 31–37)
MCV RBC AUTO: 93.9 FL (ref 74–97)
MICROALBUMIN/CREAT RATIO POC: ABNORMAL MG/G
MONOCYTES # BLD: 0.4 K/UL (ref 0.05–1.2)
MONOCYTES NFR BLD: 9 % (ref 3–10)
NEUTS SEG # BLD: 1.7 K/UL (ref 1.8–8)
NEUTS SEG NFR BLD: 38 % (ref 40–73)
PLATELET # BLD AUTO: 375 K/UL (ref 135–420)
PMV BLD AUTO: 9.9 FL (ref 9.2–11.8)
POTASSIUM SERPL-SCNC: 4 MMOL/L (ref 3.5–5.5)
PROT SERPL-MCNC: 7.6 G/DL (ref 6.4–8.2)
RBC # BLD AUTO: 3.59 M/UL (ref 4.2–5.3)
RUBV IGG SER-IMP: NORMAL
SODIUM SERPL-SCNC: 141 MMOL/L (ref 136–145)
TRIGL SERPL-MCNC: 165 MG/DL (ref ?–150)
VLDLC SERPL CALC-MCNC: 33 MG/DL
WBC # BLD AUTO: 4.4 K/UL (ref 4.6–13.2)

## 2017-11-09 PROCEDURE — 86317 IMMUNOASSAY INFECTIOUS AGENT: CPT | Performed by: FAMILY MEDICINE

## 2017-11-09 PROCEDURE — 80061 LIPID PANEL: CPT | Performed by: FAMILY MEDICINE

## 2017-11-09 PROCEDURE — 86762 RUBELLA ANTIBODY: CPT | Performed by: FAMILY MEDICINE

## 2017-11-09 PROCEDURE — 86706 HEP B SURFACE ANTIBODY: CPT | Performed by: FAMILY MEDICINE

## 2017-11-09 PROCEDURE — 86765 RUBEOLA ANTIBODY: CPT | Performed by: FAMILY MEDICINE

## 2017-11-09 PROCEDURE — 83036 HEMOGLOBIN GLYCOSYLATED A1C: CPT | Performed by: FAMILY MEDICINE

## 2017-11-09 PROCEDURE — 86803 HEPATITIS C AB TEST: CPT | Performed by: FAMILY MEDICINE

## 2017-11-09 PROCEDURE — 85025 COMPLETE CBC W/AUTO DIFF WBC: CPT | Performed by: FAMILY MEDICINE

## 2017-11-09 PROCEDURE — 36415 COLL VENOUS BLD VENIPUNCTURE: CPT | Performed by: FAMILY MEDICINE

## 2017-11-09 PROCEDURE — 86735 MUMPS ANTIBODY: CPT | Performed by: FAMILY MEDICINE

## 2017-11-09 PROCEDURE — 82306 VITAMIN D 25 HYDROXY: CPT | Performed by: FAMILY MEDICINE

## 2017-11-09 PROCEDURE — 80053 COMPREHEN METABOLIC PANEL: CPT | Performed by: FAMILY MEDICINE

## 2017-11-09 PROCEDURE — 71020 XR CHEST PA LAT: CPT

## 2017-11-09 NOTE — MR AVS SNAPSHOT
Visit Information Date & Time Provider Department Dept. Phone Encounter #  
 11/9/2017  1:00 PM Stephen Bojorquez, [address] [id number] Follow-up Instructions Return in about 1 week (around 11/16/2017), or if symptoms worsen or fail to improve. Your Appointments 1/12/2018  2:15 PM  
ROUTINE CARE with Stephen Bojorquez MD  
[facility] ([facility]) Appt Note: 5 month followup [address] [place] [address] Street [address]  
  
    
 1/24/2018  2:45 PM  
Follow Up with Brad Grier MD  
[address] [facility] [facility]) Appt Note: 3 MO F/U  
 [address] [facility] [id number] [phone]  
  
   
 [address] [facility] [address] Upcoming Health Maintenance Date Due  
 PAP AKA CERVICAL CYTOLOGY 8/21/2018 DTaP/Tdap/Td series (2 - Td) 10/25/2026 Allergies as of 11/9/2017  Review Complete On: 11/9/2017 By: Stephen Bojorquez MD  
  
 Severity Noted Reaction Type Reactions Ace Inhibitors  09/26/2012    Swelling Current Immunizations  Reviewed on 10/25/2016 Name Date Influenza Vaccine (Quad) PF 11/9/2017  1:22 PM, 10/25/2016 Influenza Vaccine PF 12/11/2013 Pneumococcal Polysaccharide (PPSV-23) 10/25/2016 Tdap 10/25/2016 Not reviewed this visit You Were Diagnosed With   
  
 Codes Comments Vitamin D deficiency    -  Primary ICD-10-CM: E55.9 ICD-9-CM: 268.9 Encounter for immunization     ICD-10-CM: F99 ICD-9-CM: V03.89 Essential hypertension     ICD-10-CM: I10 
ICD-9-CM: 401.9 BMI 40.0-44.9, adult [facility])     ICD-10-CM: Z68.41 
ICD-9-CM: V85.41 Uncontrolled type 2 diabetes mellitus without complication, without long-term current use of insulin ([facility]     ICD-10-CM: E11.65 ICD-9-CM: 250.02   
 Tobacco use     ICD-10-CM: Z72.0 ICD-9-CM: 305.1 Iron deficiency anemia due to chronic blood loss     ICD-10-CM: D50.0 ICD-9-CM: 280.0 Well adult exam     ICD-10-CM: Z00.00 ICD-9-CM: V70.0 Bilateral carpal tunnel syndrome     ICD-10-CM: G56.03 
ICD-9-CM: 354.0 Positive PPD     ICD-10-CM: R76.11 
ICD-9-CM: 795.51 Immunity status testing     ICD-10-CM: Z01.84 ICD-9-CM: V72.61 Vitals BP Pulse Temp Resp Height(growth percentile) Weight(growth percentile) 126/84 (BP 1 Location: Left arm, BP Patient Position: Sitting) 87 98.4 °F (36.9 °C) (Oral) 16 5' 3.5\" (1.613 m) 237 lb (107.5 kg) LMP SpO2 BMI OB Status Smoking Status 10/13/2017 (Approximate) 98% 41.32 kg/m2 Having regular periods Current Every Day Smoker Vitals History BMI and BSA Data Body Mass Index Body Surface Area  
 41.32 kg/m 2 2.19 m 2 Preferred Pharmacy Pharmacy Name Phone [phone] [address] [phone] Your Updated Medication List  
  
   
This list is accurate as of: 11/9/17  1:36 PM.  Always use your most recent med list.  
  
  
  
  
 albuterol 90 mcg/actuation inhaler Commonly known as:  Lydia Watkins Take 2 Puffs by inhalation every four (4) hours as needed for Wheezing. amLODIPine 10 mg tablet Commonly known as:  Membrenowanda TenorioFernanda Take 1 Tab by mouth daily. atorvastatin 80 mg tablet Commonly known as:  LIPITOR Take 1 Tab by mouth daily. Blood-Glucose Meter monitoring kit Commonly known as:  FREESTYLE LITE METER Check fasting glucose once daily buPROPion  mg tablet Commonly known as:  Klever Borges Take 150 mg by mouth every morning. butalbital-acetaminophen-caff -40 mg per capsule Commonly known as:  Lucent Tim Take 1 tablet every 8 hours as needed for headache  
  
 carvedilol 25 mg tablet Commonly known as:  Erlin Bella  
 Take 1 Tab by mouth two (2) times daily (with meals). cloNIDine HCl 0.1 mg tablet Commonly known as:  CATAPRES Take 1 Tab by mouth two (2) times a day. diclofenac EC 75 mg EC tablet Commonly known as:  VOLTAREN Take 1 Tab by mouth two (2) times daily as needed. ferrous sulfate 325 mg (65 mg iron) tablet TAKE ONE TABLET BY MOUTH ONCE DAILY BEFORE BREAKFAST  
  
 gabapentin 400 mg capsule Commonly known as:  NEURONTIN  
1 three times a day GARLIC PO Take  by mouth. glucose blood VI test strips strip Commonly known as:  FREESTYLE LITE STRIPS Check fasting glucose once daily Lancets Misc Check fasting glucose once daily MAGNESIUM-CALCIUM-FOLIC ACID PO Take  by mouth.  
  
 metFORMIN 1,000 mg tablet Commonly known as:  GLUCOPHAGE Take 1 Tab by mouth two (2) times daily (with meals). norethindrone-ethinyl estradiol-iron 1.5 mg-30 mcg (21)/75 mg (7) Tab Commonly known as:  Delmus Reek FE1.5/30 Take 1 Tab by mouth daily. Indications: Abnormal Uterine Bleeding  
  
 sertraline 50 mg tablet Commonly known as:  ZOLOFT Take 75 mg by mouth nightly. VITAMIN D3 2,000 unit Tab Generic drug:  cholecalciferol (vitamin D3) Take  by mouth. We Performed the Following AMB POC URINE, MICROALBUMIN, SEMIQUANT (3 RESULTS) [35432 CPT(R)] INFLUENZA VIRUS VAC QUAD,SPLIT,PRESV FREE SYRINGE IM [id number] CPT(R)] Follow-up Instructions Return in about 1 week (around 11/16/2017), or if symptoms worsen or fail to improve. To-Do List   
 11/09/2017 Lab:  CBC WITH AUTOMATED DIFF   
  
 11/09/2017 Lab:  HEMOGLOBIN A1C W/O EAG   
  
 11/09/2017 Lab:  HEPATITIS B SURF AB QUANT   
  
 11/09/2017 Lab:  HEPATITIS C AB   
  
 11/09/2017 Lab:  LIPID PANEL   
  
 11/09/2017 Lab:  METABOLIC PANEL, COMPREHENSIVE   
  
 11/09/2017 Lab:  MUMPS AB, IGG   
  
 11/09/2017 Lab:  RUBELLA AB, IGG   
  
 11/09/2017 Lab: RUBEOLA AB, IGG   
  
 11/09/2017 Lab:  VITAMIN D, 25 HYDROXY   
  
 11/09/2017 Imaging:  XR CHEST PA LAT   
  
 11/16/2017 1:00 PM  
  Appointment with Emma Sorto RD at [facility] Patient Instructions Vaccine Information Statement Influenza (Flu) Vaccine (Inactivated or Recombinant): What you need to know Many Vaccine Information Statements are available in Greenlandic and other languages. See www.immunize.org/vis Hojas de Información Sobre Vacunas están disponibles en Español y en muchos otros idiomas. Visite www.immunize.org/vis 1. Why get vaccinated? Influenza (flu) is a contagious disease that spreads around the [facility] every year, usually between October and May. Flu is caused by influenza viruses, and is spread mainly by coughing, sneezing, and close contact. Anyone can get flu. Flu strikes suddenly and can last several days. Symptoms vary by age, but can include: 
 fever/chills  sore throat  muscle aches  fatigue  cough  headache  runny or stuffy nose Flu can also lead to pneumonia and blood infections, and cause diarrhea and seizures in children. If you have a medical condition, such as heart or lung disease, flu can make it worse. Flu is more dangerous for some people. Infants and young children, people 72years of age and older, pregnant women, and people with certain health conditions or a weakened immune system are at greatest risk. Each year thousands of people in the [facility] die from flu, and many more are hospitalized. Flu vaccine can: 
 keep you from getting flu, 
 make flu less severe if you do get it, and 
 keep you from spreading flu to your family and other people. 2. Inactivated and recombinant flu vaccines A dose of flu vaccine is recommended every flu season. Children 6 months through 6years of age may need two doses during the same flu season. Everyone else needs only one dose each flu season. Some inactivated flu vaccines contain a very small amount of a mercury-based preservative called thimerosal. Studies have not shown thimerosal in vaccines to be harmful, but flu vaccines that do not contain thimerosal are available. There is no live flu virus in flu shots. They cannot cause the flu. There are many flu viruses, and they are always changing. Each year a new flu vaccine is made to protect against three or four viruses that are likely to cause disease in the upcoming flu season. But even when the vaccine doesnt exactly match these viruses, it may still provide some protection Flu vaccine cannot prevent: 
 flu that is caused by a virus not covered by the vaccine, or 
 illnesses that look like flu but are not. It takes about 2 weeks for protection to develop after vaccination, and protection lasts through the flu season. 3. Some people should not get this vaccine Tell the person who is giving you the vaccine:  If you have any severe, life-threatening allergies. If you ever had a life-threatening allergic reaction after a dose of flu vaccine, or have a severe allergy to any part of this vaccine, you may be advised not to get vaccinated. Most, but not all, types of flu vaccine contain a small amount of egg protein.  If you ever had Guillain-Barré Syndrome (also called GBS). Some people with a history of GBS should not get this vaccine. This should be discussed with your doctor.  If you are not feeling well. It is usually okay to get flu vaccine when you have a mild illness, but you might be asked to come back when you feel better. 4. Risks of a vaccine reaction With any medicine, including vaccines, there is a chance of reactions. These are usually mild and go away on their own, but serious reactions are also possible. Most people who get a flu shot do not have any problems with it. Minor problems following a flu shot include:  
 soreness, redness, or swelling where the shot was given  hoarseness  sore, red or itchy eyes  cough  fever  aches  headache  itching  fatigue If these problems occur, they usually begin soon after the shot and last 1 or 2 days. More serious problems following a flu shot can include the following:  There may be a small increased risk of Guillain-Barré Syndrome (GBS) after inactivated flu vaccine. This risk has been estimated at 1 or 2 additional cases per million people vaccinated. This is much lower than the risk of severe complications from flu, which can be prevented by flu vaccine.  Young children who get the flu shot along with pneumococcal vaccine (PCV13) and/or DTaP vaccine at the same time might be slightly more likely to have a seizure caused by fever. Ask your doctor for more information. Tell your doctor if a child who is getting flu vaccine has ever had a seizure. Problems that could happen after any injected vaccine:  People sometimes faint after a medical procedure, including vaccination. Sitting or lying down for about 15 minutes can help prevent fainting, and injuries caused by a fall. Tell your doctor if you feel dizzy, or have vision changes or ringing in the ears.  Some people get severe pain in the shoulder and have difficulty moving the arm where a shot was given. This happens very rarely.  Any medication can cause a severe allergic reaction. Such reactions from a vaccine are very rare, estimated at about 1 in a million doses, and would happen within a few minutes to a few hours after the vaccination. As with any medicine, there is a very remote chance of a vaccine causing a serious injury or death. The safety of vaccines is always being monitored. For more information, visit: www.cdc.gov/vaccinesafety/ 
 
5. What if there is a serious reaction? What should I look for?  Look for anything that concerns you, such as signs of a severe allergic reaction, very high fever, or unusual behavior. Signs of a severe allergic reaction can include hives, swelling of the face and throat, difficulty breathing, a fast heartbeat, dizziness, and weakness  usually within a few minutes to a few hours after the vaccination. What should I do?  If you think it is a severe allergic reaction or other emergency that cant wait, call 9-1-1 and get the person to the nearest hospital. Otherwise, call your doctor.  Reactions should be reported to the Vaccine Adverse Event Reporting System (VAERS). Your doctor should file this report, or you can do it yourself through  the VAERS web site at www.vaers. [facility].gov, or by calling 1-833.753.6436. VAERS does not give medical advice. 6. The National Vaccine Injury Compensation Program 
 
The [facility] Vaccine Injury Compensation Program (VICP) is a federal program that was created to compensate people who may have been injured by certain vaccines. Persons who believe they may have been injured by a vaccine can learn about the program and about filing a claim by calling 1-365.237.2290 or visiting the [facility] [address] website at www.[facility].gov/vaccinecompensation. There is a time limit to file a claim for compensation. 7. How can I learn more?  Ask your healthcare provider. He or she can give you the vaccine package insert or suggest other sources of information.  Call your local or state health department.  Contact the Centers for Disease Control and Prevention (CDC): 
- Call 5-754.377.2378 (1-800-CDC-INFO) or 
- Visit CDCs website at www.cdc.gov/flu Vaccine Information Statement Inactivated Influenza Vaccine 8/7/2015 
42 MARTIN Veronica [id number] Department of [facility] and [employer] Centers for Disease Control and Prevention Office Use Only Lists of hospitals in the [place] & HEALTH SERVICES! Dear Gabby Bridges: Thank you for requesting a [employer] account. Our records indicate that you already have an active Poikos account. You can access your account anytime at [email or web address] Did you know that you can access your hospital and ER discharge instructions at any time in [employer]? You can also review all of your test results from your hospital stay or ER visit. Additional Information If you have questions, please visit the Frequently Asked Questions section of the [employer] website at [email or web address] Remember, [employer] is NOT to be used for urgent needs. For medical emergencies, dial 911. Now available from your iPhone and Android! Please provide this summary of care documentation to your next provider. Your primary care clinician is listed as Leyla Vidal. If you have any questions after today's visit, please call [phone].

## 2017-11-09 NOTE — PATIENT INSTRUCTIONS
Vaccine Information Statement    Influenza (Flu) Vaccine (Inactivated or Recombinant): What you need to know    Many Vaccine Information Statements are available in Turkish and other languages. See www.immunize.org/vis  Hojas de Información Sobre Vacunas están disponibles en Español y en muchos otros idiomas. Visite www.immunize.org/vis    1. Why get vaccinated? Influenza (flu) is a contagious disease that spreads around the [place] every year, usually between October and May. Flu is caused by influenza viruses, and is spread mainly by coughing, sneezing, and close contact. Anyone can get flu. Flu strikes suddenly and can last several days. Symptoms vary by age, but can include:   fever/chills   sore throat   muscle aches   fatigue   cough   headache    runny or stuffy nose    Flu can also lead to pneumonia and blood infections, and cause diarrhea and seizures in children. If you have a medical condition, such as heart or lung disease, flu can make it worse. Flu is more dangerous for some people. Infants and young children, people 72years of age and older, pregnant women, and people with certain health conditions or a weakened immune system are at greatest risk. Each year thousands of people in the [facility] die from flu, and many more are hospitalized. Flu vaccine can:   keep you from getting flu,   make flu less severe if you do get it, and   keep you from spreading flu to your family and other people. 2. Inactivated and recombinant flu vaccines    A dose of flu vaccine is recommended every flu season. Children 6 months through 6years of age may need two doses during the same flu season. Everyone else needs only one dose each flu season.        Some inactivated flu vaccines contain a very small amount of a mercury-based preservative called thimerosal. Studies have not shown thimerosal in vaccines to be harmful, but flu vaccines that do not contain thimerosal are available. There is no live flu virus in flu shots. They cannot cause the flu. There are many flu viruses, and they are always changing. Each year a new flu vaccine is made to protect against three or four viruses that are likely to cause disease in the upcoming flu season. But even when the vaccine doesnt exactly match these viruses, it may still provide some protection    Flu vaccine cannot prevent:   flu that is caused by a virus not covered by the vaccine, or   illnesses that look like flu but are not. It takes about 2 weeks for protection to develop after vaccination, and protection lasts through the flu season. 3. Some people should not get this vaccine    Tell the person who is giving you the vaccine:     If you have any severe, life-threatening allergies. If you ever had a life-threatening allergic reaction after a dose of flu vaccine, or have a severe allergy to any part of this vaccine, you may be advised not to get vaccinated. Most, but not all, types of flu vaccine contain a small amount of egg protein.  If you ever had Guillain-Barré Syndrome (also called GBS). Some people with a history of GBS should not get this vaccine. This should be discussed with your doctor.  If you are not feeling well. It is usually okay to get flu vaccine when you have a mild illness, but you might be asked to come back when you feel better. 4. Risks of a vaccine reaction    With any medicine, including vaccines, there is a chance of reactions. These are usually mild and go away on their own, but serious reactions are also possible. Most people who get a flu shot do not have any problems with it.      Minor problems following a flu shot include:    soreness, redness, or swelling where the shot was given     hoarseness   sore, red or itchy eyes   cough   fever   aches   headache   itching   fatigue  If these problems occur, they usually begin soon after the shot and last 1 or 2 days. More serious problems following a flu shot can include the following:     There may be a small increased risk of Guillain-Barré Syndrome (GBS) after inactivated flu vaccine. This risk has been estimated at 1 or 2 additional cases per million people vaccinated. This is much lower than the risk of severe complications from flu, which can be prevented by flu vaccine.  Young children who get the flu shot along with pneumococcal vaccine (PCV13) and/or DTaP vaccine at the same time might be slightly more likely to have a seizure caused by fever. Ask your doctor for more information. Tell your doctor if a child who is getting flu vaccine has ever had a seizure. Problems that could happen after any injected vaccine:      People sometimes faint after a medical procedure, including vaccination. Sitting or lying down for about 15 minutes can help prevent fainting, and injuries caused by a fall. Tell your doctor if you feel dizzy, or have vision changes or ringing in the ears.  Some people get severe pain in the shoulder and have difficulty moving the arm where a shot was given. This happens very rarely.  Any medication can cause a severe allergic reaction. Such reactions from a vaccine are very rare, estimated at about 1 in a million doses, and would happen within a few minutes to a few hours after the vaccination. As with any medicine, there is a very remote chance of a vaccine causing a serious injury or death. The safety of vaccines is always being monitored. For more information, visit: www.cdc.gov/vaccinesafety/    5. What if there is a serious reaction? What should I look for?  Look for anything that concerns you, such as signs of a severe allergic reaction, very high fever, or unusual behavior.     Signs of a severe allergic reaction can include hives, swelling of the face and throat, difficulty breathing, a fast heartbeat, dizziness, and weakness  usually within a few minutes to a few hours after the vaccination. What should I do?  If you think it is a severe allergic reaction or other emergency that cant wait, call 9-1-1 and get the person to the nearest hospital. Otherwise, call your doctor.  Reactions should be reported to the Vaccine Adverse Event Reporting System (VAERS). Your doctor should file this report, or you can do it yourself through  the VAERS web site at www.vaers. [facility].gov, or by calling 5-475.531.9256. VAERS does not give medical advice. 6. The National Vaccine Injury Compensation Program    The [facility] Vaccine Injury Compensation Program (VICP) is a federal program that was created to compensate people who may have been injured by certain vaccines. Persons who believe they may have been injured by a vaccine can learn about the program and about filing a claim by calling 3-313.924.2376 or visiting the [employer] website at www.[facility].gov/vaccinecompensation. There is a time limit to file a claim for compensation. 7. How can I learn more?  Ask your healthcare provider. He or she can give you the vaccine package insert or suggest other sources of information.  Call your local or state health department.  Contact the Centers for Disease Control and Prevention (CDC):  - Call 7-374.854.5073 (1-800-CDC-INFO) or  - Visit CDCs website at www.cdc.gov/flu    Vaccine Information Statement   Inactivated Influenza Vaccine   8/7/2015  42 MARTIN Marie [id number]    Department of Health and Human Services  Centers for Disease Control and Prevention    Office Use Only

## 2017-11-09 NOTE — PROGRESS NOTES
1. Have you been to the ER, urgent care clinic since your last visit? Hospitalized since your last visit? No    2. Have you seen or consulted any other health care providers outside of the [address] [place] [address] since your last visit? Include any pap smears or colon screening. Dr. Adela Strong  LMP;10/13/2017  Contraception type:pill  Vaginal problems:no  Last mammogram:never  Last Pap:08/21/2015 WNL  Last Tdap: 10/25/2017  Family hx of ovarian ca. No  Family hx of breast ca:No  Family hx of colon ca:  No

## 2017-11-09 NOTE — PROGRESS NOTES
Patient presents for flu vaccine. Consent obtained, Tolerated procedure well at left deltoid. Patient remained in office 15 minutes post vaccine. No side effects noted.      Lot #  [id number]  exp 06/30/2018  [employer]  [address]: [id number]

## 2017-11-09 NOTE — PROGRESS NOTES
Subjective:   38 y.o. female for Well Woman Check. Her gyne and breast care is done elsewhere by her Ob-Gyne physician. Dr. Susi Tony    Past Medical History:   Diagnosis Date    Abnormal uterine bleeding (AUB)     Anxiety and depression     Asthma     Cardiac echocardiogram 01/27/2017    EF 55-60%. No WMA. Mod conc LVH. Gr 2 DDfx. Mild AI.  Cardiac nuclear imaging test 01/27/2017    Mod risk. Mod area of anterior apical ischemia likely. No infarction. EF 48%. Mild anterior apical hypk. Equivocal EKG on pharm stress test.    Diabetes (HCC)     GERD (gastroesophageal reflux disease)     History of blood transfusion     HSV-2 (herpes simplex virus 2) infection 1/2014    CSF     Hx of colonoscopy 04/25/2016    internal hemorrhoids, no polyp dr Shania Mendoza. repeat in 10 years. 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 pylori test 6/9/14    placed on antibiotics and H2 blocker 6/11/14    Positive PPD, treated 2000    PPD positive, treated     Renal duplex 11/17/2016    No significant RA stenosis.  Vitamin D deficiency 6/9/14     Past Surgical History:   Procedure Laterality Date    CARDIAC CATHETERIZATION  7/20/2017         CORONARY ARTERY ANGIOGRAM  7/20/2017         ENDOMETRIAL CRYOABLATION      HX COLONOSCOPY  04/25/2016    DR. ESTRADA REPEAT IN 2026 (10 YEARS)    HX HERNIA REPAIR  7907    umbilical    LV ANGIOGRAPHY  7/20/2017         MODERATE SEDATION  7/20/2017          Family History   Problem Relation Age of Onset    Hypertension Mother     Diabetes Mother     Depression Mother     Substance Abuse Mother Tobacco use    Migraines Mother     High Cholesterol Mother     Heart Attack Mother     Heart Failure Mother     Eczema Mother     Arthritis-osteo Mother     Hypertension Father     Diabetes Father     Substance Abuse Father      Tobacco use and drug abuse    Stroke Paternal Aunt     Heart Disease Maternal Grandfather     Hypertension Maternal Grandfather     High Cholesterol Maternal Grandfather     Diabetes Maternal Grandfather     Stroke Maternal Grandfather     Heart Attack Maternal Grandfather     Heart Failure Maternal Grandfather     Alcohol abuse Brother      Drug/Alcohol abuse    Substance Abuse Maternal Grandmother      Tobacco use - MGM, MGF, and brother,    Diabetes Maternal Grandmother     Hypertension Brother     High Cholesterol Brother     Diabetes Sister     Diabetes Paternal Grandmother     Heart Attack Brother     Heart Failure Brother      Social History   Substance Use Topics    Smoking status: Current Every Day Smoker     Packs/day: 0.50     Years: 0.50    Smokeless tobacco: Never Used    Alcohol use 1.2 oz/week     2 Cans of beer per week      Comment: monthly 1 or 2        ROS: Feeling generally well. No TIA's or unusual headaches, no dysphagia. No prolonged cough. No dyspnea or chest pain on exertion. No abdominal pain, change in bowel habits, black or bloody stools. No urinary tract symptoms. No new or unusual musculoskeletal symptoms. 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 Visit Vitals    /84 (BP 1 Location: Left arm, BP Patient Position: Sitting)    Pulse 87    Temp 98.4 °F (36.9 °C) (Oral)    Resp 16    Ht 5' 3.5\" (1.613 m)    Wt 237 lb (107.5 kg)    LMP 10/13/2017 (Approximate)    SpO2 98%    BMI 41.32 kg/m2     ENT normal.  Neck supple. No adenopathy or thyromegaly. LARISSA.  Lungs are clear, good air entry, no wheezes, rhonchi or rales. S1 and S2 normal, no murmurs, regular rate and rhythm. Abdomen soft without tenderness, guarding, mass or organomegaly. Extremities show no edema, normal peripheral pulses. Neurological is normal, no focal findings. Breast and Pelvic exams are deferred. Assessment/Plan:   Diagnoses and all orders for this visit:       Well adult exam  Well Woman  lose weight, increase physical activity, stop smoking (advice and handout given), follow low fat diet, follow low salt diet, have labs drawn prior to ROV  reviewed diet, exercise and weight control   Encounter for immunization  -     Influenza virus vaccine (QUADRIVALENT PRES FREE SYRINGE) IM (61379)     Tobacco use    Positive PPD  -     XR CHEST PA LAT; Future     Immunity status testing  -     HEPATITIS B SURF AB QUANT; Future  -     MUMPS AB, IGG; Future  -     RUBELLA AB, IGG; Future  -     RUBEOLA AB, IGG; Future  -     HEPATITIS C AB; Future      Alea F Natacha, 38 y.o.,  female    SUBJECTIVE   ff-up    DM- 's on metformin, says trying to follow diet, difficult to curb her cravings, late night snacking. Has yet to schedule with eye doctor for dilated exam.     HTN-taking medications without problems. She continues to smoke. She had cardiac catheterization without evidence of CAD    HL on lipitor- Reviewed labs    anemia-secondary to DUB, previous GI work up neg. On OCP, following dr. Arina Mendoza    Currently seeing dr. Cheryle Olivares for bilateral hand pain, EMG reviewed c/w carpal tunnel. Says saw psychologist in [facility], and was dx with bipolar disorder, previously was anxiety/depression. She does not agree with diagnosis and hence did not ff-up.      ROS:  See HPI, all others negative        Patient Active Problem List   Diagnosis Code    HTN (hypertension) I10    Gastroesophageal reflux disease K21.9    Anxiety and depression F41.8    Positive H. pylori test A04.8    Iron deficiency anemia D50.9    Vitamin D deficiency E55.9    Tobacco use Z72.0    Bilateral carpal tunnel syndrome G56.03    Pure hypercholesterolemia E78.00    Episodic tension-type headache, not intractable G44.219    BMI 40.0-44.9, adult ([facility]) Z68.41    Uncontrolled type 2 diabetes mellitus without complication, without long-term current use of insulin ([facility]) E11.65       Current Outpatient Prescriptions   Medication Sig Dispense Refill    norethindrone-ethinyl estradiol-iron (MICROGESTIN FE1.5/30) 1.5 mg-30 mcg (21)/75 mg (7) tab Take 1 Tab by mouth daily. Indications: Abnormal Uterine Bleeding 1 Package 12    metFORMIN (GLUCOPHAGE) 1,000 mg tablet Take 1 Tab by mouth two (2) times daily (with meals). 180 Tab 1    atorvastatin (LIPITOR) 80 mg tablet Take 1 Tab by mouth daily. 90 Tab 2    Blood-Glucose Meter (FREESTYLE LITE METER) monitoring kit Check fasting glucose once daily 1 Kit 0    glucose blood VI test strips (FREESTYLE LITE STRIPS) strip Check fasting glucose once daily 100 Strip 2    Lancets misc Check fasting glucose once daily 1 Each 11    butalbital-acetaminophen-caff (FIORICET) -40 mg per capsule Take 1 tablet every 8 hours as needed for headache 12 Cap 0    cholecalciferol, vitamin D3, (VITAMIN D3) 2,000 unit tab Take  by mouth.  CALCIUM CARB/MAG CARB/FOLIC AC (MAGNESIUM-CALCIUM-FOLIC ACID PO) Take  by mouth.  gabapentin (NEURONTIN) 400 mg capsule 1 three times a day 90 Cap 3    amLODIPine (NORVASC) 10 mg tablet Take 1 Tab by mouth daily. 30 Tab 11    diclofenac EC (VOLTAREN) 75 mg EC tablet Take 1 Tab by mouth two (2) times daily as needed. 60 Tab 5    ferrous sulfate 325 mg (65 mg iron) tablet TAKE ONE TABLET BY MOUTH ONCE DAILY BEFORE BREAKFAST 90 Tab 1    GARLIC PO Take  by mouth.  cloNIDine HCl (CATAPRES) 0.1 mg tablet Take 1 Tab by mouth two (2) times a day. 60 Tab 11    carvedilol (COREG) 25 mg tablet Take 1 Tab by mouth two (2) times daily (with meals).  60 Tab 11    albuterol (PROVENTIL, VENTOLIN) 90 mcg/actuation inhaler Take 2 Puffs by inhalation every four (4) hours as needed for Wheezing. 17 g 0    sertraline (ZOLOFT) 50 mg tablet Take 75 mg by mouth nightly.  buPROPion XL (WELLBUTRIN XL) 150 mg tablet Take 150 mg by mouth every morning. Allergies   Allergen Reactions    Ace Inhibitors Swelling       Past Medical History:   Diagnosis Date    Abnormal uterine bleeding (AUB)     Anxiety and depression     Asthma     Cardiac echocardiogram 01/27/2017    EF 55-60%. No WMA. Mod conc LVH. Gr 2 DDfx. Mild AI.  Cardiac nuclear imaging test 01/27/2017    Mod risk. Mod area of anterior apical ischemia likely. No infarction. EF 48%. Mild anterior apical hypk. Equivocal EKG on pharm stress test.    Diabetes (HCC)     GERD (gastroesophageal reflux disease)     History of blood transfusion     HSV-2 (herpes simplex virus 2) infection 1/2014    CSF     Hx of colonoscopy 04/25/2016    internal hemorrhoids, no polyp dr Paloma Aceves. repeat in 10 years. 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 pylori test 6/9/14    placed on antibiotics and H2 blocker 6/11/14    Positive PPD, treated 2000    PPD positive, treated     Renal duplex 11/17/2016    No significant RA stenosis.  Vitamin D deficiency 6/9/14       Social History     Social History    Marital status:      Spouse name: N/A    Number of children: N/A    Years of education: N/A     Occupational History    Not on file.      Social History Main Topics    Smoking status: Current Every Day Smoker     Packs/day: 0.50     Years: 0.50    Smokeless tobacco: Never Used    Alcohol use 1.2 oz/week     2 Cans of beer per week      Comment: monthly 1 or 2    Drug use: Yes     Special: Marijuana, Cocaine      Comment: Cocaine stopped 2008, Marijuana 4/2/16 usually once a month or two    Sexual activity: Not Currently     Partners: Female, Male     Birth control/ protection: None     Other Topics Concern   [address] Service Yes     [id number]    Blood Transfusions Yes     2012    Caffeine Concern No    Occupational Exposure No    Hobby Hazards No    Sleep Concern No    Stress Concern Yes    Weight Concern No    Special Diet No    Back Care No    Exercise No    Bike Helmet No    Seat Belt No    Self-Exams No     Social History Narrative       Family History   Problem Relation Age of Onset    Hypertension Mother     Diabetes Mother     Depression Mother     Substance Abuse Mother      Tobacco use    Migraines Mother     High Cholesterol Mother     Heart Attack Mother     Heart Failure Mother     Eczema Mother     Arthritis-osteo Mother     Hypertension Father     Diabetes Father     Substance Abuse Father      Tobacco use and drug abuse    Stroke Paternal Aunt     Heart Disease Maternal Grandfather     Hypertension Maternal Grandfather     High Cholesterol Maternal Grandfather     Diabetes Maternal Grandfather     Stroke Maternal Grandfather     Heart Attack Maternal Grandfather     Heart Failure Maternal Grandfather     Alcohol abuse Brother      Drug/Alcohol abuse    Substance Abuse Maternal Grandmother      Tobacco use - MGM, MGF, and brother,    Diabetes Maternal Grandmother     Hypertension Brother     High Cholesterol Brother     Diabetes Sister     Diabetes Paternal Grandmother     Heart Attack Brother     Heart Failure Brother          OBJECTIVE    Physical Exam:     Visit Vitals    /84 (BP 1 Location: Left arm, BP Patient Position: Sitting)    Pulse 87    Temp 98.4 °F (36.9 °C) (Oral)    Resp 16    Ht 5' 3.5\" (1.613 m)    Wt 237 lb (107.5 kg)    LMP 10/13/2017 (Approximate)    SpO2 98%    BMI 41.32 kg/m2       General: alert, obese, AA, in no apparent distress or pain  Neck: supple, no adenopathy palpated  CVS: normal rate, regular rhythm, distinct S1 and S2  Lungs:clear to ausculation bilaterally, no crackles, wheezing or rhonchi noted  Extremities: no edema, no cyanosis, feet no lesions normal monofilament. Skin: warm, mild abrasion L antecubital area, does not appear infected. Psych:  mood and affect normal    Results for orders placed or performed in visit on 11/09/17   AMB POC URINE, MICROALBUMIN, SEMIQUANT (3 RESULTS)   Result Value Ref Range    ALBUMIN, URINE POC 80 Negative mg/L    CREATININE, URINE  mg/dL    Microalbumin/creat ratio (POC)  MG/G         ASSESSMENT/PLAN  Alea was seen today for hand pain. Diagnoses and all orders for this visit:  Diabetes mellitus without complication  Improving 5.4>4.7  Cont  metformin to 1000 mg bid  Eye exam-referral placed, has yet to schedule, pt remidned  Foot exam- 7/17  Microalbumin-11/17 positive, h/o angioedema on ACE. Optimize glucose control for now. Lipid panel -8/17at goal LDL <100, cont lipitor dose 80 mg qhs  a1c -8/17    Check a1c/cmp/lipid panel prior to next visit. Essential hypertension  Controlled, cont current regimen  on clonidine/coreg, norvasc, chlorthalidone  Intolerant to ACE  Counseled on smoking cessation    Iron deficiency anemia due to chronic blood loss   likely due to menorrhagia, mild hgb 10.8  Cont care with dr. Cassy Lugo for DUB on OCP   Colonoscopy/egd 4/2016 normal, internal hemorrhoids noted. .   Cont po fe supplementation    Vitamin D deficiency  Improved, to take 1000 iu daily.    Recheck Vit D prior to next visit    BMI 40.0-44.9, adult ([address]  Encouraged wt loss    Anxiety and depression  Encouraged ff-up appt with [facility]PA for further evaluation, pt is agreeable, and would schedule herself    Pure hypercholesterolemia-  At goal on lipitor 80 mg qhs, to cont    Tobacco use  Counseled on cessation    Carpal tunnel   on gabapentin, dr Inge Guerrero following    Myofascial pain  HEP rec'd  Dr Letty Malin    Follow-up Disposition:  Return in about 1 week (around 11/16/2017), or if symptoms worsen or fail to improve. Patient understands plan of care. Patient has provided input and agrees with goals.

## 2017-11-10 LAB
HCV AB SER IA-ACNC: 0.15 INDEX
HCV AB SERPL QL IA: NEGATIVE
HCV COMMENT,HCGAC: NORMAL

## 2017-11-11 LAB
HBV SURFACE AB SER-ACNC: <3.1 MIU/ML
MEV IGG SER IA-ACNC: 259 AU/ML
MUV IGG SER IA-ACNC: 210 AU/ML

## 2017-11-21 ENCOUNTER — OFFICE VISIT (OUTPATIENT)
Dept: FAMILY MEDICINE CLINIC | Age: 42
End: 2017-11-21

## 2017-11-21 ENCOUNTER — HOSPITAL ENCOUNTER (OUTPATIENT)
Dept: LAB | Age: 42
Discharge: HOME | End: 2017-11-21
Payer: MEDICAID

## 2017-11-21 VITALS
DIASTOLIC BLOOD PRESSURE: 78 MMHG | HEART RATE: 84 BPM | SYSTOLIC BLOOD PRESSURE: 126 MMHG | RESPIRATION RATE: 16 BRPM | HEIGHT: 64 IN | BODY MASS INDEX: 40.29 KG/M2 | TEMPERATURE: 98.3 F | OXYGEN SATURATION: 98 % | WEIGHT: 236 LBS

## 2017-11-21 DIAGNOSIS — R80.9 MICROALBUMINURIA: ICD-10-CM

## 2017-11-21 DIAGNOSIS — Z01.84 IMMUNITY STATUS TESTING: ICD-10-CM

## 2017-11-21 DIAGNOSIS — Z92.89 HISTORY OF POSITIVE PPD: ICD-10-CM

## 2017-11-21 DIAGNOSIS — Z72.0 TOBACCO USE: ICD-10-CM

## 2017-11-21 DIAGNOSIS — E78.00 PURE HYPERCHOLESTEROLEMIA: ICD-10-CM

## 2017-11-21 DIAGNOSIS — Z78.9 NOT IMMUNE TO HEPATITIS B VIRUS: ICD-10-CM

## 2017-11-21 DIAGNOSIS — G56.03 BILATERAL CARPAL TUNNEL SYNDROME: ICD-10-CM

## 2017-11-21 PROCEDURE — 36415 COLL VENOUS BLD VENIPUNCTURE: CPT | Performed by: FAMILY MEDICINE

## 2017-11-21 PROCEDURE — 86787 VARICELLA-ZOSTER ANTIBODY: CPT | Performed by: FAMILY MEDICINE

## 2017-11-21 RX ORDER — ERGOCALCIFEROL 1.25 MG/1
50000 CAPSULE ORAL
Qty: 12 CAP | Refills: 0 | Status: SHIPPED | OUTPATIENT
Start: 2017-11-21 | End: 2018-05-07

## 2017-11-21 RX ORDER — GABAPENTIN 400 MG/1
CAPSULE ORAL
Qty: 90 CAP | Refills: 3 | Status: SHIPPED | OUTPATIENT
Start: 2017-11-21 | End: 2018-10-11 | Stop reason: SDUPTHER

## 2017-11-21 NOTE — MR AVS SNAPSHOT
Visit Information Date & Time Provider Department Dept. Phone Encounter #  
 11/21/2017  3:30 PM Tessa Bermudez, [address] [id number] Your Appointments 1/12/2018  2:15 PM  
ROUTINE CARE with Tessa Bermudez MD  
[address] [facility] ([facility]) Appt Note: 5 month followup [address] [facility]  
[address] [facility] [address] [address] [address] [address]  
  
    
 1/18/2018 10:40 AM  
Follow Up with Mara Escobar MD  
[facility] ([facility]) Appt Note: 6mth f/up [place] [address] [address] [address] [phone] [address] [facility] [address] [address]  
  
    
 1/24/2018  2:45 PM  
Follow Up with Zara Brooks MD  
[address] [facility], [address] and [facility] Appt Note: 3 MO F/U  
 [address] [facility] [address]  
[phone]  
  
   
 [address] [facility] [address] Upcoming Health Maintenance Date Due  
 EYE EXAM RETINAL OR DILATED Q1 4/27/1983 HEMOGLOBIN A1C Q6M 5/9/2018 FOOT EXAM Q1 8/3/2018 PAP AKA CERVICAL CYTOLOGY 8/21/2018 MICROALBUMIN Q1 11/9/2018 LIPID PANEL Q1 11/9/2018 DTaP/Tdap/Td series (2 - Td) 10/25/2026 Allergies as of 11/21/2017  Review Complete On: 11/21/2017 By: Luis E Singer LPN Severity Noted Reaction Type Reactions Ace Inhibitors  09/26/2012    Swelling Current Immunizations  Reviewed on 10/25/2016 Name Date Hep B Vaccine (Adult)  Incomplete Influenza Vaccine (Quad) PF 11/9/2017  1:22 PM, 10/25/2016 Influenza Vaccine PF 12/11/2013 Pneumococcal Polysaccharide (PPSV-23) 10/25/2016 Tdap 10/25/2016 Not reviewed this visit You Were Diagnosed With   
  
 Codes Comments Bilateral carpal tunnel syndrome    -  Primary ICD-10-CM: G56.03 
ICD-9-CM: 354.0 Not immune to hepatitis B virus     ICD-10-CM: B19.10 ICD-9-CM: 070.30 Immunity status testing     ICD-10-CM: Z01.84 ICD-9-CM: V72.61 Vitals BP Pulse Temp Resp Height(growth percentile) Weight(growth percentile) 126/78 (BP 1 Location: Left arm, BP Patient Position: Sitting) 84 98.3 °F (36.8 °C) (Oral) 16 5' 3.5\" (1.613 m) 236 lb (107 kg) LMP SpO2 BMI OB Status Smoking Status 10/13/2017 (Approximate) 98% 41.15 kg/m2 Having regular periods Current Every Day Smoker BMI and BSA Data Body Mass Index Body Surface Area  
 41.15 kg/m 2 2.19 m 2 Preferred Pharmacy Pharmacy Name Phone [id number] [address] [phone] Your Updated Medication List  
  
   
This list is accurate as of: 11/21/17  3:59 PM.  Always use your most recent med list.  
  
  
  
  
 albuterol 90 mcg/actuation inhaler Commonly known as:  Griggs Greaser Take 2 Puffs by inhalation every four (4) hours as needed for Wheezing. amLODIPine 10 mg tablet Commonly known as:  Wandra Trini Take 1 Tab by mouth daily. atorvastatin 80 mg tablet Commonly known as:  LIPITOR Take 1 Tab by mouth daily. Blood-Glucose Meter monitoring kit Commonly known as:  FREESTYLE LITE METER Check fasting glucose once daily buPROPion  mg tablet Commonly known as:  Lelia Sadlerler Take 150 mg by mouth every morning. butalbital-acetaminophen-caff -40 mg per capsule Commonly known as:  [employer] Take 1 tablet every 8 hours as needed for headache  
  
 carvedilol 25 mg tablet Commonly known as:  May Hush Take 1 Tab by mouth two (2) times daily (with meals). cloNIDine HCl 0.1 mg tablet Commonly known as:  CATAPRES Take 1 Tab by mouth two (2) times a day. diclofenac EC 75 mg EC tablet Commonly known as:  VOLTAREN  
 Take 1 Tab by mouth two (2) times daily as needed. ergocalciferol 50,000 unit capsule Commonly known as:  ERGOCALCIFEROL Take 1 Cap by mouth every seven (7) days. ferrous sulfate 325 mg (65 mg iron) tablet TAKE ONE TABLET BY MOUTH ONCE DAILY BEFORE BREAKFAST  
  
 gabapentin 400 mg capsule Commonly known as:  NEURONTIN  
1 three times a day GARLIC PO Take  by mouth. glucose blood VI test strips strip Commonly known as:  FREESTYLE LITE STRIPS Check fasting glucose once daily Lancets Misc Check fasting glucose once daily MAGNESIUM-CALCIUM-FOLIC ACID PO Take  by mouth.  
  
 metFORMIN 1,000 mg tablet Commonly known as:  GLUCOPHAGE Take 1 Tab by mouth two (2) times daily (with meals). norethindrone-ethinyl estradiol-iron 1.5 mg-30 mcg (21)/75 mg (7) Tab Commonly known as:  Leonardo Feathers FE1.5/30 Take 1 Tab by mouth daily. Indications: Abnormal Uterine Bleeding  
  
 sertraline 50 mg tablet Commonly known as:  ZOLOFT Take 75 mg by mouth nightly. VITAMIN D3 2,000 unit Tab Generic drug:  cholecalciferol (vitamin D3) Take  by mouth. Prescriptions Sent to Pharmacy Refills  
 ergocalciferol (ERGOCALCIFEROL) 50,000 unit capsule 0 Sig: Take 1 Cap by mouth every seven (7) days. Class: Normal  
 Pharmacy: [id number] [place] [address]. Ph #: [phone] Route: Oral  
 gabapentin (NEURONTIN) 400 mg capsule 3 Si three times a day Class: Normal  
 Pharmacy: [id number] [place] [address]. Ph #: [phone] We Performed the Following HEPATITIS B VACCINE, ADULT DOSAGE, IM [960[address] CPT(R)] To-Do List   
 2017 Lab:  VZV AB, IGG Introducing [facility]! Dear Barbi Figueroa: Thank you for requesting a [email or web address] account. Our records indicate that you already have an active [email or web address] account.   You can access your account anytime at [email or web address] Did you know that you can access your hospital and ER discharge instructions at any time in [facility]? You can also review all of your test results from your hospital stay or ER visit. Additional Information If you have questions, please visit the Frequently Asked Questions section of the [employer] website at [email or web address] Remember, [facility] is NOT to be used for urgent needs. For medical emergencies, dial 911. Now available from your iPhone and Android! Please provide this summary of care documentation to your next provider. Your primary care clinician is listed as Nelly Pisano. If you have any questions after today's visit, please call [phone].

## 2017-11-21 NOTE — PROGRESS NOTES
Alea CABAN Natacha, 38 y.o.,  female    SUBJECTIVE   ff-up    DM- taking metformin, says working on diet. Reviewed labs    HTN-taking medications without problems. She continues to smoke. She had cardiac catheterization 7/17 without significant evidence of CAD    HL on lipitor- Reviewed labs    anemia-secondary to DUB, previous GI work up neg. On OCP, following dr. Zoran Seo    Currently seeing dr. Olya Bello for bilateral hand pain, EMG reviewed c/w carpal tunnel. She wants to hold off on carpal tunnel release surgery and requesting refill on gabapentin which has effective for her. Says saw psychologist in [facility], and was dx with bipolar disorder, previously was anxiety/depression. She does not agree with diagnosis and hence did not ff-up. Says mood is stable, doing well, motivated in school. Requesting to complete school form. ROS:  See HPI, all others negative        Patient Active Problem List   Diagnosis Code    HTN (hypertension) I10    Gastroesophageal reflux disease K21.9    Anxiety and depression F41.8    Positive H. pylori test A04.8    Iron deficiency anemia D50.9    Vitamin D deficiency E55.9    Tobacco use Z72.0    Bilateral carpal tunnel syndrome G56.03    Pure hypercholesterolemia E78.00    Episodic tension-type headache, not intractable G44.219    BMI 40.0-44.9, adult (HCC) Z68.41    Uncontrolled type 2 diabetes mellitus without complication, without long-term current use of insulin (HCC) E11.65    Microalbuminuria- h/o angioedema on ace R80.9       Current Outpatient Prescriptions   Medication Sig Dispense Refill    ergocalciferol (ERGOCALCIFEROL) 50,000 unit capsule Take 1 Cap by mouth every seven (7) days. 12 Cap 0    gabapentin (NEURONTIN) 400 mg capsule 1 three times a day 90 Cap 3    norethindrone-ethinyl estradiol-iron (MICROGESTIN FE1.5/30) 1.5 mg-30 mcg (21)/75 mg (7) tab Take 1 Tab by mouth daily.  Indications: Abnormal Uterine Bleeding 1 Package 12    metFORMIN (GLUCOPHAGE) 1,000 mg tablet Take 1 Tab by mouth two (2) times daily (with meals). 180 Tab 1    atorvastatin (LIPITOR) 80 mg tablet Take 1 Tab by mouth daily. 90 Tab 2    Blood-Glucose Meter (FREESTYLE LITE METER) monitoring kit Check fasting glucose once daily 1 Kit 0    glucose blood VI test strips (FREESTYLE LITE STRIPS) strip Check fasting glucose once daily 100 Strip 2    Lancets misc Check fasting glucose once daily 1 Each 11    butalbital-acetaminophen-caff (FIORICET) -40 mg per capsule Take 1 tablet every 8 hours as needed for headache 12 Cap 0    cholecalciferol, vitamin D3, (VITAMIN D3) 2,000 unit tab Take  by mouth.  CALCIUM CARB/MAG CARB/FOLIC AC (MAGNESIUM-CALCIUM-FOLIC ACID PO) Take  by mouth.  amLODIPine (NORVASC) 10 mg tablet Take 1 Tab by mouth daily. 30 Tab 11    diclofenac EC (VOLTAREN) 75 mg EC tablet Take 1 Tab by mouth two (2) times daily as needed. 60 Tab 5    ferrous sulfate 325 mg (65 mg iron) tablet TAKE ONE TABLET BY MOUTH ONCE DAILY BEFORE BREAKFAST 90 Tab 1    GARLIC PO Take  by mouth.  cloNIDine HCl (CATAPRES) 0.1 mg tablet Take 1 Tab by mouth two (2) times a day. 60 Tab 11    carvedilol (COREG) 25 mg tablet Take 1 Tab by mouth two (2) times daily (with meals). 60 Tab 11    sertraline (ZOLOFT) 50 mg tablet Take 75 mg by mouth nightly.  buPROPion XL (WELLBUTRIN XL) 150 mg tablet Take 150 mg by mouth every morning.  albuterol (PROVENTIL, VENTOLIN) 90 mcg/actuation inhaler Take 2 Puffs by inhalation every four (4) hours as needed for Wheezing. 17 g 0       Allergies   Allergen Reactions    Ace Inhibitors Swelling       Past Medical History:   Diagnosis Date    Abnormal uterine bleeding (AUB)     Anxiety and depression     Asthma     Cardiac echocardiogram 01/27/2017    EF 55-60%. No WMA. Mod conc LVH. Gr 2 DDfx. Mild AI.  Cardiac nuclear imaging test 01/27/2017    Mod risk. Mod area of anterior apical ischemia likely.   No infarction. EF 48%. Mild anterior apical hypk. Equivocal EKG on pharm stress test.   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 repeat in 10 years. 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 pylori test 6/9/14    placed on antibiotics and H2 blocker 6/11/14    Positive PPD, treated 2000    PPD positive, treated     Renal duplex 11/17/2016    No significant RA stenosis.  Vitamin D deficiency 6/9/14       Social History     Social History    Marital status:      Spouse name: N/A    Number of children: N/A    Years of education: N/A     Occupational History    Not on file.      Social History Main Topics    Smoking status: Current Every Day Smoker     Packs/day: 0.50     Years: 0.50    Smokeless tobacco: Never Used    Alcohol use 1.2 oz/week     2 Cans of beer per week      Comment: monthly 1 or 2    Drug use: Yes     Special: Marijuana, Cocaine      Comment: Cocaine stopped 2008, Marijuana 4/2/16 usually once a month or two    Sexual activity: Not Currently     Partners: Female, Male     Birth control/ protection: None     Other Topics Concern     Service Yes     [id number]    Blood Transfusions Yes     2012    Caffeine Concern No    Occupational Exposure No    Hobby Hazards No    Sleep Concern No    Stress Concern Yes    Weight Concern No    Special Diet No    Back Care No    Exercise No    Bike Helmet No    Seat Belt No  Self-Exams No     Social History Narrative       Family History   Problem Relation Age of Onset    Hypertension Mother     Diabetes Mother     Depression Mother     Substance Abuse Mother      Tobacco use    Migraines Mother     High Cholesterol Mother     Heart Attack Mother     Heart Failure Mother     Eczema Mother     Arthritis-osteo Mother     Hypertension Father     Diabetes Father     Substance Abuse Father      Tobacco use and drug abuse    Stroke Paternal Aunt     Heart Disease Maternal Grandfather     Hypertension Maternal Grandfather     High Cholesterol Maternal Grandfather     Diabetes Maternal Grandfather     Stroke Maternal Grandfather     Heart Attack Maternal Grandfather     Heart Failure Maternal Grandfather     Alcohol abuse Brother      Drug/Alcohol abuse    Substance Abuse Maternal Grandmother      Tobacco use - MGM, MGF, and brother,    Diabetes Maternal Grandmother     Hypertension Brother     High Cholesterol Brother     Diabetes Sister     Diabetes Paternal Grandmother     Heart Attack Brother     Heart Failure Brother          OBJECTIVE    Physical Exam:     Visit Vitals    /78 (BP 1 Location: Left arm, BP Patient Position: Sitting)    Pulse 84    Temp 98.3 °F (36.8 °C) (Oral)    Resp 16    Ht 5' 3.5\" (1.613 m)    Wt 236 lb (107 kg)    LMP 10/13/2017 (Approximate)    SpO2 98%    BMI 41.15 kg/m2       General: alert, obese, AA, in no apparent distress or pain  Neck: supple, no adenopathy palpated  CVS: normal rate, regular rhythm, distinct S1 and S2  Lungs:clear to ausculation bilaterally, no crackles, wheezing or rhonchi noted  Extremities: no edema, no cyanosis, feet no lesions normal monofilament. Skin: warm, mild abrasion L antecubital area, does not appear infected.    Psych:  mood and affect normal    Results for orders placed or performed during the hospital encounter of 11/09/17   HEMOGLOBIN A1C W/O EAG   Result Value Ref Range Hemoglobin A1c 7.0 (H) 4.2 - 5.6 %   METABOLIC PANEL, COMPREHENSIVE   Result Value Ref Range    Sodium 141 136 - 145 mmol/L    Potassium 4.0 3.5 - 5.5 mmol/L    Chloride 107 100 - 108 mmol/L    CO2 26 21 - 32 mmol/L    Anion gap 8 3.0 - 18 mmol/L    Glucose 97 74 - 99 mg/dL    BUN 14 7.0 - 18 MG/DL    Creatinine 0.65 0.6 - 1.3 MG/DL    BUN/Creatinine ratio 22 (H) 12 - 20      GFR est AA >60 >60 ml/min/1.73m2    GFR est non-AA >60 >60 ml/min/1.73m2    Calcium 8.9 8.5 - 10.1 MG/DL    Bilirubin, total 0.3 0.2 - 1.0 MG/DL    ALT (SGPT) 22 13 - 56 U/L    AST (SGOT) 16 15 - 37 U/L    Alk. phosphatase 62 45 - 117 U/L    Protein, total 7.6 6.4 - 8.2 g/dL    Albumin 3.5 3.4 - 5.0 g/dL    Globulin 4.1 (H) 2.0 - 4.0 g/dL    A-G Ratio 0.9 0.8 - 1.7     LIPID PANEL   Result Value Ref Range    LIPID PROFILE          Cholesterol, total 145 <200 MG/DL    Triglyceride 165 (H) <150 MG/DL    HDL Cholesterol 39 (L) 40 - 60 MG/DL    LDL, calculated 73 0 - 100 MG/DL    VLDL, calculated 33 MG/DL    CHOL/HDL Ratio 3.7 0 - 5.0     CBC WITH AUTOMATED DIFF   Result Value Ref Range    WBC 4.4 (L) 4.6 - 13.2 K/uL    RBC 3.59 (L) 4.20 - 5.30 M/uL    HGB 10.9 (L) 12.0 - 16.0 g/dL    HCT 33.7 (L) 35.0 - 45.0 %    MCV 93.9 74.0 - 97.0 FL    MCH 30.4 24.0 - 34.0 PG    MCHC 32.3 31.0 - 37.0 g/dL    RDW 14.5 11.6 - 14.5 %    PLATELET 158 448 - 438 K/uL    MPV 9.9 9.2 - 11.8 FL    NEUTROPHILS 38 (L) 40 - 73 %    LYMPHOCYTES 52 21 - 52 %    MONOCYTES 9 3 - 10 %    EOSINOPHILS 1 0 - 5 %    BASOPHILS 0 0 - 2 %    ABS. NEUTROPHILS 1.7 (L) 1.8 - 8.0 K/UL    ABS. LYMPHOCYTES 2.3 0.9 - 3.6 K/UL    ABS. MONOCYTES 0.4 0.05 - 1.2 K/UL    ABS. EOSINOPHILS 0.0 0.0 - 0.4 K/UL    ABS.  BASOPHILS 0.0 0.0 - 0.1 K/UL    DF AUTOMATED     VITAMIN D, 25 HYDROXY   Result Value Ref Range    Vitamin D 25-Hydroxy 19.6 (L) 30 - 100 ng/mL   HEPATITIS B SURF AB QUANT   Result Value Ref Range    Hepatitis B surf Ab, QT <3.1 (L) Immunity>9.9 mIU/mL   MUMPS AB, IGG   Result Value Ref Range    Mumps Abs, IgG 210.0 Immune >10.9 AU/mL   RUBELLA AB, IGG   Result Value Ref Range    Rubella IgG, QL IMMUNE IMM     RUBEOLA AB, IGG   Result Value Ref Range    Rubeola Ab, IgG 259.0 Immune >29.9 AU/mL   HEPATITIS C AB   Result Value Ref Range    Hepatitis C virus Ab 0.15 <0.80 Index    Hep C  virus Ab Interp. NEGATIVE  NEG      Hep C  virus Ab comment         HEP B SURFACE AB   Result Value Ref Range    Hepatitis B surface Ab 3.60 (L) >10.0 mIU/mL    Hep B surface Ab Interp. NEGATIVE  (A) POS      Hep B surface Ab comment        Samples with a  value of 10 mIU/mL or greater are considered positive (protective immunity) in accordance with the CDC guidelines. ASSESSMENT/PLAN  Alea was seen today for hand pain. Diagnoses and all orders for this visit:  Diabetes mellitus without complication  Improving 5.0>6.9>5  Cont  metformin to 1000 mg bid  Eye exam-referral placed again, has yet to schedule, pt remidned  Foot exam- 7/17  Microalbumin-11/17 positive, h/o angioedema on ACE. Optimize glucose control for now. Lipid panel -8/17at goal LDL <100, cont lipitor dose 80 mg qhs  a1c -11/17      Essential hypertension  Controlled, cont current regimen  on clonidine/coreg, norvasc, chlorthalidone  Intolerant to ACE  Counseled on smoking cessation    Iron deficiency anemia due to chronic blood loss   likely due to menorrhagia, mild hgb 10.8  Cont care with dr. Cassy Lugo for DUB on OCP   Colonoscopy/egd 4/2016 normal, internal hemorrhoids noted.  .   Cont po fe supplementation    Vitamin D deficiency  persistent  Increase to 50 k weekly     BMI 40.0-44.9, adult (HCC)  Encouraged wt loss    Anxiety and depression  Encouraged ff-up appt with CPA for further evaluation, pt is agreeable, and would schedule herself    Pure hypercholesterolemia-  At goal on lipitor 80 mg qhs, to cont    Tobacco use  Counseled on cessation    Carpal tunnel   refilled gabapentin, wants to hold off on surgery    Myofascial pain  HEP rec'd  Dr Casillas November    Not immune to hepatitis B  Start series 0, 1, 3 months    History of positive PPD  Pt reports treatment  cxr neg    Ff-up in 1 month as nurse visit  Ff-up with me in 3 months    Patient understands plan of care. Patient has provided input and agrees with goals.

## 2017-11-21 NOTE — PROGRESS NOTES
Chief Complaint   Patient presents with    Diabetes    Hypertension    Cholesterol Problem    Vitamin D Deficiency    Results     1. Have you been to the ER, urgent care clinic since your last visit? Hospitalized since your last visit? No    2. Have you seen or consulted any other health care providers outside of the [address] [place] [address] since your last visit? Include any pap smears or colon screening.  No

## 2017-11-23 LAB — VZV IGG SER IA-ACNC: >4000 INDEX

## 2017-11-27 NOTE — PROGRESS NOTES
Patient identified with 2 identifiers (name and ). Patient aware she is immune and she will bring paper in to be updated.

## 2017-12-11 ENCOUNTER — DOCUMENTATION ONLY (OUTPATIENT)
Dept: NEUROLOGY | Age: 42
End: 2017-12-11

## 2017-12-21 ENCOUNTER — TELEPHONE (OUTPATIENT)
Dept: FAMILY MEDICINE CLINIC | Age: 42
End: 2017-12-21

## 2017-12-21 NOTE — TELEPHONE ENCOUNTER
Karolina Torres from [facility] called this morning. She states she received a referral regarding patient. She states patient had her diabetic eye exam on 9/5/17 with Dr. Lashon Dove and is not due again until 9/7/2018. Requested for her to fax our office medical note from that visit. Please call specialty office with any questions.

## 2017-12-23 DIAGNOSIS — M54.6 THORACIC SPINE PAIN: ICD-10-CM

## 2017-12-23 DIAGNOSIS — M47.899 FACET SYNDROME: ICD-10-CM

## 2017-12-23 DIAGNOSIS — M54.2 NECK PAIN: ICD-10-CM

## 2017-12-23 DIAGNOSIS — M54.50 PAIN OF LUMBAR SPINE: ICD-10-CM

## 2017-12-23 DIAGNOSIS — M54.81 OCCIPITAL NEURALGIA, UNSPECIFIED LATERALITY: ICD-10-CM

## 2017-12-23 DIAGNOSIS — M79.18 MYOFASCIAL PAIN: ICD-10-CM

## 2017-12-26 RX ORDER — DICLOFENAC SODIUM 75 MG/1
TABLET, DELAYED RELEASE ORAL
Qty: 60 TAB | Refills: 5 | Status: SHIPPED | OUTPATIENT
Start: 2017-12-26

## 2018-01-11 ENCOUNTER — TELEPHONE (OUTPATIENT)
Dept: FAMILY MEDICINE CLINIC | Age: 43
End: 2018-01-11

## 2018-01-11 NOTE — TELEPHONE ENCOUNTER
Pt called to let DR Mee Lo know that she is not trying to be non-compliant with her appt change from tomorrow to April. She states that she is on a 90 day probation period and can't come till after that. This is an FYI . Please close when done.  Thank you

## 2018-03-05 RX ORDER — ERGOCALCIFEROL 1.25 MG/1
CAPSULE ORAL
Qty: 12 CAP | Refills: 0 | OUTPATIENT
Start: 2018-03-05

## 2018-03-20 RX ORDER — LANOLIN ALCOHOL/MO/W.PET/CERES
CREAM (GRAM) TOPICAL
Qty: 30 TAB | Refills: 5 | Status: SHIPPED | OUTPATIENT
Start: 2018-03-20

## 2018-05-07 ENCOUNTER — OFFICE VISIT (OUTPATIENT)
Dept: FAMILY MEDICINE CLINIC | Age: 43
End: 2018-05-07

## 2018-05-07 ENCOUNTER — HOSPITAL ENCOUNTER (OUTPATIENT)
Dept: LAB | Age: 43
Discharge: HOME | End: 2018-05-07
Payer: MEDICAID

## 2018-05-07 VITALS
HEIGHT: 64 IN | WEIGHT: 239 LBS | RESPIRATION RATE: 16 BRPM | TEMPERATURE: 98.2 F | BODY MASS INDEX: 40.8 KG/M2 | OXYGEN SATURATION: 98 % | DIASTOLIC BLOOD PRESSURE: 80 MMHG | SYSTOLIC BLOOD PRESSURE: 138 MMHG | HEART RATE: 72 BPM

## 2018-05-07 DIAGNOSIS — F41.9 ANXIETY: ICD-10-CM

## 2018-05-07 DIAGNOSIS — D50.0 IRON DEFICIENCY ANEMIA DUE TO CHRONIC BLOOD LOSS: ICD-10-CM

## 2018-05-07 DIAGNOSIS — R80.9 MICROALBUMINURIA: ICD-10-CM

## 2018-05-07 DIAGNOSIS — F33.0 MILD EPISODE OF RECURRENT MAJOR DEPRESSIVE DISORDER (HCC): ICD-10-CM

## 2018-05-07 DIAGNOSIS — E55.9 VITAMIN D DEFICIENCY: ICD-10-CM

## 2018-05-07 DIAGNOSIS — I10 ESSENTIAL HYPERTENSION: ICD-10-CM

## 2018-05-07 DIAGNOSIS — R14.0 ABDOMINAL DISTENSION: ICD-10-CM

## 2018-05-07 DIAGNOSIS — E78.00 PURE HYPERCHOLESTEROLEMIA: ICD-10-CM

## 2018-05-07 DIAGNOSIS — Z72.0 TOBACCO USE: ICD-10-CM

## 2018-05-07 DIAGNOSIS — Z23 ENCOUNTER FOR IMMUNIZATION: ICD-10-CM

## 2018-05-07 LAB
25(OH)D3 SERPL-MCNC: 32.6 NG/ML (ref 30–100)
ANION GAP SERPL CALC-SCNC: 6 MMOL/L (ref 3–18)
BASOPHILS # BLD: 0 K/UL (ref 0–0.1)
BASOPHILS NFR BLD: 0 % (ref 0–2)
BUN SERPL-MCNC: 12 MG/DL (ref 7–18)
BUN/CREAT SERPL: 16 (ref 12–20)
CALCIUM SERPL-MCNC: 8.9 MG/DL (ref 8.5–10.1)
CHLORIDE SERPL-SCNC: 104 MMOL/L (ref 100–108)
CO2 SERPL-SCNC: 27 MMOL/L (ref 21–32)
CREAT SERPL-MCNC: 0.77 MG/DL (ref 0.6–1.3)
DIFFERENTIAL METHOD BLD: ABNORMAL
EOSINOPHIL # BLD: 0.1 K/UL (ref 0–0.4)
EOSINOPHIL NFR BLD: 2 % (ref 0–5)
ERYTHROCYTE [DISTWIDTH] IN BLOOD BY AUTOMATED COUNT: 14.1 % (ref 11.6–14.5)
GLUCOSE SERPL-MCNC: 92 MG/DL (ref 74–99)
HBA1C MFR BLD HPLC: 6.4 %
HCT VFR BLD AUTO: 35.9 % (ref 35–45)
HGB BLD-MCNC: 12.1 G/DL (ref 12–16)
LYMPHOCYTES # BLD: 2.9 K/UL (ref 0.9–3.6)
LYMPHOCYTES NFR BLD: 54 % (ref 21–52)
MCH RBC QN AUTO: 32.1 PG (ref 24–34)
MCHC RBC AUTO-ENTMCNC: 33.7 G/DL (ref 31–37)
MCV RBC AUTO: 95.2 FL (ref 74–97)
MONOCYTES # BLD: 0.6 K/UL (ref 0.05–1.2)
MONOCYTES NFR BLD: 12 % (ref 3–10)
NEUTS SEG # BLD: 1.7 K/UL (ref 1.8–8)
NEUTS SEG NFR BLD: 32 % (ref 40–73)
PLATELET # BLD AUTO: 321 K/UL (ref 135–420)
PMV BLD AUTO: 10.3 FL (ref 9.2–11.8)
POTASSIUM SERPL-SCNC: 3.9 MMOL/L (ref 3.5–5.5)
RBC # BLD AUTO: 3.77 M/UL (ref 4.2–5.3)
SODIUM SERPL-SCNC: 137 MMOL/L (ref 136–145)
WBC # BLD AUTO: 5.4 K/UL (ref 4.6–13.2)

## 2018-05-07 PROCEDURE — 36415 COLL VENOUS BLD VENIPUNCTURE: CPT | Performed by: FAMILY MEDICINE

## 2018-05-07 PROCEDURE — 80048 BASIC METABOLIC PNL TOTAL CA: CPT | Performed by: FAMILY MEDICINE

## 2018-05-07 PROCEDURE — 82306 VITAMIN D 25 HYDROXY: CPT | Performed by: FAMILY MEDICINE

## 2018-05-07 PROCEDURE — 85025 COMPLETE CBC W/AUTO DIFF WBC: CPT | Performed by: FAMILY MEDICINE

## 2018-05-07 RX ORDER — MONTELUKAST SODIUM 10 MG/1
TABLET ORAL
Refills: 2 | COMMUNITY
Start: 2018-05-06

## 2018-05-07 RX ORDER — RANITIDINE 150 MG/1
TABLET, FILM COATED ORAL
Refills: 2 | COMMUNITY
Start: 2018-05-06

## 2018-05-07 RX ORDER — ONDANSETRON 4 MG/1
TABLET, ORALLY DISINTEGRATING ORAL
Refills: 0 | COMMUNITY
Start: 2018-03-19

## 2018-05-07 RX ORDER — SPIRONOLACTONE 25 MG/1
TABLET ORAL
Refills: 3 | COMMUNITY
Start: 2018-05-06

## 2018-05-07 RX ORDER — CHLORTHALIDONE 25 MG/1
TABLET ORAL DAILY
Refills: 3 | COMMUNITY
Start: 2018-05-06

## 2018-05-07 NOTE — PROGRESS NOTES
Alea HayesDariela, 37 y.o.,  female    SUBJECTIVE   ff-up    DM- taking metformin    HTN-taking medications without problems. She continues to smoke. She had cardiac catheterization 7/17 without significant evidence of CAD    HL on lipitor    anemia-secondary to DUB, previous GI work up neg. On OCP, following dr. Debbie Brooks     carpal tunnel b/l responding well to  gabapentin     h/o dx with bipolar disorder/anxiety/depression- has yet to cont care, requesting referral back. Says mood fairly stable recently. Was on wellbutrin/zoloft in the past.     Past month reports increased abdominal girth, abdomen protruding more than usual. No pain, nausea, vomiting, changes to BM, no sob, leg edema.       ROS:  See HPI, all others negative        Patient Active Problem List   Diagnosis Code    HTN (hypertension) I10    Gastroesophageal reflux disease K21.9    Anxiety and depression F41.9, F32.9    Positive H. pylori test A04.8    Iron deficiency anemia D50.9    Vitamin D deficiency E55.9    Tobacco use Z72.0    Bilateral carpal tunnel syndrome G56.03    Pure hypercholesterolemia E78.00    Episodic tension-type headache, not intractable G44.219    BMI 40.0-44.9, adult (HCC) Z68.41    Uncontrolled type 2 diabetes mellitus without complication, without long-term current use of insulin (HCC) E11.65    Microalbuminuria- h/o angioedema on ace R80.9    History of positive PPD R76.11       Current Outpatient Prescriptions   Medication Sig Dispense Refill    chlorthalidone (HYGROTEN) 25 mg tablet   3    spironolactone (ALDACTONE) 25 mg tablet   3    raNITIdine (ZANTAC) 150 mg tablet   2    ondansetron (ZOFRAN ODT) 4 mg disintegrating tablet   0    montelukast (SINGULAIR) 10 mg tablet   2    ferrous sulfate 325 mg (65 mg iron) tablet TAKE ONE TABLET BY MOUTH ONCE DAILY BEFORE BREAKFAST 30 Tab 5    diclofenac EC (VOLTAREN) 75 mg EC tablet TAKE ONE TABLET BY MOUTH TWICE DAILY AS NEEDED 60 Tab 5    gabapentin (NEURONTIN) 400 mg capsule 1 three times a day 90 Cap 3    norethindrone-ethinyl estradiol-iron (MICROGESTIN FE1.5/30) 1.5 mg-30 mcg (21)/75 mg (7) tab Take 1 Tab by mouth daily. Indications: Abnormal Uterine Bleeding 1 Package 12    metFORMIN (GLUCOPHAGE) 1,000 mg tablet Take 1 Tab by mouth two (2) times daily (with meals). 180 Tab 1    atorvastatin (LIPITOR) 80 mg tablet Take 1 Tab by mouth daily. 90 Tab 2    butalbital-acetaminophen-caff (FIORICET) -40 mg per capsule Take 1 tablet every 8 hours as needed for headache 12 Cap 0    cholecalciferol, vitamin D3, (VITAMIN D3) 2,000 unit tab Take  by mouth.  amLODIPine (NORVASC) 10 mg tablet Take 1 Tab by mouth daily. 30 Tab 11    cloNIDine HCl (CATAPRES) 0.1 mg tablet Take 1 Tab by mouth two (2) times a day. 60 Tab 11    carvedilol (COREG) 25 mg tablet Take 1 Tab by mouth two (2) times daily (with meals). 60 Tab 11    albuterol (PROVENTIL, VENTOLIN) 90 mcg/actuation inhaler Take 2 Puffs by inhalation every four (4) hours as needed for Wheezing. 17 g 0    Blood-Glucose Meter (FREESTYLE LITE METER) monitoring kit Check fasting glucose once daily 1 Kit 0    glucose blood VI test strips (FREESTYLE LITE STRIPS) strip Check fasting glucose once daily 100 Strip 2    Lancets misc Check fasting glucose once daily 1 Each 11    sertraline (ZOLOFT) 50 mg tablet Take 75 mg by mouth nightly.  buPROPion XL (WELLBUTRIN XL) 150 mg tablet Take 150 mg by mouth every morning. Allergies   Allergen Reactions    Ace Inhibitors Swelling       Past Medical History:   Diagnosis Date    Abnormal uterine bleeding (AUB)     Anxiety and depression     Asthma     Cardiac echocardiogram 01/27/2017    EF 55-60%. No WMA. Mod conc LVH. Gr 2 DDfx. Mild AI.  Cardiac nuclear imaging test 01/27/2017    Mod risk. Mod area of anterior apical ischemia likely. No infarction. EF 48%. Mild anterior apical hypk.   Equivocal EKG on pharm stress test.  Diabetes (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 repeat in 10 years. 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 [address]     Neuropathy     Other unknown and unspecified cause of morbidity or mortality     Positive H. pylori test 6/9/14    placed on antibiotics and H2 blocker 6/11/14    Positive PPD, treated 2000    PPD positive, treated     Renal duplex 11/17/2016    No significant RA stenosis.  Vitamin D deficiency 6/9/14       Social History     Social History    Marital status:      Spouse name: N/A    Number of children: N/A    Years of education: N/A     Occupational History    Not on file.      Social History Main Topics    Smoking status: Current Every Day Smoker     Packs/day: 0.50     Years: 0.50    Smokeless tobacco: Never Used    Alcohol use 1.2 oz/week     2 Cans of beer per week      Comment: monthly 1 or 2    Drug use: Yes     Special: Marijuana, Cocaine      Comment: Cocaine stopped 2008, Marijuana 4/2/16 usually once a month or two    Sexual activity: Not Currently     Partners: Female, Male     Birth control/ protection: None     Other Topics Concern     Service Yes     [id number]    Blood Transfusions Yes     2012    Caffeine Concern No    Occupational Exposure No    Hobby Hazards No    Sleep Concern No    Stress Concern Yes    Weight Concern No    Special Diet No    Back Care No    Exercise No    Bike Helmet No    Seat Belt No    Self-Exams No     Social History Narrative       Family History   Problem Relation Age of Onset    Hypertension Mother     Diabetes Mother     Depression Mother     Substance Abuse Mother      Tobacco use    Migraines Mother     High Cholesterol Mother     Heart Attack Mother     Heart Failure Mother     Eczema Mother     Arthritis-osteo Mother     Hypertension Father     Diabetes Father     Substance Abuse Father      Tobacco use and drug abuse    Stroke Paternal Aunt     Heart Disease Maternal Grandfather     Hypertension Maternal Grandfather     High Cholesterol Maternal Grandfather     Diabetes Maternal Grandfather     Stroke Maternal Grandfather     Heart Attack Maternal Grandfather     Heart Failure Maternal Grandfather     Alcohol abuse Brother      Drug/Alcohol abuse    Substance Abuse Maternal Grandmother      Tobacco use - MGM, MGF, and brother,    Diabetes Maternal Grandmother     Hypertension Brother     High Cholesterol Brother     Diabetes Sister     Diabetes Paternal Grandmother     Heart Attack Brother     Heart Failure Brother          OBJECTIVE    Physical Exam:     Visit Vitals    /80 (BP 1 Location: Left arm, BP Patient Position: Sitting)    Pulse 72    Temp 98.2 °F (36.8 °C) (Oral)    Resp 16    Ht 5' 3.5\" (1.613 m)    Wt 239 lb (108.4 kg)    SpO2 98%    BMI 41.67 kg/m2       General: alert, obese, AA, in no apparent distress or pain  Neck: supple, no adenopathy palpated  CVS: normal rate, regular rhythm, distinct S1 and S2  Lungs:clear to ausculation bilaterally, no crackles, wheezing or rhonchi noted  Abdomen: soft, non tender, appears distended, neg fluid wave  Psych:  mood and affect normal    Results for orders placed or performed in visit on 05/07/18   AMB POC HEMOGLOBIN A1C   Result Value Ref Range    Hemoglobin A1c (POC) 6.4 %         ASSESSMENT/PLAN  Alea was seen today for hand pain.     Diagnoses and all orders for this visit:  Diabetes mellitus without complication  Improving 8.2>3.7>6>1.1  Cont  metformin to 1000 mg bid  Eye exam-9/17  Foot exam- 8/17  Microalbumin-11/17 positive, h/o angioedema on ACE. Optimize glucose control for now. Lipid panel -8/17at goal LDL <100, cont lipitor dose 80 mg qhs  a1c -5/18      Essential hypertension  Controlled, cont current regimen  on clonidine, coreg, norvasc, chlorthalidone, aldactone  Intolerant to ACE  Counseled on smoking cessation    Iron deficiency anemia due to chronic blood loss   likely due to menorrhagia, mild hgb 10.8  Cont care with dr. Francesco Schmitt for DUB on OCP   Colonoscopy/egd 4/2016 normal, internal hemorrhoids noted. .   Cont po fe supplementation    Vitamin D deficiency  persistent  Increase to 50 k weekly   Check vit d, cbc,     BMI 40.0-44.9, adult (HCC)  Encouraged wt loss    Anxiety and depression  Encouraged ff-up appt with [facility] for further evaluation, pt is agreeable, and would schedule herself    Pure hypercholesterolemia-  At goal on lipitor 80 mg qhs, to cont    Tobacco use  Counseled on cessation    Carpal tunnel  Controlled, on  gabapentin, wants to hold off on surgery    Myofascial pain  HEP rec'd  Dr Karen Agee    Not immune to hepatitis B  Start series 0, 1, 3 months (2nd dose today)    Abdominal distension  Transvaginal US, check ovaries      Ff-up with me in 3 months    Patient understands plan of care. Patient has provided input and agrees with goals.

## 2018-05-07 NOTE — PATIENT INSTRUCTIONS

## 2018-05-07 NOTE — PROGRESS NOTES
Hep B (#2) 1 ml given IM in left deltoid. Lot # [id number], exp date 09/04/2020. Patient tolerated injection well. No adverse reaction noted.

## 2018-05-07 NOTE — MR AVS SNAPSHOT
[address] [facility] [address] 200 [facility] 
[phone] Patient: Ashley Crawford MRN: [id number] :1975 Visit Information Date & Time Provider Department Dept. Phone Encounter #  
 2018  4:00 PM Perla Cuevas, [address] [place] [address] Road [id number] Follow-up Instructions Return in about 3 months (around 2018), or if symptoms worsen or fail to improve. Your Appointments 2018  2:45 PM  
Follow Up with Sil Redding MD  
[facility] ONE [address] [place] [address]) Appt Note: 3 MO F/U; PT CALLED AND RS 18 DUE TO SHE HAS A NEW JOB. PT REQ TO RS FOR THIS NEW DATE AND TIME IN THE MONTH OF MAY. [address] [facility] [id number] [phone]  
  
   
 [address] [facility] [address] 2018  4:20 PM  
Follow Up with Candy Adams MD  
[facility] 1 ([address] [place] [address]) Appt Note: 6mth f/up; 18- lmom plk; 6mth f/up//r/s'd with the patient; $ 0. copay/PB; LVM both #s need to r/s w/ another provider. mg 18; 6mth f/up, previously saw Dr Raul Purdy [id number] [phone] [address] [place] [address] [address] Upcoming Health Maintenance Date Due HEMOGLOBIN A1C Q6M 2018 PAP AKA CERVICAL CYTOLOGY 2018 Influenza Age 5 to Adult 2018 FOOT EXAM Q1 8/3/2018 EYE EXAM RETINAL OR DILATED Q1 2018 MICROALBUMIN Q1 2018 LIPID PANEL Q1 2018 DTaP/Tdap/Td series (2 - Td) 10/25/2026 Allergies as of 2018  Review Complete On: 2018 By: Perla Cuevas MD  
  
 Severity Noted Reaction Type Reactions Ace Inhibitors  2012    Swelling Current Immunizations  Reviewed on 10/25/2016 Name Date Hep B Vaccine (Adult)  Incomplete, 11/21/2017  4:02 PM  
 Influenza Vaccine (Quad) PF 11/9/2017  1:22 PM, 10/25/2016 Influenza Vaccine PF 12/11/2013 Pneumococcal Polysaccharide (PPSV-23) 10/25/2016 Tdap 10/25/2016 Not reviewed this visit You Were Diagnosed With   
  
 Codes Comments Uncontrolled type 2 diabetes mellitus without complication, without long-term current use of insulin ([facility].)    -  Primary ICD-10-CM: E11.65 ICD-9-CM: 250.02 Anxiety     ICD-10-CM: F41.9 ICD-9-CM: 300.00 Mild episode of recurrent major depressive disorder (HCC)     ICD-10-CM: F33.0 ICD-9-CM: 296.31 Essential hypertension     ICD-10-CM: I10 
ICD-9-CM: 401.9 Vitamin D deficiency     ICD-10-CM: E55.9 ICD-9-CM: 268.9 Tobacco use     ICD-10-CM: Z72.0 ICD-9-CM: 305.1 Pure hypercholesterolemia     ICD-10-CM: E78.00 ICD-9-CM: 272.0 Microalbuminuria     ICD-10-CM: R80.9 ICD-9-CM: 791.0 Encounter for immunization     ICD-10-CM: K41 ICD-9-CM: V03.89 Iron deficiency anemia due to chronic blood loss     ICD-10-CM: D50.0 ICD-9-CM: 280.0 Vitals BP Pulse Temp Resp Height(growth percentile) Weight(growth percentile) 138/80 (BP 1 Location: Left arm, BP Patient Position: Sitting) 72 98.2 °F (36.8 °C) (Oral) 16 5' 3.5\" (1.613 m) 239 lb (108.4 kg) SpO2 BMI OB Status Smoking Status 98% 41.67 kg/m2 Having regular periods Current Every Day Smoker Vitals History BMI and BSA Data Body Mass Index Body Surface Area  
 41.67 kg/m 2 2.2 m 2 Preferred Pharmacy Pharmacy Name Phone [address] [place] [address]. [phone] Your Updated Medication List  
  
   
This list is accurate as of 5/7/18  4:45 PM.  Always use your most recent med list.  
  
  
  
  
 albuterol 90 mcg/actuation inhaler Commonly known as:  Tin Casillases Take 2 Puffs by inhalation every four (4) hours as needed for Wheezing. amLODIPine 10 mg tablet Commonly known as:  Acosta Emerald Take 1 Tab by mouth daily. atorvastatin 80 mg tablet Commonly known as:  LIPITOR Take 1 Tab by mouth daily. Blood-Glucose Meter monitoring kit Commonly known as:  FREESTYLE LITE METER Check fasting glucose once daily buPROPion  mg tablet Commonly known as:  Artice January Take 150 mg by mouth every morning. butalbital-acetaminophen-caff -40 mg per capsule Commonly known as:  [employer] Take 1 tablet every 8 hours as needed for headache  
  
 carvedilol 25 mg tablet Commonly known as:  Pink Parrot Take 1 Tab by mouth two (2) times daily (with meals). chlorthalidone 25 mg tablet Commonly known as:  HYGROTEN  
  
 cloNIDine HCl 0.1 mg tablet Commonly known as:  CATAPRES Take 1 Tab by mouth two (2) times a day. diclofenac EC 75 mg EC tablet Commonly known as:  VOLTAREN  
TAKE ONE TABLET BY MOUTH TWICE DAILY AS NEEDED  
  
 ferrous sulfate 325 mg (65 mg iron) tablet TAKE ONE TABLET BY MOUTH ONCE DAILY BEFORE BREAKFAST  
  
 gabapentin 400 mg capsule Commonly known as:  NEURONTIN  
1 three times a day  
  
 glucose blood VI test strips strip Commonly known as:  FREESTYLE LITE STRIPS Check fasting glucose once daily Lancets Misc Check fasting glucose once daily  
  
 metFORMIN 1,000 mg tablet Commonly known as:  GLUCOPHAGE Take 1 Tab by mouth two (2) times daily (with meals). montelukast 10 mg tablet Commonly known as:  SINGULAIR  
  
 norethindrone-ethinyl estradiol-iron 1.5 mg-30 mcg (21)/75 mg (7) Tab Commonly known as:  Amanda Humberto FE1.5/30 Take 1 Tab by mouth daily. Indications: Abnormal Uterine Bleeding  
  
 ondansetron 4 mg disintegrating tablet Commonly known as:  ZOFRAN ODT  
  
 raNITIdine 150 mg tablet Commonly known as:  ZANTAC  
  
 sertraline 50 mg tablet Commonly known as:  ZOLOFT Take 75 mg by mouth nightly. spironolactone 25 mg tablet Commonly known as:  ALDACTONE  
  
 VITAMIN D3 2,000 unit Tab Generic drug:  cholecalciferol (vitamin D3) Take  by mouth. We Performed the Following AMB POC HEMOGLOBIN A1C [81951 CPT(R)] HEPATITIS B VACCINE, ADULT DOSAGE, IM [09447 CPT(R)] Follow-up Instructions Return in about 3 months (around 8/7/2018), or if symptoms worsen or fail to improve. To-Do List   
 05/07/2018 Lab:  CBC WITH AUTOMATED DIFF   
  
 05/07/2018 Lab:  METABOLIC PANEL, BASIC   
  
 05/07/2018 Lab:  VITAMIN D, 25 HYDROXY Patient Instructions DASH Diet: Care Instructions Your Care Instructions The DASH diet is an eating plan that can help lower your blood pressure. DASH stands for Dietary Approaches to Stop Hypertension. Hypertension is high blood pressure. The DASH diet focuses on eating foods that are high in calcium, potassium, and magnesium. These nutrients can lower blood pressure. The foods that are highest in these nutrients are fruits, vegetables, low-fat dairy products, nuts, seeds, and legumes. But taking calcium, potassium, and magnesium supplements instead of eating foods that are high in those nutrients does not have the same effect. The DASH diet also includes whole grains, fish, and poultry. The DASH diet is one of several lifestyle changes your doctor may recommend to lower your high blood pressure. Your doctor may also want you to decrease the amount of sodium in your diet. Lowering sodium while following the DASH diet can lower blood pressure even further than just the DASH diet alone. Follow-up care is a key part of your treatment and safety. Be sure to make and go to all appointments, and call your doctor if you are having problems. It's also a good idea to know your test results and keep a list of the medicines you take. How can you care for yourself at home? Following the DASH diet · Eat 4 to 5 servings of fruit each day. A serving is 1 medium-sized piece of fruit, ½ cup chopped or canned fruit, 1/4 cup dried fruit, or 4 ounces (½ cup) of fruit juice. Choose fruit more often than fruit juice. · Eat 4 to 5 servings of vegetables each day. A serving is 1 cup of lettuce or raw leafy vegetables, ½ cup of chopped or cooked vegetables, or 4 ounces (½ cup) of vegetable juice. Choose vegetables more often than vegetable juice. · Get 2 to 3 servings of low-fat and fat-free dairy each day. A serving is 8 ounces of milk, 1 cup of yogurt, or 1 ½ ounces of cheese. · Eat 6 to 8 servings of grains each day. A serving is 1 slice of bread, 1 ounce of dry cereal, or ½ cup of cooked rice, pasta, or cooked cereal. Try to choose whole-grain products as much as possible. · Limit lean meat, poultry, and fish to 2 servings each day. A serving is 3 ounces, about the size of a deck of cards. · Eat 4 to 5 servings of nuts, seeds, and legumes (cooked dried beans, lentils, and split peas) each week. A serving is 1/3 cup of nuts, 2 tablespoons of seeds, or ½ cup of cooked beans or peas. · Limit fats and oils to 2 to 3 servings each day. A serving is 1 teaspoon of vegetable oil or 2 tablespoons of salad dressing. · Limit sweets and added sugars to 5 servings or less a week. A serving is 1 tablespoon jelly or jam, ½ cup sorbet, or 1 cup of lemonade. · Eat less than 2,300 milligrams (mg) of sodium a day. If you limit your sodium to 1,500 mg a day, you can lower your blood pressure even more. Tips for success · Start small. Do not try to make dramatic changes to your diet all at once. You might feel that you are missing out on your favorite foods and then be more likely to not follow the plan. Make small changes, and stick with them. Once those changes become habit, add a few more changes. · Try some of the following: ¨ Make it a goal to eat a fruit or vegetable at every meal and at snacks. This will make it easy to get the recommended amount of fruits and vegetables each day. ¨ Try yogurt topped with fruit and nuts for a snack or healthy dessert. ¨ Add lettuce, tomato, cucumber, and onion to sandwiches. ¨ Combine a ready-made pizza crust with low-fat mozzarella cheese and lots of vegetable toppings. Try using tomatoes, squash, spinach, broccoli, carrots, cauliflower, and onions. ¨ Have a variety of cut-up vegetables with a low-fat dip as an appetizer instead of chips and dip. ¨ Sprinkle sunflower seeds or chopped almonds over salads. Or try adding chopped walnuts or almonds to cooked vegetables. ¨ Try some vegetarian meals using beans and peas. Add garbanzo or kidney beans to salads. Make burritos and tacos with mashed dahl beans or black beans. Where can you learn more? Go to http://juan ramon-chuy.info/. Enter U278 in the search box to learn more about \"DASH Diet: Care Instructions. \" Current as of: September 21, 2016 Content Version: 11.4 © 9228-1756 [employer]. Care instructions adapted under license by [employer] (which disclaims liability or warranty for this information). If you have questions about a medical condition or this instruction, always ask your healthcare professional. [place] [address] any warranty or liability for your use of this information. Introducing [facility] & HEALTH SERVICES! Dear Alisa Panchal: Thank you for requesting a [employer] account. Our records indicate that you already have an active iRates account. You can access your account anytime at [email or web address] Did you know that you can access your hospital and ER discharge instructions at any time in iRates? You can also review all of your test results from your hospital stay or ER visit. Additional Information If you have questions, please visit the Frequently Asked Questions section of the [employer] website at [email or web address] Enterprise PHP Business Plan. Carnet de Mode/mychart/. Remember, [employer] is NOT to be used for urgent needs. For medical emergencies, dial 911. Now available from your iPhone and Android! Please provide this summary of care documentation to your next provider. Your primary care clinician is listed as Miguel Naylor. If you have any questions after today's visit, please call [phone].

## 2018-05-08 NOTE — PROGRESS NOTES
Anemia and vit levels have improved, now back to normal range  Cont 1000 iu vit d and care with gyne with regards to her irregular bleeding. pls notify pt.

## 2018-05-14 NOTE — PROGRESS NOTES
Contacted patient and verified identity using name and date of birth (2- identifiers). Patient advised anemia and vit levels have improved, now back to normal range. Cont 1000 iu vit d and care with gyne with regards to her irregular bleeding. Patient voiced understanding.

## 2018-05-16 ENCOUNTER — HOSPITAL ENCOUNTER (OUTPATIENT)
Dept: ULTRASOUND IMAGING | Age: 43
Discharge: HOME | End: 2018-05-16
Attending: FAMILY MEDICINE
Payer: MEDICAID

## 2018-05-16 DIAGNOSIS — R14.0 ABDOMINAL DISTENSION: ICD-10-CM

## 2018-05-16 PROCEDURE — 76830 TRANSVAGINAL US NON-OB: CPT

## 2018-05-17 NOTE — PROGRESS NOTES
+ fibroid (benign uterine mass) normal ovaries on US. Advise ff-up with gyne regarding this.  pls notify pt

## 2018-05-22 NOTE — PROGRESS NOTES
Patient identified with 2 identifiers (name and ). Patient aware of + fibroids and normal ovaries on US . Patient advised to see her GYN Dr. Navi Vaughan.

## 2018-05-31 ENCOUNTER — OFFICE VISIT (OUTPATIENT)
Dept: CARDIOLOGY CLINIC | Age: 43
End: 2018-05-31

## 2018-05-31 VITALS
DIASTOLIC BLOOD PRESSURE: 90 MMHG | WEIGHT: 239 LBS | HEART RATE: 87 BPM | HEIGHT: 63 IN | OXYGEN SATURATION: 98 % | SYSTOLIC BLOOD PRESSURE: 138 MMHG | BODY MASS INDEX: 42.35 KG/M2

## 2018-05-31 DIAGNOSIS — E78.5 DYSLIPIDEMIA: ICD-10-CM

## 2018-05-31 DIAGNOSIS — I10 ESSENTIAL HYPERTENSION: ICD-10-CM

## 2018-05-31 DIAGNOSIS — I11.9 HYPERTENSIVE HEART DISEASE WITHOUT HEART FAILURE: Primary | ICD-10-CM

## 2018-05-31 DIAGNOSIS — E11.21 CONTROLLED TYPE 2 DIABETES MELLITUS WITH DIABETIC NEPHROPATHY, WITHOUT LONG-TERM CURRENT USE OF INSULIN (HCC): ICD-10-CM

## 2018-05-31 DIAGNOSIS — E66.01 MORBID OBESITY (HCC): ICD-10-CM

## 2018-05-31 RX ORDER — CLONIDINE HYDROCHLORIDE 0.1 MG/1
0.1 TABLET ORAL 2 TIMES DAILY
Qty: 60 TAB | Refills: 11 | Status: CANCELLED | OUTPATIENT
Start: 2018-05-31

## 2018-05-31 NOTE — PROGRESS NOTES
1. Have you been to the ER, urgent care clinic since your last visit? Hospitalized since your last visit? No    2. Have you seen or consulted any other health care providers outside of the [address] [place] [address] since your last visit? Include any pap smears or colon screening.  No

## 2018-05-31 NOTE — MR AVS SNAPSHOT
[address] [place] [address] [address] [place] [address] [phone] Patient: Luz Howell MRN: [id number] :[id number] Visit Information Date & Time Provider Department Dept. Phone Encounter #  
 2018  4:20 PM Db Ramos MD [facility] [phone] [id number] Your Appointments 2018  4:30 PM  
Office Visit with Shawn Perez MD  
[facility] ([address]) Appt Note: fibroids [address], [address] [facility] [place] [phone]  
  
   
 [address], [address] [facility]  
  
    
 2018  5:30 PM  
FOLLOW UP EXAM with Sammi Mckeon MD  
[address] [facility] ([address] [facility]) Appt Note: routine f/u 3mo [address] [facility] [address] 200 [facility]  
[address] [facility] 200 [facility] Upcoming Health Maintenance Date Due  
 PAP AKA CERVICAL CYTOLOGY 2018 Influenza Age 5 to Adult 2018 FOOT EXAM Q1 8/3/2018 EYE EXAM RETINAL OR DILATED Q1 2018 HEMOGLOBIN A1C Q6M 2018 MICROALBUMIN Q1 2018 LIPID PANEL Q1 2018 DTaP/Tdap/Td series (2 - Td) 10/25/2026 Allergies as of 2018  Review Complete On: 2018 By: Sammi Mckeon MD  
  
 Severity Noted Reaction Type Reactions Ace Inhibitors  2012    Swelling Current Immunizations  Reviewed on 2018 Name Date Hep B Vaccine (Adult) 2018, 2017  4:02 PM  
 Influenza Vaccine (Quad) PF 2017  1:22 PM, 10/25/2016 Influenza Vaccine PF 2013 Pneumococcal Polysaccharide (PPSV-23) 10/25/2016 Tdap 10/25/2016 Not reviewed this visit You Were Diagnosed With   
  
 Codes Comments Essential hypertension    -  Primary ICD-10-CM: I10 
ICD-9-CM: 401.9 Chest pain, unspecified type     ICD-10-CM: R07.9 ICD-9-CM: 786.50 Vitals BP Pulse Height(growth percentile) Weight(growth percentile) SpO2 BMI  
 138/90 87 5' 3\" (1.6 m) 239 lb (108.4 kg) 98% 42.34 kg/m2 OB Status Smoking Status Having regular periods Current Every Day Smoker Vitals History BMI and BSA Data Body Mass Index Body Surface Area  
 42.34 kg/m 2 2.2 m 2 Preferred Pharmacy Pharmacy Name Phone [address] [place] [address]. [phone] Your Updated Medication List  
  
   
This list is accurate as of 5/31/18  5:04 PM.  Always use your most recent med list.  
  
  
  
  
 albuterol 90 mcg/actuation inhaler Commonly known as:  Sarika Bouche Take 2 Puffs by inhalation every four (4) hours as needed for Wheezing. amLODIPine 10 mg tablet Commonly known as:  Leward Ramos Take 1 Tab by mouth daily. atorvastatin 80 mg tablet Commonly known as:  LIPITOR Take 1 Tab by mouth daily. Blood-Glucose Meter monitoring kit Commonly known as:  FREESTYLE LITE METER Check fasting glucose once daily buPROPion  mg tablet Commonly known as:  Roosvelt Blazing Take 150 mg by mouth every morning. butalbital-acetaminophen-caff -40 mg per capsule Commonly known as:  [employer] Take 1 tablet every 8 hours as needed for headache  
  
 carvedilol 25 mg tablet Commonly known as:  Jerl Specter Take 1 Tab by mouth two (2) times daily (with meals). chlorthalidone 25 mg tablet Commonly known as:  HYGROTEN  
daily. diclofenac EC 75 mg EC tablet Commonly known as:  VOLTAREN  
TAKE ONE TABLET BY MOUTH TWICE DAILY AS NEEDED  
  
 ferrous sulfate 325 mg (65 mg iron) tablet TAKE ONE TABLET BY MOUTH ONCE DAILY BEFORE BREAKFAST  
  
 gabapentin 400 mg capsule Commonly known as:  NEURONTIN  
1 three times a day  
  
 glucose blood VI test strips strip Commonly known as:  FREESTYLE LITE STRIPS  
 Check fasting glucose once daily Lancets Misc Check fasting glucose once daily  
  
 metFORMIN 1,000 mg tablet Commonly known as:  GLUCOPHAGE Take 1 Tab by mouth two (2) times daily (with meals). montelukast 10 mg tablet Commonly known as:  SINGULAIR  
  
 norethindrone-ethinyl estradiol-iron 1.5 mg-30 mcg (21)/75 mg (7) Tab Commonly known as:  Josiephine Steffanie FE1.5/30 Take 1 Tab by mouth daily. Indications: Abnormal Uterine Bleeding  
  
 ondansetron 4 mg disintegrating tablet Commonly known as:  ZOFRAN ODT  
  
 raNITIdine 150 mg tablet Commonly known as:  ZANTAC  
  
 sertraline 50 mg tablet Commonly known as:  ZOLOFT Take 75 mg by mouth nightly. spironolactone 25 mg tablet Commonly known as:  ALDACTONE  
  
 VITAMIN D3 2,000 unit Tab Generic drug:  cholecalciferol (vitamin D3) Take  by mouth. We Performed the Following AMB POC EKG ROUTINE W/ 12 LEADS, INTER & REP [76626 CPT(R)] Patient Instructions Stop Clonidine. Introducing [facility] & HEALTH SERVICES! Dear Carolyn Davis: Thank you for requesting a [employer] account. Our records indicate that you already have an active [employer] account. You can access your account anytime at [email or web address] Did you know that you can access your hospital and ER discharge instructions at any time in [employer]? You can also review all of your test results from your hospital stay or ER visit. Additional Information If you have questions, please visit the Frequently Asked Questions section of the [employer] website at [email or web address] Remember, [employer] is NOT to be used for urgent needs. For medical emergencies, dial 911. Now available from your iPhone and Android! Please provide this summary of care documentation to your next provider.  
  
  
 Your primary care clinician is listed as Evie Poole. If you have any questions after today's visit, please call [phone].

## 2018-06-01 PROBLEM — I11.9 HYPERTENSIVE HEART DISEASE WITHOUT HEART FAILURE: Status: ACTIVE | Noted: 2018-06-01

## 2018-06-01 PROBLEM — E78.5 DYSLIPIDEMIA: Status: ACTIVE | Noted: 2018-06-01

## 2018-06-01 PROBLEM — E11.21 CONTROLLED TYPE 2 DIABETES MELLITUS WITH DIABETIC NEPHROPATHY, WITHOUT LONG-TERM CURRENT USE OF INSULIN (HCC): Status: ACTIVE | Noted: 2017-11-09

## 2018-06-01 PROBLEM — E66.01 MORBID OBESITY (HCC): Status: ACTIVE | Noted: 2018-06-01

## 2018-06-01 RX ORDER — AMLODIPINE BESYLATE 10 MG/1
10 TABLET ORAL DAILY
Qty: 90 TAB | Refills: 3 | Status: SHIPPED | OUTPATIENT
Start: 2018-06-01

## 2018-06-01 RX ORDER — CARVEDILOL 6.25 MG/1
25 TABLET ORAL 2 TIMES DAILY WITH MEALS
Qty: 180 TAB | Refills: 3 | Status: SHIPPED | OUTPATIENT
Start: 2018-06-01

## 2018-06-01 NOTE — PROGRESS NOTES
HISTORY OF PRESENT ILLNESS  Kacy High is a 37 y.o. female. HPI    Patient presents for an overdue follow-up office visit. She was previously followed by Dr. Ilia Carpenter. She has a past medical history significant for long-standing hypertension and hypertensive cardiovascular disease. She underwent an echocardiogram in January 2017 which showed significant concentric LVH, preserved EF of 55-60% with evidence of diastolic dysfunction, mild  aortic insufficiency. She also underwent a pharmacologic nuclear stress test in January 2070 which turned out to be a false positive study, likely due to breast attenuation artifact. This led to a cardiac catheterization in July 2017 which did not show any significant obstructive coronary artery disease. She was last seen in the office approximately 10-11 months ago. Since last visit, she denies any new chest pain or pressure. She does complain of ankle swelling at the end of the day but this will improve at night. She also has been having intermittent shortness of breath which is not significantly changed over the past 6-12 months. She also admits that she ran out of some of her blood pressure medications about a month ago, specifically her clonidine and carvedilol. She denies any orthopnea, no PND. No prolonged palpitations, dizziness or syncope. Past Medical History:   Diagnosis Date    Abnormal uterine bleeding (AUB)     Anxiety and depression     Asthma     Cardiac echocardiogram 01/27/2017    EF 55-60%. No WMA. Mod conc LVH. Gr 2 DDfx. Mild AI.  Cardiac nuclear imaging test 01/27/2017    Mod risk. Mod area of anterior apical ischemia likely. No infarction. EF 48%. Mild anterior apical hypk.   Equivocal EKG on pharm stress test.    Diabetes (HCC)     GERD (gastroesophageal reflux disease)     History of blood transfusion     HSV-2 (herpes simplex virus 2) infection 1/2014    CSF     Hx of colonoscopy 04/25/2016    internal hemorrhoids, no polyp dr Jimi Poon. repeat in 10 years. 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 pylori test 6/9/14    placed on antibiotics and H2 blocker 6/11/14    Positive PPD, treated 2000    PPD positive, treated     Renal duplex 11/17/2016    No significant RA stenosis.  S/P cardiac catheterization 07/2017    No significant obstructive coronary disease, EF 55%, LVEDP 15 mmHg    Vitamin D deficiency 6/9/14     Current Outpatient Prescriptions   Medication Sig Dispense Refill    chlorthalidone (HYGROTEN) 25 mg tablet daily. 3    spironolactone (ALDACTONE) 25 mg tablet   3    raNITIdine (ZANTAC) 150 mg tablet   2    ondansetron (ZOFRAN ODT) 4 mg disintegrating tablet   0    montelukast (SINGULAIR) 10 mg tablet   2    ferrous sulfate 325 mg (65 mg iron) tablet TAKE ONE TABLET BY MOUTH ONCE DAILY BEFORE BREAKFAST 30 Tab 5    diclofenac EC (VOLTAREN) 75 mg EC tablet TAKE ONE TABLET BY MOUTH TWICE DAILY AS NEEDED 60 Tab 5    gabapentin (NEURONTIN) 400 mg capsule 1 three times a day 90 Cap 3    norethindrone-ethinyl estradiol-iron (MICROGESTIN FE1.5/30) 1.5 mg-30 mcg (21)/75 mg (7) tab Take 1 Tab by mouth daily. Indications: Abnormal Uterine Bleeding 1 Package 12    metFORMIN (GLUCOPHAGE) 1,000 mg tablet Take 1 Tab by mouth two (2) times daily (with meals). 180 Tab 1    atorvastatin (LIPITOR) 80 mg tablet Take 1 Tab by mouth daily.  90 Tab 2    Blood-Glucose Meter (FREESTYLE LITE METER) monitoring kit Check fasting glucose once daily 1 Kit 0    glucose blood VI test strips (FREESTYLE LITE STRIPS) strip Check fasting glucose once daily 100 Strip 2    Lancets misc Check fasting glucose once daily 1 Each 11    butalbital-acetaminophen-caff (FIORICET) -40 mg per capsule Take 1 tablet every 8 hours as needed for headache 12 Cap 0    cholecalciferol, vitamin D3, (VITAMIN D3) 2,000 unit tab Take  by mouth.  amLODIPine (NORVASC) 10 mg tablet Take 1 Tab by mouth daily. 30 Tab 11    carvedilol (COREG) 25 mg tablet Take 1 Tab by mouth two (2) times daily (with meals). 60 Tab 11    sertraline (ZOLOFT) 50 mg tablet Take 75 mg by mouth nightly.  buPROPion XL (WELLBUTRIN XL) 150 mg tablet Take 150 mg by mouth every morning.  albuterol (PROVENTIL, VENTOLIN) 90 mcg/actuation inhaler Take 2 Puffs by inhalation every four (4) hours as needed for Wheezing.  17 g 0     Allergies   Allergen Reactions    Ace Inhibitors Swelling      Social History   Substance Use Topics    Smoking status: Current Every Day Smoker     Packs/day: 0.50     Years: 0.50    Smokeless tobacco: Never Used    Alcohol use 1.2 oz/week     2 Cans of beer per week      Comment: monthly 1 or 2     Family History   Problem Relation Age of Onset    Hypertension Mother     Diabetes Mother     Depression Mother     Substance Abuse Mother      Tobacco use    Migraines Mother     High Cholesterol Mother     Heart Attack Mother     Heart Failure Mother     Eczema Mother     Arthritis-osteo Mother     Hypertension Father     Diabetes Father     Substance Abuse Father      Tobacco use and drug abuse    Stroke Paternal Aunt     Heart Disease Maternal Grandfather     Hypertension Maternal Grandfather     High Cholesterol Maternal Grandfather     Diabetes Maternal Grandfather     Stroke Maternal Grandfather     Heart Attack Maternal Grandfather     Heart Failure Maternal Grandfather     Alcohol abuse Brother      Drug/Alcohol abuse    Substance Abuse Maternal Grandmother      Tobacco use - MGM, MGF, and brother,    Diabetes Maternal Grandmother     Hypertension Brother     High Cholesterol Brother     Diabetes Sister     Diabetes Paternal Grandmother     Heart Attack Brother     Heart Failure Brother          Review of Systems   Constitutional: Negative for chills, fever and weight loss. HENT: Negative for nosebleeds. Eyes: Negative for blurred vision and double vision. Respiratory: Negative for cough, shortness of breath and wheezing. Cardiovascular: Negative for chest pain, palpitations, orthopnea, claudication, leg swelling and PND. Gastrointestinal: Negative for abdominal pain, heartburn, nausea and vomiting. Genitourinary: Negative for dysuria and hematuria. Musculoskeletal: Negative for falls and myalgias. Skin: Negative for rash. Neurological: Negative for dizziness, focal weakness and headaches. Endo/Heme/Allergies: Does not bruise/bleed easily. Psychiatric/Behavioral: Negative for substance abuse. Visit Vitals    /90    Pulse 87    Ht 5' 3\" (1.6 m)    Wt 108.4 kg (239 lb)    SpO2 98%    BMI 42.34 kg/m2       Physical Exam   Constitutional: She is oriented to person, place, and time. She appears well-developed and well-nourished. HENT:   Head: Normocephalic and atraumatic. Eyes: Conjunctivae are normal.   Neck: Neck supple. No JVD present. Carotid bruit is not present. Cardiovascular: Normal rate, regular rhythm, S1 normal, S2 normal and normal pulses. Exam reveals no gallop. No murmur heard. Pulmonary/Chest: Effort normal and breath sounds normal. She has no wheezes. She has no rales. Abdominal: Soft. Bowel sounds are normal. There is no tenderness. Musculoskeletal: She exhibits no edema. Neurological: She is alert and oriented to person, place, and time. Skin: Skin is warm and dry. EKG: Normal sinus rhythm, left axis deviation, borderline voltage criteria for LVH, poor R-wave progression, no ST or T-wave abnormalities concerning for ischemia.   No change compared to the previous EKG.    ASSESSMENT and PLAN  Encounter Diagnoses   Name Primary?  Hypertensive heart disease without heart failure Yes    Essential hypertension     Dyslipidemia     Controlled type 2 diabetes mellitus with diabetic nephropathy, without long-term current use of insulin (HCC)     Morbid obesity (HCC)      Hypertensive heart disease. No evidence of decompensated heart failure. She last underwent an echocardiogram in January 2070 which showed significant concentric LVH preserved LV systolic function. Her blood pressure is mildly elevated today in the office, however she states is usually been much higher than that in the past.  Since she is not taking her clonidine in over a month, I would just discontinue this medication altogether, would also like to resume her carvedilol at a lower dosage at 6.25 mg twice daily and this can be increased as needed to further control her blood pressure. I will continue the remainder of her medications which include amlodipine, chlorthalidone, and prolactin. Dyslipidemia. Patient is been treated with atorvastatin high dose. This is followed by her PCP. Diabetes mellitus, type II. Patient does have evidence of diabetic nephropathy. This is also followed by her PCP. From a cardiac standpoint her hemoglobin A1c should be less than 7. Morbid obesity. Patient's weight has been fairly stable over the past year. She is encouraged to try and lose much weight as possible with lifestyle modification. Tobacco use disorder. Patient continues to smoke approximately half a pack cigarettes a day. She was encouraged to contemplate smoking cessation. She states she is in the process of trying to quit and is using nicotine patches.     Patient can follow-up in 6 months, sooner if needed

## 2018-06-12 ENCOUNTER — OFFICE VISIT (OUTPATIENT)
Dept: OBGYN CLINIC | Age: 43
End: 2018-06-12

## 2018-06-12 VITALS
TEMPERATURE: 99 F | RESPIRATION RATE: 16 BRPM | HEIGHT: 63 IN | OXYGEN SATURATION: 100 % | DIASTOLIC BLOOD PRESSURE: 90 MMHG | WEIGHT: 236 LBS | BODY MASS INDEX: 41.82 KG/M2 | HEART RATE: 102 BPM | SYSTOLIC BLOOD PRESSURE: 138 MMHG

## 2018-06-12 DIAGNOSIS — D25.1 INTRAMURAL LEIOMYOMA OF UTERUS: Primary | ICD-10-CM

## 2018-06-12 DIAGNOSIS — N93.8 DUB (DYSFUNCTIONAL UTERINE BLEEDING): ICD-10-CM

## 2018-06-12 DIAGNOSIS — R10.2 PELVIC PAIN IN FEMALE: ICD-10-CM

## 2018-06-12 NOTE — PATIENT INSTRUCTIONS
Abnormal Uterine Bleeding: Care Instructions  Your Care Instructions    Abnormal uterine bleeding (AUB) is irregular bleeding from the uterus that is longer or heavier than usual or does not occur at your regular time. Sometimes it is caused by changes in hormone levels. It can also be caused by growths in the uterus, such as fibroids or polyps. Sometimes a cause cannot be found. You may have heavy bleeding when you are not expecting your period. Your doctor may suggest a pregnancy test, if you think you are pregnant. Follow-up care is a key part of your treatment and safety. Be sure to make and go to all appointments, and call your doctor if you are having problems. It's also a good idea to know your test results and keep a list of the medicines you take. How can you care for yourself at home? · Be safe with medicines. Take pain medicines exactly as directed. ¨ If the doctor gave you a prescription medicine for pain, take it as prescribed. ¨ If you are not taking a prescription pain medicine, ask your doctor if you can take an over-the-counter medicine. · You may be low in iron because of blood loss. Eat a balanced diet that is high in iron and vitamin C. Foods rich in iron include red meat, shellfish, eggs, beans, and leafy green vegetables. Talk to your doctor about whether you need to take iron pills or a multivitamin. When should you call for help? Call 911 anytime you think you may need emergency care. For example, call if:  ? · You passed out (lost consciousness). ?Call your doctor now or seek immediate medical care if:  ? · You have new or worse belly or pelvic pain. ? · You have severe vaginal bleeding. ? · You feel dizzy or lightheaded, or you feel like you may faint. ? Watch closely for changes in your health, and be sure to contact your doctor if:  ? · You think you may be pregnant. ? · Your bleeding gets worse. ? · You do not get better as expected.    Where can you learn more?  Go to http://juan ramon-chuy.info/. Enter M154 in the search box to learn more about \"Abnormal Uterine Bleeding: Care Instructions. \"  Current as of: October 13, 2016  Content Version: 11.4  © 9855-4978 [email or web address]. Care instructions adapted under license by [employer] (which disclaims liability or warranty for this information). If you have questions about a medical condition or this instruction, always ask your healthcare professional. [address] any warranty or liability for your use of this information. Abdominal Hysterectomy: What to Expect at [address] [place] [address] can expect to feel better and stronger each day, although you may need pain medicine for a week or two. You may get tired easily or have less energy than usual. This may last for several weeks after surgery. You will probably notice that your belly is swollen and puffy. This is common. The swelling will take several weeks to go down. It may take about 4 to 6 weeks to fully recover. It is important to avoid lifting while you are recovering so that you can heal.  This care sheet gives you a general idea about how long it will take for you to recover. But each person recovers at a different pace. Follow the steps below to get better as quickly as possible. How can you care for yourself at home? Activity  ? · Rest when you feel tired. Getting enough sleep will help you recover. ? · Try to walk each day. Start by walking a little more than you did the day before. Bit by bit, increase the amount you walk. Walking boosts blood flow and helps prevent pneumonia and constipation. ? · Avoid lifting anything that would make you strain. This may include a child, heavy grocery bags and milk containers, a heavy briefcase or backpack, cat litter or dog food bags, or a vacuum .    ? · Avoid strenuous activities, such as biking, jogging, weight lifting, or aerobic exercise, until your doctor says it is okay. ? · You may shower. Pat the cut (incision) dry. Do not take a bath for the first 2 weeks, or until your doctor tells you it is okay. ? · Ask your doctor when you can drive again. ? · You will probably need to take 2 to 4 weeks off from work. It depends on the type of work you do and how you feel. ? · Your doctor will tell you when you can have sex again. Diet  ? · You can eat your normal diet. If your stomach is upset, try bland, low-fat foods like plain rice, broiled chicken, toast, and yogurt. ? · Drink plenty of fluids (unless your doctor tells you not to). ? · You may notice that your bowel movements are not regular right after your surgery. This is common. Try to avoid constipation and straining with bowel movements. You may want to take a fiber supplement every day. If you have not had a bowel movement after a couple of days, ask your doctor about taking a mild laxative. Medicines  ? · Your doctor will tell you if and when you can restart your medicines. He or she will also give you instructions about taking any new medicines. ? · If you take blood thinners, such as warfarin (Coumadin), clopidogrel (Plavix), or aspirin, be sure to talk to your doctor. He or she will tell you if and when to start taking those medicines again. Make sure that you understand exactly what your doctor wants you to do. ? · Be safe with medicines. Take pain medicines exactly as directed. ¨ If the doctor gave you a prescription medicine for pain, take it as prescribed. ¨ If you are not taking a prescription pain medicine, ask your doctor if you can take an over-the-counter medicine. ? · If your doctor prescribed antibiotics, take them as directed. Do not stop taking them just because you feel better. You need to take the full course of antibiotics. ?  · If you think your pain medicine is making you sick to your stomach:  ¨ Take your medicine after meals (unless your doctor has told you not to). ¨ Ask your doctor for a different pain medicine. Incision care  ? · If you have strips of tape on the cut (incision) the doctor made, leave the tape on for a week or until it falls off. Or follow your doctor's instructions for removing the tape. ? · Wash the area daily with warm, soapy water, and pat it dry. Don't use hydrogen peroxide or alcohol, which can slow healing. You may cover the area with a gauze bandage if it weeps or rubs against clothing. Change the bandage every day. ? · Keep the area clean and dry. Other instructions  ? · You may have some light vaginal bleeding. Wear sanitary pads if needed. Do not douche or use tampons. Follow-up care is a key part of your treatment and safety. Be sure to make and go to all appointments, and call your doctor if you are having problems. It's also a good idea to know your test results and keep a list of the medicines you take. When should you call for help? Call 911 anytime you think you may need emergency care. For example, call if:  ? · You passed out (lost consciousness). ? · You have chest pain, are short of breath, or cough up blood. ?Call your doctor now or seek immediate medical care if:  ? · You have pain that does not get better after you take pain medicine. ? · You cannot pass stools or gas. ? · You have vaginal discharge that has increased in amount or smells bad.   ? · You are sick to your stomach or cannot drink fluids. ? · You have loose stitches, or your incision comes open. ? · Bright red blood has soaked through the bandage over your incision. ? · You have signs of infection, such as:  ¨ Increased pain, swelling, warmth, or redness. ¨ Red streaks leading from the incision. ¨ Pus draining from the incision. ¨ A fever. ? · You have bright red vaginal bleeding that soaks one or more pads in an hour, or you have large clots.    ? · You have signs of a blood clot in your leg (called a deep vein thrombosis), such as:  ¨ Pain in your calf, back of the knee, thigh, or groin. ¨ Redness and swelling in your leg. ? Watch closely for changes in your health, and be sure to contact your doctor if you have any problems. Where can you learn more? Go to http://juan ramon-chuy.info/. Enter M280 in the search box to learn more about \"Abdominal Hysterectomy: What to Expect at Home. \"  Current as of: October 13, 2016  Content Version: 11.4  © 0294-9460 [employer]. Care instructions adapted under license by [employer] (which disclaims liability or warranty for this information). If you have questions about a medical condition or this instruction, always ask your healthcare professional. [address] any warranty or liability for your use of this information. Abdominal Myomectomy: Before Your Surgery  What is an abdominal myomectomy? A myomectomy takes out fibroids from the uterus. Uterine fibroids are benign tumors of the uterine wall or muscle. They are not cancer. The surgery is done through a cut the doctor makes in your lower belly. The cut is also called an incision. In many cases, the doctor makes the cut just above the pubic hairline. In other cases, the cut runs from the belly button to the pubic hairline. Both cuts leave a scar. It often fades with time. Most women go home 1 to 4 days later. You can expect to feel better each day. But you may need about 4 to 6 weeks to recover. This surgery should decrease the pain and heavy bleeding that are caused by fibroids. You will still have your uterus. Having this done should not affect your ability to have children. Sometimes it is done to help fertility. But there is a chance that surgery could harm the uterus. This can cause problems with a future pregnancy. Talk to your doctor about this before your surgery. Follow-up care is a key part of your treatment and safety.  Be sure to make and go to all appointments, and call your doctor if you are having problems. It's also a good idea to know your test results and keep a list of the medicines you take. What happens before surgery? ?Surgery can be stressful. This information will help you understand what you can expect. And it will help you safely prepare for surgery. ? Preparing for surgery  ? · Understand exactly what surgery is planned, along with the risks, benefits, and other options. · Tell your doctors ALL the medicines, vitamins, supplements, and herbal remedies you take. Some of these can increase the risk of bleeding or interact with anesthesia. ? · If you take blood thinners, such as warfarin (Coumadin), clopidogrel (Plavix), or aspirin, be sure to talk to your doctor. He or she will tell you if you should stop taking these medicines before your surgery. Make sure that you understand exactly what your doctor wants you to do.   ? · Your doctor will tell you which medicines to take or stop before your surgery. You may need to stop taking certain medicines a week or more before surgery. So talk to your doctor as soon as you can.   ? · If you have an advance directive, let your doctor know. It may include a living will and a durable power of  for health care. Bring a copy to the hospital. If you don't have one, you may want to prepare one. It lets your doctor and loved ones know your health care wishes. Doctors advise that everyone prepare these papers before any type of surgery or procedure. What happens on the day of surgery? · Follow the instructions exactly about when to stop eating and drinking. If you don't, your surgery may be canceled. If your doctor told you to take your medicines on the day of surgery, take them with only a sip of water. ? · Take a bath or shower before you come in for your surgery. Do not apply lotions, perfumes, deodorants, or nail polish. ? · Do not shave the surgical site yourself.    ? · Take off all jewelry and piercings. And take out contact lenses, if you wear them. ? At the hospital or surgery center   · Bring a picture ID. ? · The area for surgery is often marked to make sure there are no errors. ? · You will be kept comfortable and safe by your anesthesia provider. The anesthesia may make you sleep. Or it may just numb the area being worked on. ? · The surgery will take about 2 hours. Going home   · Be sure you have someone to drive you home. Anesthesia and pain medicine make it unsafe for you to drive. ? · You will be given more specific instructions about recovering from your surgery. They will cover things like diet, wound care, follow-up care, driving, and getting back to your normal routine. When should you call your doctor? · You have questions or concerns. ? · You don't understand how to prepare for your surgery. ? · You become ill before the surgery (such as fever, flu, or a cold). ? · You need to reschedule or have changed your mind about having the surgery. Where can you learn more? Go to http://juan ramon-chuy.info/. Enter K658 in the search box to learn more about \"Abdominal Myomectomy: Before Your Surgery. \"  Current as of: October 13, 2016  Content Version: 11.4  © 5892-4016 [employer]. Care instructions adapted under license by [employer] (which disclaims liability or warranty for this information). If you have questions about a medical condition or this instruction, always ask your healthcare professional. [place] [address] any warranty or liability for your use of this information. Uterine Fibroid Embolization: Before Your Procedure  What is uterine fibroid embolization? Uterine fibroid embolization is a treatment to destroy or shrink fibroids. Fibroids are growths on or in your uterus. Sometimes they're called fibroid tumors, but they aren't cancer. You will be awake during the procedure.  You will get medicine to help you relax and to help with pain. First the doctor will put a thin, flexible tube into blood vessels in both of your upper thighs. The tube is called a catheter. Then the doctor sends small particles through the catheter. These particles prevent your fibroids from getting blood. Without blood, the fibroids shrink or die. The treatment usually takes 1 to 3 hours. Most women go home 6 to 24 hours later. You may have some pain for a few hours to a few days. It will probably take about 7 to 10 days to fully recover. This treatment should reduce pain and bleeding. It may also prevent fibroids from growing back. After the treatment, less blood will go to your uterus. Because of this, pregnancy is not recommended. So it's important to talk with your doctor about birth control options. Follow-up care is a key part of your treatment and safety. Be sure to make and go to all appointments, and call your doctor if you are having problems. It's also a good idea to know your test results and keep a list of the medicines you take. What happens before the procedure? ?Preparing for the procedure  ? · Understand exactly what procedure is planned, along with the risks, benefits, and other options. · Tell your doctors ALL the medicines, vitamins, supplements, and herbal remedies you take. Some of these can increase the risk of bleeding or interact with anesthesia. ? · If you take blood thinners, such as warfarin (Coumadin), clopidogrel (Plavix), or aspirin, be sure to talk to your doctor. He or she will tell you if you should stop taking these medicines before your procedure. Make sure that you understand exactly what your doctor wants you to do.   ? · Your doctor will tell you which medicines to take or stop before your procedure. You may need to stop taking certain medicines a week or more before the procedure.  So talk to your doctor as soon as you can.   ? · If you have an advance directive, let your doctor know. It may include a living will and a durable power of  for health care. Bring a copy to the hospital. If you don't have one, you may want to prepare one. It lets your doctor and loved ones know your health care wishes. Doctors advise that everyone prepare these papers before any type of surgery or procedure. Procedures can be stressful. This information will help you understand what you can expect. And it will help you safely prepare for your procedure. What happens on the day of the procedure? · Follow the instructions exactly about when to stop eating and drinking. If you don't, your procedure may be canceled. If your doctor told you to take your medicines on the day of the procedure, take them with only a sip of water. ? · Take a bath or shower before you come in for your procedure. Do not apply lotions, perfumes, deodorants, or nail polish. ? · Take off all jewelry and piercings. And take out contact lenses, if you wear them. ? At the hospital or surgery center   · Bring a picture ID. ? · You will be kept comfortable and safe by your anesthesia provider. You may get medicine that relaxes you or puts you in a light sleep. The area being worked on will be numb. ? · The procedure will take about 1 to 3 hours. Going home   · Be sure you have someone to drive you home. Anesthesia and pain medicine make it unsafe for you to drive. ? · You will be given more specific instructions about recovering from your procedure. They will cover things like diet, wound care, follow-up care, driving, and getting back to your normal routine. When should you call your doctor? · You have questions or concerns. ? · You don't understand how to prepare for your procedure. ? · You become ill before the procedure (such as fever, flu, or a cold). ? · You need to reschedule or have changed your mind about having the procedure. Where can you learn more?   Go to http://susy.info/. Enter [id number] in the search box to learn more about \"Uterine Fibroid Embolization: Before Your Procedure. \"  Current as of: October 13, 2016  Content Version: 11.4  © 6937-9633 [employer]. Care instructions adapted under license by [employer] (which disclaims liability or warranty for this information). If you have questions about a medical condition or this instruction, always ask your healthcare professional. [address] any warranty or liability for your use of this information. Uterine Fibroids: Care Instructions  Your Care Instructions    Uterine fibroids are growths in the uterus. Fibroids aren't cancer. Doctors don't know what causes fibroids. Fibroids are very common in women during their childbearing years. Fibroids can grow on the inside of the uterus, in the muscle wall of the uterus, or near the outside wall of the uterus. In some women, fibroids cause painful cramps and heavy periods. In these cases, taking anti-inflammatory medicines, birth control pills, or using an intrauterine device (IUD) often helps decrease symptoms. Sometimes surgery is needed to treat fibroids. But if you are near menopause, you may want to wait and see if your symptoms get better. Most fibroids shrink and go away after menopause, when your menstrual periods stop completely. Follow-up care is a key part of your treatment and safety. Be sure to make and go to all appointments, and call your doctor if you are having problems. It's also a good idea to know your test results and keep a list of the medicines you take. How can you care for yourself at home? · If your doctor gave you medicine, take it as exactly as prescribed. Be safe with medicines. Call your doctor if you think you are having a problem with your medicine. · Take anti-inflammatory medicines for pain. These include ibuprofen (Advil, Motrin) and naproxen (Aleve). Read and follow all instructions on the label. · Use heat, such as a hot water bottle or a heating pad set on low, or a warm bath to relax tense muscles and relieve cramping. Put a thin cloth between the heating pad and your skin. Never go to sleep with a heating pad on. · Lie down and put a pillow under your knees. Or, lie on your side and bring your knees up to your chest. These positions may help relieve belly pain or pressure. · Keep track of how many sanitary pads or tampons you use each day. · Get at least 30 minutes of exercise on most days of the week. Walking is a good choice. You also may want to do other activities, such as running, swimming, cycling, or playing tennis or team sports. · If you bleed longer than usual or have heavy bleeding, take a daily multivitamin with iron. When should you call for help? Call your doctor now or seek immediate medical care if:  ? · You have severe vaginal bleeding. ? · You have new or worse belly or pelvic pain. ? Watch closely for changes in your health, and be sure to contact your doctor if:  ? · You have unusual vaginal bleeding. ? · You do not get better as expected. Where can you learn more? Go to http://juan ramon-chuy.info/. Enter B121 in the search box to learn more about \"Uterine Fibroids: Care Instructions. \"  Current as of: October 13, 2016  Content Version: 11.4  © 4304-0922 [employer]. Care instructions adapted under license by [employer] (which disclaims liability or warranty for this information). If you have questions about a medical condition or this instruction, always ask your healthcare professional. [address] any warranty or liability for your use of this information.

## 2018-06-12 NOTE — PROGRESS NOTES
Progress Note    Patient: Haseeb Prado  MRN: [id number]  Date: 6/12/2018     Age:  37 y.o.,      Sex: female    YOB: 1975    Haseeb Prado is a 37y.o. year-old female, G 9 P 5 whose last normal menstrual period is because she has been having uncontrolled heavy menses. She has undergone D&C x 2 ,and she is not on any contraceptive. . She has also been diagnosed via US to have a fundal fibroid (4.2x 3.0x4.0 cm). She presents today with complaints of menorrhagia and pelvic pain and she wants something definitive done. She admits that she is not interested in having major surgical procedure. Review of Systems: General, Cardiovascular, Respiratory, Gastrointestinal, Genitourinary, Neuropsychiatry, Musculoskeletal.  Patient admits to hx of multiple medical problems including DM, hypertension, and hypercholesterolemia. General Examination: She is a well-developed, well-nourished female in no acute distress. Abdomen--soft, nontender, and normal active. Pelvic exam--External genitalia and BUS--normal.             Cervix: Normal    Vagina: vaginal discharge normal and physiologic and                               odorless. Uterus: enlarged, 12-14 weeks size, irregular, mobile    Adnexa: normal bimanual exam    Impression. --Symptomatic Uterine Fibroids. Diabetes Mellitus. Hypertension. Plan: -- Uterine Endometrial Ablation discussed. UFE also discussed. MRI of the pelvis will be ordered. Will try to manage her with Microgestin-Fe 28. RTC--prn.     Olive Blackwell MD  June 12, 2018

## 2018-06-26 ENCOUNTER — HOSPITAL ENCOUNTER (OUTPATIENT)
Dept: MRI IMAGING | Age: 43
Discharge: HOME | End: 2018-06-26
Attending: OBSTETRICS & GYNECOLOGY
Payer: COMMERCIAL

## 2018-06-26 VITALS — BODY MASS INDEX: 41.81 KG/M2 | WEIGHT: 236 LBS

## 2018-06-26 DIAGNOSIS — D25.1 INTRAMURAL LEIOMYOMA OF UTERUS: ICD-10-CM

## 2018-06-26 DIAGNOSIS — N93.8 DUB (DYSFUNCTIONAL UTERINE BLEEDING): ICD-10-CM

## 2018-06-26 LAB — CREAT UR-MCNC: 0.7 MG/DL (ref 0.6–1.3)

## 2018-06-26 PROCEDURE — 82565 ASSAY OF CREATININE: CPT

## 2018-06-26 PROCEDURE — A9577 INJ MULTIHANCE: HCPCS | Performed by: OBSTETRICS & GYNECOLOGY

## 2018-06-26 PROCEDURE — 72197 MRI PELVIS W/O & W/DYE: CPT

## 2018-06-26 PROCEDURE — 74011250636 HC RX REV CODE- 250/636: Performed by: OBSTETRICS & GYNECOLOGY

## 2018-06-26 RX ADMIN — GADOBENATE DIMEGLUMINE 10 ML: 529 INJECTION, SOLUTION INTRAVENOUS at 20:01

## 2018-06-26 RX ADMIN — GADOBENATE DIMEGLUMINE 10 ML: 529 INJECTION, SOLUTION INTRAVENOUS at 20:00

## 2018-06-28 NOTE — PROGRESS NOTES
Still multiple fibroids seen. Has had UFE. Will advise her to see Dr Antonietta Corey for [facility] consult. Make an appt for consultation with me.

## 2018-08-02 ENCOUNTER — OFFICE VISIT (OUTPATIENT)
Dept: OBGYN CLINIC | Age: 43
End: 2018-08-02

## 2018-08-02 VITALS
BODY MASS INDEX: 40.82 KG/M2 | HEART RATE: 98 BPM | TEMPERATURE: 98.8 F | OXYGEN SATURATION: 100 % | RESPIRATION RATE: 16 BRPM | HEIGHT: 63 IN | DIASTOLIC BLOOD PRESSURE: 88 MMHG | WEIGHT: 230.4 LBS | SYSTOLIC BLOOD PRESSURE: 125 MMHG

## 2018-08-02 DIAGNOSIS — D25.9 UTERINE LEIOMYOMA, UNSPECIFIED LOCATION: Primary | ICD-10-CM

## 2018-08-02 NOTE — MR AVS SNAPSHOT
[address] [facility] 080, [address] [facility] [address] 
[phone] Patient: Roopa Stein MRN: [id number] :1975 Visit Information Date & Time Provider Department Dept. Phone Encounter #  
 2018  4:30 PM Dimitris Mccarthymomilana [id number] Follow-up Instructions Return if symptoms worsen or fail to improve. Your Appointments 2018  5:30 PM  
FOLLOW UP EXAM with Theresa Mace MD  
[facility] ([address]) Appt Note: routine f/u 3mo [address] [address] [address] [facility] [address] [facility]  
  
    
 2018  4:00 PM  
Follow Up with Gayle Virk MD  
[facility] [place] [address] ([facility]) Appt Note: 6 month follow up [facility] [address] [address] [id number] [phone] [address] [place] [address] [address] Upcoming Health Maintenance Date Due Influenza Age 5 to Adult 2018 PAP AKA CERVICAL CYTOLOGY 2018 Allergies as of 2018  Review Complete On: 2018 By: Macy Dorado MD  
  
 Severity Noted Reaction Type Reactions Ace Inhibitors  2012    Swelling Current Immunizations  Reviewed on 2018 Name Date Hep B Vaccine (Adult) 2018, 2017  4:02 PM  
 Influenza Vaccine (Quad) PF 2017  1:22 PM, 10/25/2016 Influenza Vaccine PF 2013 Pneumococcal Polysaccharide (PPSV-23) 10/25/2016 Tdap 10/25/2016 Not reviewed this visit You Were Diagnosed With   
  
 Codes Comments Uterine leiomyoma, unspecified location    -  Primary ICD-10-CM: D25.9 ICD-9-CM: 218.9 Vitals BP Pulse Temp Resp Height(growth percentile) Weight(growth percentile) 125/88 (BP 1 Location: Left arm, BP Patient Position: Sitting) 98 98.8 °F (37.1 °C) (Oral) 16 5' 3\" (1.6 m) 230 lb 6.4 oz (104.5 kg) SpO2 BMI OB Status Smoking Status 100% 40.81 kg/m2 Having regular periods Current Every Day Smoker BMI and BSA Data Body Mass Index Body Surface Area  
 40.81 kg/m 2 2.16 m 2 Preferred Pharmacy Pharmacy Name Phone [address] [place] [address]. [phone] Your Updated Medication List  
  
   
This list is accurate as of 8/2/18  6:11 PM.  Always use your most recent med list.  
  
  
  
  
 albuterol 90 mcg/actuation inhaler Commonly known as:  Cochrane Course Take 2 Puffs by inhalation every four (4) hours as needed for Wheezing. amLODIPine 10 mg tablet Commonly known as:  Elliott Mullet Take 1 Tab by mouth daily. atorvastatin 80 mg tablet Commonly known as:  LIPITOR Take 1 Tab by mouth daily. Blood-Glucose Meter monitoring kit Commonly known as:  FREESTYLE LITE METER Check fasting glucose once daily buPROPion  mg tablet Commonly known as:  Olson Cord Take 150 mg by mouth every morning. butalbital-acetaminophen-caff -40 mg per capsule Commonly known as:  [employer] Take 1 tablet every 8 hours as needed for headache  
  
 carvedilol 6.25 mg tablet Commonly known as:  Harpreet Been Take 4 Tabs by mouth two (2) times daily (with meals). chlorthalidone 25 mg tablet Commonly known as:  HYGROTEN  
daily. diclofenac EC 75 mg EC tablet Commonly known as:  VOLTAREN  
TAKE ONE TABLET BY MOUTH TWICE DAILY AS NEEDED  
  
 ferrous sulfate 325 mg (65 mg iron) tablet TAKE ONE TABLET BY MOUTH ONCE DAILY BEFORE BREAKFAST  
  
 gabapentin 400 mg capsule Commonly known as:  NEURONTIN  
1 three times a day  
  
 glucose blood VI test strips strip Commonly known as:  FREESTYLE LITE STRIPS Check fasting glucose once daily Lancets Misc Check fasting glucose once daily  
  
 metFORMIN 1,000 mg tablet Commonly known as:  GLUCOPHAGE Take 1 Tab by mouth two (2) times daily (with meals). montelukast 10 mg tablet Commonly known as:  SINGULAIR  
  
 norethindrone-ethinyl estradiol-iron 1.5 mg-30 mcg (21)/75 mg (7) Tab Commonly known as:  Dorsie Cruz FE1.5/30 Take 1 Tab by mouth daily. Indications: Abnormal Uterine Bleeding  
  
 ondansetron 4 mg disintegrating tablet Commonly known as:  ZOFRAN ODT  
  
 raNITIdine 150 mg tablet Commonly known as:  ZANTAC  
  
 sertraline 50 mg tablet Commonly known as:  ZOLOFT Take 75 mg by mouth nightly. spironolactone 25 mg tablet Commonly known as:  ALDACTONE  
  
 VITAMIN D3 2,000 unit Tab Generic drug:  cholecalciferol (vitamin D3) Take  by mouth. Follow-up Instructions Return if symptoms worsen or fail to improve. Patient Instructions Uterine Fibroid Embolization: Before Your Procedure What is uterine fibroid embolization? Uterine fibroid embolization is a treatment to destroy or shrink fibroids. Fibroids are growths on or in your uterus. Sometimes they're called fibroid tumors, but they aren't cancer. You will be awake during the procedure. You will get medicine to help you relax and to help with pain. First the doctor will put a thin, flexible tube into blood vessels in both of your upper thighs. The tube is called a catheter. Then the doctor sends small particles through the catheter. These particles prevent your fibroids from getting blood. Without blood, the fibroids shrink or die. The treatment usually takes 1 to 3 hours. Most women go home 6 to 24 hours later. You may have some pain for a few hours to a few days. It will probably take about 7 to 10 days to fully recover. This treatment should reduce pain and bleeding. It may also prevent fibroids from growing back.  After the treatment, less blood will go to your uterus. Because of this, pregnancy is not recommended. So it's important to talk with your doctor about birth control options. Follow-up care is a key part of your treatment and safety. Be sure to make and go to all appointments, and call your doctor if you are having problems. It's also a good idea to know your test results and keep a list of the medicines you take. What happens before the procedure? 
 Preparing for the procedure 
  · Understand exactly what procedure is planned, along with the risks, benefits, and other options. · Tell your doctors ALL the medicines, vitamins, supplements, and herbal remedies you take. Some of these can increase the risk of bleeding or interact with anesthesia.  
  · If you take blood thinners, such as warfarin (Coumadin), clopidogrel (Plavix), or aspirin, be sure to talk to your doctor. He or she will tell you if you should stop taking these medicines before your procedure. Make sure that you understand exactly what your doctor wants you to do.  
  · Your doctor will tell you which medicines to take or stop before your procedure. You may need to stop taking certain medicines a week or more before the procedure. So talk to your doctor as soon as you can.  
  · If you have an advance directive, let your doctor know. It may include a living will and a durable power of  for health care. Bring a copy to the hospital. If you don't have one, you may want to prepare one. It lets your doctor and loved ones know your health care wishes. Doctors advise that everyone prepare these papers before any type of surgery or procedure. Procedures can be stressful. This information will help you understand what you can expect. And it will help you safely prepare for your procedure. What happens on the day of the procedure? · Follow the instructions exactly about when to stop eating and drinking. If you don't, your procedure may be canceled.  If your doctor told you to take your medicines on the day of the procedure, take them with only a sip of water.  
  · Take a bath or shower before you come in for your procedure. Do not apply lotions, perfumes, deodorants, or nail polish.  
  · Take off all jewelry and piercings. And take out contact lenses, if you wear them.  
 At the hospital or surgery center · Bring a picture ID.  
  · You will be kept comfortable and safe by your anesthesia provider. You may get medicine that relaxes you or puts you in a light sleep. The area being worked on will be numb.  
  · The procedure will take about 1 to 3 hours. Going home · Be sure you have someone to drive you home. Anesthesia and pain medicine make it unsafe for you to drive.  
  · You will be given more specific instructions about recovering from your procedure. They will cover things like diet, wound care, follow-up care, driving, and getting back to your normal routine. When should you call your doctor? · You have questions or concerns.  
  · You don't understand how to prepare for your procedure.  
  · You become ill before the procedure (such as fever, flu, or a cold).  
  · You need to reschedule or have changed your mind about having the procedure. Where can you learn more? Go to http://juan ramon-chuy.info/. Enter [id number] [phone] [id number] [phone] in the search box to learn more about \"Uterine Fibroid Embolization: Before Your Procedure. \" Current as of: October 6, 2017 Content Version: 11.7 © 5088-1100 [employer] Care instructions adapted under license by [employer] ([employer]) (which disclaims liability or warranty for this information). If you have questions about a medical condition or this instruction, always ask your healthcare professional. [place] [address] any warranty or liability for your use of this information. Abdominal Myomectomy: Before Your Surgery What is an abdominal myomectomy? A myomectomy takes out fibroids from the uterus. Uterine fibroids are benign tumors of the uterine wall or muscle. They are not cancer. The surgery is done through a cut the doctor makes in your lower belly. The cut is also called an incision. In many cases, the doctor makes the cut just above the pubic hairline. In other cases, the cut runs from the belly button to the pubic hairline. Both cuts leave a scar. It often fades with time. Most women go home 1 to 4 days later. You can expect to feel better each day. But you may need about 4 to 6 weeks to recover. This surgery should decrease the pain and heavy bleeding that are caused by fibroids. You will still have your uterus. Having this done should not affect your ability to have children. Sometimes it is done to help fertility. But there is a chance that surgery could harm the uterus. This can cause problems with a future pregnancy. Talk to your doctor about this before your surgery. Follow-up care is a key part of your treatment and safety. Be sure to make and go to all appointments, and call your doctor if you are having problems. It's also a good idea to know your test results and keep a list of the medicines you take. What happens before surgery? 
 Surgery can be stressful. This information will help you understand what you can expect. And it will help you safely prepare for surgery. 
 Preparing for surgery 
  · Understand exactly what surgery is planned, along with the risks, benefits, and other options. · Tell your doctors ALL the medicines, vitamins, supplements, and herbal remedies you take. Some of these can increase the risk of bleeding or interact with anesthesia.  
  · If you take blood thinners, such as warfarin (Coumadin), clopidogrel (Plavix), or aspirin, be sure to talk to your doctor. He or she will tell you if you should stop taking these medicines before your surgery.  Make sure that you understand exactly what your doctor wants you to do.  
  · Your doctor will tell you which medicines to take or stop before your surgery. You may need to stop taking certain medicines a week or more before surgery. So talk to your doctor as soon as you can.  
  · If you have an advance directive, let your doctor know. It may include a living will and a durable power of  for health care. Bring a copy to the hospital. If you don't have one, you may want to prepare one. It lets your doctor and loved ones know your health care wishes. Doctors advise that everyone prepare these papers before any type of surgery or procedure. What happens on the day of surgery? · Follow the instructions exactly about when to stop eating and drinking. If you don't, your surgery may be canceled. If your doctor told you to take your medicines on the day of surgery, take them with only a sip of water.  
  · Take a bath or shower before you come in for your surgery. Do not apply lotions, perfumes, deodorants, or nail polish.  
  · Do not shave the surgical site yourself.  
  · Take off all jewelry and piercings. And take out contact lenses, if you wear them.  
 At the hospital or surgery center · Bring a picture ID.  
  · The area for surgery is often marked to make sure there are no errors.  
  · You will be kept comfortable and safe by your anesthesia provider. The anesthesia may make you sleep. Or it may just numb the area being worked on.  
  · The surgery will take about 2 hours. Going home · Be sure you have someone to drive you home. Anesthesia and pain medicine make it unsafe for you to drive.  
  · You will be given more specific instructions about recovering from your surgery. They will cover things like diet, wound care, follow-up care, driving, and getting back to your normal routine. When should you call your doctor? · You have questions or concerns.   · You don't understand how to prepare for your surgery.  
  · You become ill before the surgery (such as fever, flu, or a cold).  
  · You need to reschedule or have changed your mind about having the surgery. Where can you learn more? Go to http://juan ramon-chuy.info/. Enter N248 in the search box to learn more about \"Abdominal Myomectomy: Before Your Surgery. \" Current as of: Melissa 10, 2017 Content Version: 11.7 © 6986-7863 [employer]. Care instructions adapted under license by [employer] (which disclaims liability or warranty for this information). If you have questions about a medical condition or this instruction, always ask your healthcare professional. [address] any warranty or liability for your use of this information. Uterine Fibroids: Care Instructions Your Care Instructions Uterine fibroids are growths in the uterus. Fibroids aren't cancer. Doctors don't know what causes fibroids. Fibroids are very common in women during their childbearing years. Fibroids can grow on the inside of the uterus, in the muscle wall of the uterus, or near the outside wall of the uterus. In some women, fibroids cause painful cramps and heavy periods. In these cases, taking anti-inflammatory medicines, birth control pills, or using an intrauterine device (IUD) often helps decrease symptoms. Sometimes surgery is needed to treat fibroids. But if you are near menopause, you may want to wait and see if your symptoms get better. Most fibroids shrink and go away after menopause, when your menstrual periods stop completely. Follow-up care is a key part of your treatment and safety. Be sure to make and go to all appointments, and call your doctor if you are having problems. It's also a good idea to know your test results and keep a list of the medicines you take. How can you care for yourself at home? · If your doctor gave you medicine, take it as exactly as prescribed. Be safe with medicines. Call your doctor if you think you are having a problem with your medicine. · Take anti-inflammatory medicines for pain. These include ibuprofen (Advil, Motrin) and naproxen (Aleve). Read and follow all instructions on the label. · Use heat, such as a hot water bottle or a heating pad set on low, or a warm bath to relax tense muscles and relieve cramping. Put a thin cloth between the heating pad and your skin. Never go to sleep with a heating pad on. · Lie down and put a pillow under your knees. Or, lie on your side and bring your knees up to your chest. These positions may help relieve belly pain or pressure. · Keep track of how many sanitary pads or tampons you use each day. · Get at least 30 minutes of exercise on most days of the week. Walking is a good choice. You also may want to do other activities, such as running, swimming, cycling, or playing tennis or team sports. · If you bleed longer than usual or have heavy bleeding, take a daily multivitamin with iron. When should you call for help? Call your doctor now or seek immediate medical care if: 
  · You have severe vaginal bleeding.  
  · You have new or worse belly or pelvic pain.  
 Watch closely for changes in your health, and be sure to contact your doctor if: 
  · You have unusual vaginal bleeding.  
  · You do not get better as expected. Where can you learn more? Go to http://juan ramon-chuy.info/. Enter B121 in the search box to learn more about \"Uterine Fibroids: Care Instructions. \" Current as of: October 6, 2017 Content Version: 11.7 © 7380-2573 [employer]. Care instructions adapted under license by [employer] (which disclaims liability or warranty for this information).  If you have questions about a medical condition or this instruction, always ask your healthcare professional. [employer] disclaims any warranty or liability for your use of this information. Introducing [facility]! Dear Ariela Brought: Thank you for requesting a Snaptee account. Our records indicate that you already have an active Snaptee account. You can access your account anytime at [email or web address] Tech Did you know that you can access your hospital and ER discharge instructions at any time in Snaptee? You can also review all of your test results from your hospital stay or ER visit. Additional Information If you have questions, please visit the Frequently Asked Questions section of the Snaptee website at [email or web address] Remember, Snaptee is NOT to be used for urgent needs. For medical emergencies, dial 911. Now available from your iPhone and Android! Please provide this summary of care documentation to your next provider. Your primary care clinician is listed as Dilshad Mojica. If you have any questions after today's visit, please call [phone].

## 2018-08-02 NOTE — PATIENT INSTRUCTIONS
Uterine Fibroid Embolization: Before Your Procedure  What is uterine fibroid embolization? Uterine fibroid embolization is a treatment to destroy or shrink fibroids. Fibroids are growths on or in your uterus. Sometimes they're called fibroid tumors, but they aren't cancer. You will be awake during the procedure. You will get medicine to help you relax and to help with pain. First the doctor will put a thin, flexible tube into blood vessels in both of your upper thighs. The tube is called a catheter. Then the doctor sends small particles through the catheter. These particles prevent your fibroids from getting blood. Without blood, the fibroids shrink or die. The treatment usually takes 1 to 3 hours. Most women go home 6 to 24 hours later. You may have some pain for a few hours to a few days. It will probably take about 7 to 10 days to fully recover. This treatment should reduce pain and bleeding. It may also prevent fibroids from growing back. After the treatment, less blood will go to your uterus. Because of this, pregnancy is not recommended. So it's important to talk with your doctor about birth control options. Follow-up care is a key part of your treatment and safety. Be sure to make and go to all appointments, and call your doctor if you are having problems. It's also a good idea to know your test results and keep a list of the medicines you take. What happens before the procedure?   Preparing for the procedure    · Understand exactly what procedure is planned, along with the risks, benefits, and other options. · Tell your doctors ALL the medicines, vitamins, supplements, and herbal remedies you take. Some of these can increase the risk of bleeding or interact with anesthesia.     · If you take blood thinners, such as warfarin (Coumadin), clopidogrel (Plavix), or aspirin, be sure to talk to your doctor. He or she will tell you if you should stop taking these medicines before your procedure. Make sure that you understand exactly what your doctor wants you to do.     · Your doctor will tell you which medicines to take or stop before your procedure. You may need to stop taking certain medicines a week or more before the procedure. So talk to your doctor as soon as you can.     · If you have an advance directive, let your doctor know. It may include a living will and a durable power of  for health care. Bring a copy to the hospital. If you don't have one, you may want to prepare one. It lets your doctor and loved ones know your health care wishes. Doctors advise that everyone prepare these papers before any type of surgery or procedure. Procedures can be stressful. This information will help you understand what you can expect. And it will help you safely prepare for your procedure. What happens on the day of the procedure? · Follow the instructions exactly about when to stop eating and drinking. If you don't, your procedure may be canceled. If your doctor told you to take your medicines on the day of the procedure, take them with only a sip of water.     · Take a bath or shower before you come in for your procedure. Do not apply lotions, perfumes, deodorants, or nail polish.     · Take off all jewelry and piercings. And take out contact lenses, if you wear them.    At the hospital or surgery center   · Bring a picture ID.     · You will be kept comfortable and safe by your anesthesia provider. You may get medicine that relaxes you or puts you in a light sleep. The area being worked on will be numb.     · The procedure will take about 1 to 3 hours. Going home   · Be sure you have someone to drive you home. Anesthesia and pain medicine make it unsafe for you to drive.     · You will be given more specific instructions about recovering from your procedure. They will cover things like diet, wound care, follow-up care, driving, and getting back to your normal routine.    When should you call your doctor? · You have questions or concerns.     · You don't understand how to prepare for your procedure.     · You become ill before the procedure (such as fever, flu, or a cold).     · You need to reschedule or have changed your mind about having the procedure. Where can you learn more? Go to http://juan ramon-chuy.info/. Enter [id number] [id number] in the search box to learn more about \"Uterine Fibroid Embolization: Before Your Procedure. \"  Current as of: October 6, 2017  Content Version: 11.7  © 5613-3468 [employer]. Care instructions adapted under license by [employer] (which disclaims liability or warranty for this information). If you have questions about a medical condition or this instruction, always ask your healthcare professional. [address] any warranty or liability for your use of this information. Abdominal Myomectomy: Before Your Surgery  What is an abdominal myomectomy? A myomectomy takes out fibroids from the uterus. Uterine fibroids are benign tumors of the uterine wall or muscle. They are not cancer. The surgery is done through a cut the doctor makes in your lower belly. The cut is also called an incision. In many cases, the doctor makes the cut just above the pubic hairline. In other cases, the cut runs from the belly button to the pubic hairline. Both cuts leave a scar. It often fades with time. Most women go home 1 to 4 days later. You can expect to feel better each day. But you may need about 4 to 6 weeks to recover. This surgery should decrease the pain and heavy bleeding that are caused by fibroids. You will still have your uterus. Having this done should not affect your ability to have children. Sometimes it is done to help fertility. But there is a chance that surgery could harm the uterus. This can cause problems with a future pregnancy. Talk to your doctor about this before your surgery.   Follow-up care is a key part of your treatment and safety. Be sure to make and go to all appointments, and call your doctor if you are having problems. It's also a good idea to know your test results and keep a list of the medicines you take. What happens before surgery?   Surgery can be stressful. This information will help you understand what you can expect. And it will help you safely prepare for surgery.   Preparing for surgery    · Understand exactly what surgery is planned, along with the risks, benefits, and other options. · Tell your doctors ALL the medicines, vitamins, supplements, and herbal remedies you take. Some of these can increase the risk of bleeding or interact with anesthesia.     · If you take blood thinners, such as warfarin (Coumadin), clopidogrel (Plavix), or aspirin, be sure to talk to your doctor. He or she will tell you if you should stop taking these medicines before your surgery. Make sure that you understand exactly what your doctor wants you to do.     · Your doctor will tell you which medicines to take or stop before your surgery. You may need to stop taking certain medicines a week or more before surgery. So talk to your doctor as soon as you can.     · If you have an advance directive, let your doctor know. It may include a living will and a durable power of  for health care. Bring a copy to the hospital. If you don't have one, you may want to prepare one. It lets your doctor and loved ones know your health care wishes. Doctors advise that everyone prepare these papers before any type of surgery or procedure. What happens on the day of surgery? · Follow the instructions exactly about when to stop eating and drinking. If you don't, your surgery may be canceled. If your doctor told you to take your medicines on the day of surgery, take them with only a sip of water.     · Take a bath or shower before you come in for your surgery.  Do not apply lotions, perfumes, deodorants, or nail polish.     · Do not shave the surgical site yourself.     · Take off all jewelry and piercings. And take out contact lenses, if you wear them.    At the hospital or surgery center   · Bring a picture ID.     · The area for surgery is often marked to make sure there are no errors.     · You will be kept comfortable and safe by your anesthesia provider. The anesthesia may make you sleep. Or it may just numb the area being worked on.     · The surgery will take about 2 hours. Going home   · Be sure you have someone to drive you home. Anesthesia and pain medicine make it unsafe for you to drive.     · You will be given more specific instructions about recovering from your surgery. They will cover things like diet, wound care, follow-up care, driving, and getting back to your normal routine. When should you call your doctor? · You have questions or concerns.     · You don't understand how to prepare for your surgery.     · You become ill before the surgery (such as fever, flu, or a cold).     · You need to reschedule or have changed your mind about having the surgery. Where can you learn more? Go to http://jua nramon-chuy.info/. Enter Q944 in the search box to learn more about \"Abdominal Myomectomy: Before Your Surgery. \"  Current as of: Melissa 10, 2017  Content Version: 11.7  © 2393-3912 [employer]. Care instructions adapted under license by [employer] (which disclaims liability or warranty for this information). If you have questions about a medical condition or this instruction, always ask your healthcare professional. [place] [address] any warranty or liability for your use of this information. Uterine Fibroids: Care Instructions  Your Care Instructions    Uterine fibroids are growths in the uterus. Fibroids aren't cancer. Doctors don't know what causes fibroids. Fibroids are very common in women during their childbearing years.   Fibroids can grow on the inside of the uterus, in the muscle wall of the uterus, or near the outside wall of the uterus. In some women, fibroids cause painful cramps and heavy periods. In these cases, taking anti-inflammatory medicines, birth control pills, or using an intrauterine device (IUD) often helps decrease symptoms. Sometimes surgery is needed to treat fibroids. But if you are near menopause, you may want to wait and see if your symptoms get better. Most fibroids shrink and go away after menopause, when your menstrual periods stop completely. Follow-up care is a key part of your treatment and safety. Be sure to make and go to all appointments, and call your doctor if you are having problems. It's also a good idea to know your test results and keep a list of the medicines you take. How can you care for yourself at home? · If your doctor gave you medicine, take it as exactly as prescribed. Be safe with medicines. Call your doctor if you think you are having a problem with your medicine. · Take anti-inflammatory medicines for pain. These include ibuprofen (Advil, Motrin) and naproxen (Aleve). Read and follow all instructions on the label. · Use heat, such as a hot water bottle or a heating pad set on low, or a warm bath to relax tense muscles and relieve cramping. Put a thin cloth between the heating pad and your skin. Never go to sleep with a heating pad on. · Lie down and put a pillow under your knees. Or, lie on your side and bring your knees up to your chest. These positions may help relieve belly pain or pressure. · Keep track of how many sanitary pads or tampons you use each day. · Get at least 30 minutes of exercise on most days of the week. Walking is a good choice. You also may want to do other activities, such as running, swimming, cycling, or playing tennis or team sports. · If you bleed longer than usual or have heavy bleeding, take a daily multivitamin with iron. When should you call for help?   Call your doctor now or seek immediate medical care if:    · You have severe vaginal bleeding.     · You have new or worse belly or pelvic pain.    Watch closely for changes in your health, and be sure to contact your doctor if:    · You have unusual vaginal bleeding.     · You do not get better as expected. Where can you learn more? Go to http://juan ramon-chuy.info/. Enter B121 in the search box to learn more about \"Uterine Fibroids: Care Instructions. \"  Current as of: October 6, 2017  Content Version: 11.7  © 0592-1732 [employer]. Care instructions adapted under license by [employer] (which disclaims liability or warranty for this information). If you have questions about a medical condition or this instruction, always ask your healthcare professional. [address] any warranty or liability for your use of this information.

## 2018-08-02 NOTE — PROGRESS NOTES
Daily Progress Note    Patient: Joseph Medrano  MRN: [id number]  Date: 8/2/2018     Age:  37 y.o.,      Sex: female    YOB: 1975    Joseph Medrano is a 37y.o. year-old female, G 9 P 5  whose last normal menstrual period is unknown . She is currently on oral contraceptive prn to manage the DUB due to the abnormal uterine bleeeding. She has had an MRI which shows multiple fibroids. She presents today to discuss the UFE and to be sent to the IR(Intervention Radiology)    Review of Systems: General, Cardiovascular, Respiratory, Gastrointestinal, Genitourinary, Neuropsychiatry, Musculoskeletal. Patient denies any problems associated with these systems except for the ones in the previous ROS. .    General Examination: She is a well-developed, well-nourished female in no acute distress    Impression. Symptomatic Fibroids. Plan: --Refer to IR for followup. RTC--prn. Time--15 minutes--Refer her to IR and she will be scheduled for UFE. She admits understanding the procedures as explained. 50% of the time was on counseling.     Lalit Rodríguez MD  August 2, 2018

## 2018-08-28 ENCOUNTER — OFFICE VISIT (OUTPATIENT)
Dept: FAMILY MEDICINE CLINIC | Age: 43
End: 2018-08-28

## 2018-08-28 VITALS
HEART RATE: 83 BPM | OXYGEN SATURATION: 98 % | BODY MASS INDEX: 40.04 KG/M2 | HEIGHT: 63 IN | DIASTOLIC BLOOD PRESSURE: 72 MMHG | WEIGHT: 226 LBS | RESPIRATION RATE: 16 BRPM | TEMPERATURE: 98.6 F | SYSTOLIC BLOOD PRESSURE: 108 MMHG

## 2018-08-28 DIAGNOSIS — D50.0 IRON DEFICIENCY ANEMIA DUE TO CHRONIC BLOOD LOSS: ICD-10-CM

## 2018-08-28 DIAGNOSIS — E55.9 VITAMIN D DEFICIENCY: ICD-10-CM

## 2018-08-28 DIAGNOSIS — L60.8 TOENAIL DEFORMITY: ICD-10-CM

## 2018-08-28 DIAGNOSIS — G56.03 BILATERAL CARPAL TUNNEL SYNDROME: ICD-10-CM

## 2018-08-28 DIAGNOSIS — E11.29 CONTROLLED TYPE 2 DIABETES MELLITUS WITH MICROALBUMINURIA, WITHOUT LONG-TERM CURRENT USE OF INSULIN (HCC): ICD-10-CM

## 2018-08-28 DIAGNOSIS — R80.9 MICROALBUMINURIA: ICD-10-CM

## 2018-08-28 DIAGNOSIS — G44.219 EPISODIC TENSION-TYPE HEADACHE, NOT INTRACTABLE: ICD-10-CM

## 2018-08-28 DIAGNOSIS — F33.0 MILD EPISODE OF RECURRENT MAJOR DEPRESSIVE DISORDER (HCC): ICD-10-CM

## 2018-08-28 DIAGNOSIS — E78.00 PURE HYPERCHOLESTEROLEMIA: ICD-10-CM

## 2018-08-28 DIAGNOSIS — R80.9 CONTROLLED TYPE 2 DIABETES MELLITUS WITH MICROALBUMINURIA, WITHOUT LONG-TERM CURRENT USE OF INSULIN (HCC): ICD-10-CM

## 2018-08-28 DIAGNOSIS — F41.9 ANXIETY: ICD-10-CM

## 2018-08-28 DIAGNOSIS — I10 ESSENTIAL HYPERTENSION: Primary | ICD-10-CM

## 2018-08-28 DIAGNOSIS — Z72.0 TOBACCO USE: ICD-10-CM

## 2018-08-28 RX ORDER — DEXTROAMPHETAMINE SACCHARATE, AMPHETAMINE ASPARTATE, DEXTROAMPHETAMINE SULFATE AND AMPHETAMINE SULFATE 5; 5; 5; 5 MG/1; MG/1; MG/1; MG/1
TABLET ORAL
COMMUNITY
Start: 2018-06-19

## 2018-08-28 RX ORDER — BUTALBITAL, ACETAMINOPHEN AND CAFFEINE 300; 40; 50 MG/1; MG/1; MG/1
CAPSULE ORAL
Qty: 12 CAP | Refills: 0 | Status: SHIPPED | OUTPATIENT
Start: 2018-08-28

## 2018-08-28 RX ORDER — HYDROXYZINE 25 MG/1
TABLET, FILM COATED ORAL
COMMUNITY
Start: 2018-06-19 | End: 2018-09-11

## 2018-08-28 NOTE — PROGRESS NOTES
Alea HayesGradyHuitron, 37 y.o.,  female    SUBJECTIVE   ff-up    DM w/ microalbuminuria- taking metformin, h/o angioedema on ACE. She does not check glucose at home. HTN-reviewed cardiology notes, adjusted medication. D/katlyn clonidine, started on bb, and continued other antihypertensives ccb, aldactone, chlorthalidone. She continues to smoke. She had cardiac catheterization 7/17 without significant evidence of CAD    HL on lipitor    anemia-improved, secondary to DUB, previous GI work up neg. Plan for UFE vs EDSON, dr. Francy Franklin following. carpal tunnel b/l responding well to  gabapentin     h/o dx with bipolar disorder/anxiety/depression/adhd- started seeing Dr Aristeo Campos [place], and started on adderall/zoloft, reports improvement of overall mood/work performance. Bilateral big toe thickening over the years, now causing pain with certain shoes.      ROS:  See HPI, all others negative        Patient Active Problem List   Diagnosis Code    HTN (hypertension) I10    Gastroesophageal reflux disease K21.9    Anxiety and depression F41.9, F32.9    Positive H. pylori test A04.8    Iron deficiency anemia D50.9    Vitamin D deficiency E55.9    Tobacco use Z72.0    Bilateral carpal tunnel syndrome G56.03    Pure hypercholesterolemia E78.00    Episodic tension-type headache, not intractable G44.219    BMI 40.0-44.9, adult ([facility]) Z68.41    Controlled type 2 diabetes mellitus with diabetic nephropathy, without long-term current use of insulin ([facility]) E11.21    Microalbuminuria- h/o angioedema on ace R80.9    History of positive PPD R76.11    Dyslipidemia E78.5    Hypertensive heart disease without heart failure I11.9    Morbid obesity ([facility]) E66.01    Controlled type 2 diabetes mellitus with microalbuminuria, without long-term current use of insulin ([facility]) E11.29, R80.9       Current Outpatient Prescriptions   Medication Sig Dispense Refill    dextroamphetamine-amphetamine (ADDERALL) 20 mg tablet 1 tablet in the morning and afternoon (2 times a day)      hydrOXYzine HCl (ATARAX) 25 mg tablet 1 tablet as needed      butalbital-acetaminophen-caff (FIORICET) -40 mg per capsule Take 1 tablet every 8 hours as needed for headache 12 Cap 0    carvedilol (COREG) 6.25 mg tablet Take 4 Tabs by mouth two (2) times daily (with meals). 180 Tab 3    amLODIPine (NORVASC) 10 mg tablet Take 1 Tab by mouth daily. 90 Tab 3    chlorthalidone (HYGROTEN) 25 mg tablet daily. 3    spironolactone (ALDACTONE) 25 mg tablet   3    raNITIdine (ZANTAC) 150 mg tablet   2    ondansetron (ZOFRAN ODT) 4 mg disintegrating tablet   0    montelukast (SINGULAIR) 10 mg tablet   2    ferrous sulfate 325 mg (65 mg iron) tablet TAKE ONE TABLET BY MOUTH ONCE DAILY BEFORE BREAKFAST 30 Tab 5    diclofenac EC (VOLTAREN) 75 mg EC tablet TAKE ONE TABLET BY MOUTH TWICE DAILY AS NEEDED 60 Tab 5    gabapentin (NEURONTIN) 400 mg capsule 1 three times a day 90 Cap 3    norethindrone-ethinyl estradiol-iron (MICROGESTIN FE1.5/30) 1.5 mg-30 mcg (21)/75 mg (7) tab Take 1 Tab by mouth daily. Indications: Abnormal Uterine Bleeding 1 Package 12    metFORMIN (GLUCOPHAGE) 1,000 mg tablet Take 1 Tab by mouth two (2) times daily (with meals). 180 Tab 1    atorvastatin (LIPITOR) 80 mg tablet Take 1 Tab by mouth daily. 90 Tab 2    cholecalciferol, vitamin D3, (VITAMIN D3) 2,000 unit tab Take  by mouth.  sertraline (ZOLOFT) 50 mg tablet Take 75 mg by mouth nightly.  albuterol (PROVENTIL, VENTOLIN) 90 mcg/actuation inhaler Take 2 Puffs by inhalation every four (4) hours as needed for Wheezing.  17 g 0    Blood-Glucose Meter (FREESTYLE LITE METER) monitoring kit Check fasting glucose once daily 1 Kit 0    glucose blood VI test strips (FREESTYLE LITE STRIPS) strip Check fasting glucose once daily 100 Strip 2    Lancets misc Check fasting glucose once daily 1 Each 11       Allergies   Allergen Reactions    Ace Inhibitors Swelling       Past Medical History:   Diagnosis Date    Abnormal uterine bleeding (AUB)     Anxiety and depression     Asthma     Cardiac echocardiogram 01/27/2017    EF 55-60%. No WMA. Mod conc LVH. Gr 2 DDfx. Mild AI.  Cardiac nuclear imaging test 01/27/2017    Mod risk. Mod area of anterior apical ischemia likely. No infarction. EF 48%. Mild anterior apical hypk. Equivocal EKG on pharm stress test.   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 repeat in 10 years. 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 pylori test 6/9/14    placed on antibiotics and H2 blocker 6/11/14    Positive PPD, treated 2000    PPD positive, treated     Renal duplex 11/17/2016    No significant RA stenosis.  S/P cardiac catheterization 07/2017    No significant obstructive coronary disease, EF 55%, LVEDP 15 mmHg    Vitamin D deficiency 6/9/14       Social History     Social History    Marital status:      Spouse name: N/A    Number of children: N/A    Years of education: N/A     Occupational History    Not on file.      Social History Main Topics    Smoking status: Current Every Day Smoker     Packs/day: 0.50     Years: 0.50    Smokeless tobacco: Never Used    Alcohol use 1.2 oz/week     2 Cans of beer per week      Comment: monthly 1 or 2    Drug use: Yes     Special: Marijuana, Cocaine      Comment: Cocaine stopped 2008, Marijuana 4/2/16 usually once a month or two    Sexual activity: Not Currently     Partners: Female, Male     Birth control/ protection: None     Other Topics Concern   [address] Service Yes     [id number]    Blood Transfusions Yes     2012    Caffeine Concern No    Occupational Exposure No    Hobby Hazards No    Sleep Concern No    Stress Concern Yes    Weight Concern No    Special Diet No    Back Care No    Exercise No    Bike Helmet No    Seat Belt No    Self-Exams No     Social History Narrative       Family History   Problem Relation Age of Onset    Hypertension Mother     Diabetes Mother     Depression Mother     Substance Abuse Mother      Tobacco use    Migraines Mother     High Cholesterol Mother     Heart Attack Mother     Heart Failure Mother     Eczema Mother     Arthritis-osteo Mother     Hypertension Father     Diabetes Father     Substance Abuse Father      Tobacco use and drug abuse    Stroke Paternal Aunt     Heart Disease Maternal Grandfather     Hypertension Maternal Grandfather     High Cholesterol Maternal Grandfather     Diabetes Maternal Grandfather     Stroke Maternal Grandfather     Heart Attack Maternal Grandfather     Heart Failure Maternal Grandfather     Alcohol abuse Brother      Drug/Alcohol abuse    Substance Abuse Maternal Grandmother      Tobacco use - MGM, MGF, and brother,    Diabetes Maternal Grandmother     Hypertension Brother     High Cholesterol Brother     Diabetes Sister     Diabetes Paternal Grandmother     Heart Attack Brother     Heart Failure Brother          OBJECTIVE    Physical Exam:     Visit Vitals    /72 (BP 1 Location: Left arm, BP Patient Position: Sitting)    Pulse 83    Temp 98.6 °F (37 °C) (Oral)    Resp 16    Ht 5' 3\" (1.6 m)    Wt 226 lb (102.5 kg)    SpO2 98%    BMI 40.03 kg/m2       General: alert, obese, AA, in no apparent distress or pain  Neck: supple, no adenopathy palpated  CVS: normal rate, regular rhythm, distinct S1 and S2  Lungs:clear to ausculation bilaterally, no crackles, wheezing or rhonchi noted  Abdomen: soft, non tender  Feet: no lesions, normal monofilament  Bilateral great toe thickened. Psych:  mood and affect normal    Results for orders placed or performed during the hospital encounter of 06/26/18   POC CREATININE   Result Value Ref Range    Creatinine, POC 0.7 0.6 - 1.3 MG/DL    GFRAA, POC >60 >60 ml/min/1.73m2    GFRNA, POC >60 >60 ml/min/1.73m2         ASSESSMENT/PLAN  Alea was seen today for hand pain. Diagnoses and all orders for this visit:  Diabetes mellitus without complication  Improving 2.1>0.2>0>2.4  Cont  metformin to 1000 mg bid  Eye exam-9/17- given dr. Maria Savage # to schedule  Foot exam- 8/18  Microalbumin-11/17 positive, h/o angioedema on ACE. Optimize glucose control for now. Lipid panel -8/17at goal LDL <100, cont lipitor dose 80 mg qhs  a1c -5/18  Check cmp/lipid panel/a1c/ microalbumin in 3months    Essential hypertension  Controlled, cont current regimen  on bb, coreg, norvasc, chlorthalidone, aldactone  Intolerant to ACE  Counseled on smoking cessation    Iron deficiency anemia due to chronic blood loss   likely due to menorrhagia,   Improving, hgb 10.08>12.1  Cont care with dr. Linda Singh for DUB considering UFE vs EDSON  Colonoscopy/egd 4/2016 normal, internal hemorrhoids noted.  .   Cont po fe supplementation    Vitamin D deficiency  Improved, cont 1000 iu daily  Check vit d piror to next visit    Anxiety and depression  On zoloft and adderall, now following [facility] ([facility])    Pure hypercholesterolemia-  At goal on lipitor 80 mg qhs, to cont    Tobacco use  Counseled on cessation    Carpal tunnel  Controlled, on  gabapentin, wants to hold off on surgery    Myofascial pain  HEP rec'd  Dr Trenton Chaves    Not immune to hepatitis B  Plan on 3rd dose on next visit    Toenail deformity  Referral to podiatry    BMI 40.0-44.9, adult [facility])  Encouraged wt loss    Ff-up with me in 3 months    Patient understands plan of care. Patient has provided input and agrees with goals.

## 2018-08-28 NOTE — PROGRESS NOTES
1. Have you been to the ER, urgent care clinic since your last visit? Hospitalized since your last visit? No    2. Have you seen or consulted any other health care providers outside of the [address] [place] [address] since your last visit? Include any pap smears or colon screening.  Seeing Dr. Orlando Negrete / Olivier Rangel

## 2018-08-28 NOTE — MR AVS SNAPSHOT
1017 [facility] 250 863 [facility] 
[phone] Patient: Nisa Heredia MRN: [id number] :1975 Visit Information Date & Time Provider Department Dept. Phone Encounter #  
 2018  5:30 PM Juan Pablo Lunsford, 503 [facility] [id number] Follow-up Instructions Return in about 3 months (around 2018), or if symptoms worsen or fail to improve. Your Appointments 2018  4:00 PM  
Follow Up with Bard Denia MD  
[facility] 1 ([address]) Appt Note: 6 month follow up [place] [id number]  
[phone] [facility] [address] Upcoming Health Maintenance Date Due Influenza Age 5 to Adult 2018 FOOT EXAM Q1 8/3/2018 PAP AKA CERVICAL CYTOLOGY 2018 EYE EXAM RETINAL OR DILATED Q1 2018 HEMOGLOBIN A1C Q6M 2018 MICROALBUMIN Q1 2018 LIPID PANEL Q1 2018 DTaP/Tdap/Td series (2 - Td) 10/25/2026 Allergies as of 2018  Review Complete On: 2018 By: Juan Pablo Lunsford MD  
  
 Severity Noted Reaction Type Reactions Ace Inhibitors  2012    Swelling Current Immunizations  Reviewed on 2018 Name Date Hep B Vaccine (Adult) 2018, 2017  4:02 PM  
 Influenza Vaccine (Quad) PF 2017  1:22 PM, 10/25/2016 Influenza Vaccine PF 2013 Pneumococcal Polysaccharide (PPSV-23) 10/25/2016 Tdap 10/25/2016 Not reviewed this visit You Were Diagnosed With   
  
 Codes Comments Essential hypertension    -  Primary ICD-10-CM: I10 
ICD-9-CM: 401.9 BMI 40.0-44.9, adult [facility])     ICD-10-CM: Z68.41 
ICD-9-CM: V85.41 Tobacco use     ICD-10-CM: Z72.0 ICD-9-CM: 305.1 Vitamin D deficiency     ICD-10-CM: E55.9 ICD-9-CM: 268.9 Bilateral carpal tunnel syndrome     ICD-10-CM: G56.03 
ICD-9-CM: 354.0 Pure hypercholesterolemia     ICD-10-CM: E78.00 ICD-9-CM: 272.0 Episodic tension-type headache, not intractable     ICD-10-CM: X31.086 ICD-9-CM: 339.11 Microalbuminuria     ICD-10-CM: R80.9 ICD-9-CM: 791.0 Anxiety     ICD-10-CM: F41.9 ICD-9-CM: 300.00 Mild episode of recurrent major depressive disorder (HCC)     ICD-10-CM: F33.0 ICD-9-CM: 296.31 Iron deficiency anemia due to chronic blood loss     ICD-10-CM: D50.0 ICD-9-CM: 280.0 Controlled type 2 diabetes mellitus with microalbuminuria, without long-term current use of insulin (HCC)     ICD-10-CM: E11.29, R80.9 ICD-9-CM: 250.40, 791.0 Toenail deformity     ICD-10-CM: L60.8 ICD-9-CM: 703.9 Vitals BP Pulse Temp Resp Height(growth percentile) Weight(growth percentile) 108/72 (BP 1 Location: Left arm, BP Patient Position: Sitting) 83 98.6 °F (37 °C) (Oral) 16 5' 3\" (1.6 m) 226 lb (102.5 kg) SpO2 BMI OB Status Smoking Status 98% 40.03 kg/m2 Having regular periods Current Every Day Smoker Vitals History BMI and BSA Data Body Mass Index Body Surface Area 40.03 kg/m 2 2.13 m 2 Preferred Pharmacy Pharmacy Name Phone [address] [phone] Your Updated Medication List  
  
   
This list is accurate as of 8/28/18  6:11 PM.  Always use your most recent med list.  
  
  
  
  
 ADDERALL 20 mg tablet Generic drug:  dextroamphetamine-amphetamine 1 tablet in the morning and afternoon (2 times a day)  
  
 albuterol 90 mcg/actuation inhaler Commonly known as:  [place] Varela Take 2 Puffs by inhalation every four (4) hours as needed for Wheezing. amLODIPine 10 mg tablet Commonly known as:  Arlyss Woodruff Take 1 Tab by mouth daily. atorvastatin 80 mg tablet Commonly known as:  LIPITOR Take 1 Tab by mouth daily. Blood-Glucose Meter monitoring kit Commonly known as:  FREESTYLE LITE METER Check fasting glucose once daily  
  
 butalbital-acetaminophen-caff -40 mg per capsule Commonly known as:  [employer] Take 1 tablet every 8 hours as needed for headache  
  
 carvedilol 6.25 mg tablet Commonly known as:  [employer] [place] Take 4 Tabs by mouth two (2) times daily (with meals). chlorthalidone 25 mg tablet Commonly known as:  HYGROTEN  
daily. diclofenac EC 75 mg EC tablet Commonly known as:  VOLTAREN  
TAKE ONE TABLET BY MOUTH TWICE DAILY AS NEEDED  
  
 ferrous sulfate 325 mg (65 mg iron) tablet TAKE ONE TABLET BY MOUTH ONCE DAILY BEFORE BREAKFAST  
  
 gabapentin 400 mg capsule Commonly known as:  NEURONTIN  
1 three times a day  
  
 glucose blood VI test strips strip Commonly known as:  FREESTYLE LITE STRIPS Check fasting glucose once daily  
  
 hydrOXYzine HCl 25 mg tablet Commonly known as:  ATARAX  
1 tablet as needed Lancets Misc Check fasting glucose once daily  
  
 metFORMIN 1,000 mg tablet Commonly known as:  GLUCOPHAGE Take 1 Tab by mouth two (2) times daily (with meals). montelukast 10 mg tablet Commonly known as:  SINGULAIR  
  
 norethindrone-ethinyl estradiol-iron 1.5 mg-30 mcg (21)/75 mg (7) Tab Commonly known as:  Hayes Melany FE1.5/30 Take 1 Tab by mouth daily. Indications: Abnormal Uterine Bleeding  
  
 ondansetron 4 mg disintegrating tablet Commonly known as:  ZOFRAN ODT  
  
 raNITIdine 150 mg tablet Commonly known as:  ZANTAC  
  
 sertraline 50 mg tablet Commonly known as:  ZOLOFT Take 75 mg by mouth nightly. spironolactone 25 mg tablet Commonly known as:  ALDACTONE  
  
 VITAMIN D3 2,000 unit Tab Generic drug:  cholecalciferol (vitamin D3) Take  by mouth. Prescriptions Sent to Pharmacy  Refills  
 butalbital-acetaminophen-caff (FIORICET) -40 mg per capsule 0  
 Sig: Take 1 tablet every 8 hours as needed for headache Class: Normal  
 Pharmacy: [address] [place]  #: [phone] We Performed the Following REFERRAL TO PODIATRY [REF90 Custom] Follow-up Instructions Return in about 3 months (around 11/28/2018), or if symptoms worsen or fail to improve. To-Do List   
 08/28/2018 Lab:  CBC WITH AUTOMATED DIFF   
  
 08/28/2018 Lab:  HEMOGLOBIN A1C W/O EAG   
  
 08/28/2018 Lab:  LIPID PANEL   
  
 08/28/2018 Lab:  METABOLIC PANEL, COMPREHENSIVE   
  
 08/28/2018 Lab:  MICROALBUMIN, UR, RAND W/ MICROALB/CREAT RATIO   
  
 08/28/2018 Lab:  VITAMIN D, 25 HYDROXY Referral Information Referral ID Referred By Referred To  
  
 [id number] [facility]EP [facility], [address] Phone: [phone] Fax: [phone] Visits Status Start Date End Date 1 New Request 8/28/18 8/28/19 If your referral has a status of pending review or denied, additional information will be sent to support the outcome of this decision. Patient Instructions DASH Diet: Care Instructions Your Care Instructions The DASH diet is an eating plan that can help lower your blood pressure. DASH stands for Dietary Approaches to Stop Hypertension. Hypertension is high blood pressure. The DASH diet focuses on eating foods that are high in calcium, potassium, and magnesium. These nutrients can lower blood pressure. The foods that are highest in these nutrients are fruits, vegetables, low-fat dairy products, nuts, seeds, and legumes. But taking calcium, potassium, and magnesium supplements instead of eating foods that are high in those nutrients does not have the same effect. The DASH diet also includes whole grains, fish, and poultry.  
The DASH diet is one of several lifestyle changes your doctor may recommend to lower your high blood pressure. Your doctor may also want you to decrease the amount of sodium in your diet. Lowering sodium while following the DASH diet can lower blood pressure even further than just the DASH diet alone. Follow-up care is a key part of your treatment and safety. Be sure to make and go to all appointments, and call your doctor if you are having problems. It's also a good idea to know your test results and keep a list of the medicines you take. How can you care for yourself at home? Following the DASH diet · Eat 4 to 5 servings of fruit each day. A serving is 1 medium-sized piece of fruit, ½ cup chopped or canned fruit, 1/4 cup dried fruit, or 4 ounces (½ cup) of fruit juice. Choose fruit more often than fruit juice. · Eat 4 to 5 servings of vegetables each day. A serving is 1 cup of lettuce or raw leafy vegetables, ½ cup of chopped or cooked vegetables, or 4 ounces (½ cup) of vegetable juice. Choose vegetables more often than vegetable juice. · Get 2 to 3 servings of low-fat and fat-free dairy each day. A serving is 8 ounces of milk, 1 cup of yogurt, or 1 ½ ounces of cheese. · Eat 6 to 8 servings of grains each day. A serving is 1 slice of bread, 1 ounce of dry cereal, or ½ cup of cooked rice, pasta, or cooked cereal. Try to choose whole-grain products as much as possible. · Limit lean meat, poultry, and fish to 2 servings each day. A serving is 3 ounces, about the size of a deck of cards. · Eat 4 to 5 servings of nuts, seeds, and legumes (cooked dried beans, lentils, and split peas) each week. A serving is 1/3 cup of nuts, 2 tablespoons of seeds, or ½ cup of cooked beans or peas. · Limit fats and oils to 2 to 3 servings each day. A serving is 1 teaspoon of vegetable oil or 2 tablespoons of salad dressing. · Limit sweets and added sugars to 5 servings or less a week. A serving is 1 tablespoon jelly or jam, ½ cup sorbet, or 1 cup of lemonade. · Eat less than 2,300 milligrams (mg) of sodium a day. If you limit your sodium to 1,500 mg a day, you can lower your blood pressure even more. Tips for success · Start small. Do not try to make dramatic changes to your diet all at once. You might feel that you are missing out on your favorite foods and then be more likely to not follow the plan. Make small changes, and stick with them. Once those changes become habit, add a few more changes. · Try some of the following: ¨ Make it a goal to eat a fruit or vegetable at every meal and at snacks. This will make it easy to get the recommended amount of fruits and vegetables each day. ¨ Try yogurt topped with fruit and nuts for a snack or healthy dessert. ¨ Add lettuce, tomato, cucumber, and onion to sandwiches. ¨ Combine a ready-made pizza crust with low-fat mozzarella cheese and lots of vegetable toppings. Try using tomatoes, squash, spinach, broccoli, carrots, cauliflower, and onions. ¨ Have a variety of cut-up vegetables with a low-fat dip as an appetizer instead of chips and dip. ¨ Sprinkle sunflower seeds or chopped almonds over salads. Or try adding chopped walnuts or almonds to cooked vegetables. ¨ Try some vegetarian meals using beans and peas. Add garbanzo or kidney beans to salads. Make burritos and tacos with mashed dahl beans or black beans. Where can you learn more? Go to http://juan ramon-chuy.info/. Enter P201 in the search box to learn more about \"DASH Diet: Care Instructions. \" Current as of: December 6, 2017 Content Version: 11.7 © 6878-9106 [employer]. Care instructions adapted under license by [employer] (which disclaims liability or warranty for this information). If you have questions about a medical condition or this instruction, always ask your healthcare professional. [address]  any warranty or liability for your use of this information. Introducing [facility]! Dear Krystian Lara: Thank you for requesting a [employer] account. Our records indicate that you already have an active [employer] account. You can access your account anytime at [email or web address] Did you know that you can access your hospital and ER discharge instructions at any time in [employer]? You can also review all of your test results from your hospital stay or ER visit. Additional Information If you have questions, please visit the Frequently Asked Questions section of the [employer] website at [email or web address] Remember, [employer] is NOT to be used for urgent needs. For medical emergencies, dial 911. Now available from your iPhone and Android! Please provide this summary of care documentation to your next provider. Your primary care clinician is listed as Mraiely Cano. If you have any questions after today's visit, please call [phone].

## 2018-08-28 NOTE — PATIENT INSTRUCTIONS

## 2018-09-10 ENCOUNTER — ANESTHESIA EVENT (OUTPATIENT)
Dept: CARDIAC CATH/INVASIVE PROCEDURES | Age: 43
End: 2018-09-10
Payer: SELF-PAY

## 2018-09-10 ENCOUNTER — HOSPITAL ENCOUNTER (OUTPATIENT)
Dept: INTERVENTIONAL RADIOLOGY/VASCULAR | Age: 43
Setting detail: OBSERVATION
Discharge: HOME | End: 2018-09-11
Attending: RADIOLOGY | Admitting: INTERNAL MEDICINE
Payer: SELF-PAY

## 2018-09-10 ENCOUNTER — ANESTHESIA (OUTPATIENT)
Dept: CARDIAC CATH/INVASIVE PROCEDURES | Age: 43
End: 2018-09-10
Payer: SELF-PAY

## 2018-09-10 DIAGNOSIS — D25.0 SUBMUCOUS UTERINE FIBROID: ICD-10-CM

## 2018-09-10 PROBLEM — D21.9 FIBROIDS: Status: ACTIVE | Noted: 2018-09-10

## 2018-09-10 LAB
AMPHET UR QL SCN: NEGATIVE
ANION GAP SERPL CALC-SCNC: 10 MMOL/L (ref 3–18)
APTT PPP: 26.6 SEC (ref 23–36.4)
BARBITURATES UR QL SCN: POSITIVE
BENZODIAZ UR QL: NEGATIVE
BUN SERPL-MCNC: 10 MG/DL (ref 7–18)
BUN/CREAT SERPL: 12 (ref 12–20)
CALCIUM SERPL-MCNC: 9.5 MG/DL (ref 8.5–10.1)
CANNABINOIDS UR QL SCN: NEGATIVE
CHLORIDE SERPL-SCNC: 105 MMOL/L (ref 100–108)
CO2 SERPL-SCNC: 23 MMOL/L (ref 21–32)
COCAINE UR QL SCN: NEGATIVE
CREAT SERPL-MCNC: 0.81 MG/DL (ref 0.6–1.3)
ERYTHROCYTE [DISTWIDTH] IN BLOOD BY AUTOMATED COUNT: 13.5 % (ref 11.6–14.5)
GLUCOSE BLD STRIP.AUTO-MCNC: 124 MG/DL (ref 70–110)
GLUCOSE BLD STRIP.AUTO-MCNC: 136 MG/DL (ref 70–110)
GLUCOSE SERPL-MCNC: 123 MG/DL (ref 74–99)
HCG SERPL QL: NEGATIVE
HCT VFR BLD AUTO: 36.9 % (ref 35–45)
HDSCOM,HDSCOM: ABNORMAL
HGB BLD-MCNC: 13 G/DL (ref 12–16)
INR PPP: 0.9 (ref 0.8–1.2)
MCH RBC QN AUTO: 32.3 PG (ref 24–34)
MCHC RBC AUTO-ENTMCNC: 35.2 G/DL (ref 31–37)
MCV RBC AUTO: 91.8 FL (ref 74–97)
METHADONE UR QL: NEGATIVE
OPIATES UR QL: NEGATIVE
PCP UR QL: NEGATIVE
PLATELET # BLD AUTO: 273 K/UL (ref 135–420)
PMV BLD AUTO: 9.8 FL (ref 9.2–11.8)
POTASSIUM SERPL-SCNC: 3.6 MMOL/L (ref 3.5–5.5)
PROTHROMBIN TIME: 12.3 SEC (ref 11.5–15.2)
RBC # BLD AUTO: 4.02 M/UL (ref 4.2–5.3)
SODIUM SERPL-SCNC: 138 MMOL/L (ref 136–145)
WBC # BLD AUTO: 5 K/UL (ref 4.6–13.2)

## 2018-09-10 PROCEDURE — C1769 GUIDE WIRE: HCPCS

## 2018-09-10 PROCEDURE — 80048 BASIC METABOLIC PNL TOTAL CA: CPT | Performed by: RADIOLOGY

## 2018-09-10 PROCEDURE — 74011250636 HC RX REV CODE- 250/636: Performed by: ANESTHESIOLOGY

## 2018-09-10 PROCEDURE — 82962 GLUCOSE BLOOD TEST: CPT

## 2018-09-10 PROCEDURE — C1760 CLOSURE DEV, VASC: HCPCS

## 2018-09-10 PROCEDURE — C1887 CATHETER, GUIDING: HCPCS

## 2018-09-10 PROCEDURE — 77030019698 HC SYR ANGI MDLON MRTM -A

## 2018-09-10 PROCEDURE — 99218 HC RM OBSERVATION: CPT

## 2018-09-10 PROCEDURE — 74011000250 HC RX REV CODE- 250: Performed by: NURSE ANESTHETIST, CERTIFIED REGISTERED

## 2018-09-10 PROCEDURE — 74011250636 HC RX REV CODE- 250/636

## 2018-09-10 PROCEDURE — 84703 CHORIONIC GONADOTROPIN ASSAY: CPT | Performed by: RADIOLOGY

## 2018-09-10 PROCEDURE — 80307 DRUG TEST PRSMV CHEM ANLYZR: CPT

## 2018-09-10 PROCEDURE — 77030016064 HC PARTIC EMBSPHR BSPH -D

## 2018-09-10 PROCEDURE — 74011250636 HC RX REV CODE- 250/636: Performed by: RADIOLOGY

## 2018-09-10 PROCEDURE — 77030027138 HC INCENT SPIROMETER -A

## 2018-09-10 PROCEDURE — 76060000033 HC ANESTHESIA 1 TO 1.5 HR

## 2018-09-10 PROCEDURE — 85730 THROMBOPLASTIN TIME PARTIAL: CPT | Performed by: RADIOLOGY

## 2018-09-10 PROCEDURE — 36245 INS CATH ABD/L-EXT ART 1ST: CPT

## 2018-09-10 PROCEDURE — 85610 PROTHROMBIN TIME: CPT | Performed by: RADIOLOGY

## 2018-09-10 PROCEDURE — 74011636320 HC RX REV CODE- 636/320: Performed by: RADIOLOGY

## 2018-09-10 PROCEDURE — 85027 COMPLETE CBC AUTOMATED: CPT | Performed by: RADIOLOGY

## 2018-09-10 RX ORDER — MORPHINE SULFATE 2 MG/ML
4 INJECTION, SOLUTION INTRAMUSCULAR; INTRAVENOUS
Status: DISCONTINUED | OUTPATIENT
Start: 2018-09-10 | End: 2018-09-10 | Stop reason: HOSPADM

## 2018-09-10 RX ORDER — ONDANSETRON 2 MG/ML
INJECTION INTRAMUSCULAR; INTRAVENOUS AS NEEDED
Status: DISCONTINUED | OUTPATIENT
Start: 2018-09-10 | End: 2018-09-10 | Stop reason: HOSPADM

## 2018-09-10 RX ORDER — INSULIN LISPRO 100 [IU]/ML
INJECTION, SOLUTION INTRAVENOUS; SUBCUTANEOUS ONCE
Status: DISCONTINUED | OUTPATIENT
Start: 2018-09-10 | End: 2018-09-10 | Stop reason: HOSPADM

## 2018-09-10 RX ORDER — CIPROFLOXACIN 2 MG/ML
400 INJECTION, SOLUTION INTRAVENOUS ONCE
Status: DISPENSED | OUTPATIENT
Start: 2018-09-10 | End: 2018-09-11

## 2018-09-10 RX ORDER — SODIUM CHLORIDE 0.9 % (FLUSH) 0.9 %
5-10 SYRINGE (ML) INJECTION EVERY 8 HOURS
Status: DISCONTINUED | OUTPATIENT
Start: 2018-09-10 | End: 2018-09-10 | Stop reason: HOSPADM

## 2018-09-10 RX ORDER — LIDOCAINE HYDROCHLORIDE 10 MG/ML
INJECTION, SOLUTION EPIDURAL; INFILTRATION; INTRACAUDAL; PERINEURAL
Status: DISPENSED
Start: 2018-09-10 | End: 2018-09-11

## 2018-09-10 RX ORDER — NALOXONE HYDROCHLORIDE 0.4 MG/ML
0.1 INJECTION, SOLUTION INTRAMUSCULAR; INTRAVENOUS; SUBCUTANEOUS AS NEEDED
Status: DISCONTINUED | OUTPATIENT
Start: 2018-09-10 | End: 2018-09-10 | Stop reason: SDUPTHER

## 2018-09-10 RX ORDER — NICOTINE 7MG/24HR
1 PATCH, TRANSDERMAL 24 HOURS TRANSDERMAL EVERY 24 HOURS
Status: DISCONTINUED | OUTPATIENT
Start: 2018-09-10 | End: 2018-09-11 | Stop reason: HOSPADM

## 2018-09-10 RX ORDER — DEXTROSE 50 % IN WATER (D50W) INTRAVENOUS SYRINGE
25-50 AS NEEDED
Status: DISCONTINUED | OUTPATIENT
Start: 2018-09-10 | End: 2018-09-10 | Stop reason: HOSPADM

## 2018-09-10 RX ORDER — LIDOCAINE HYDROCHLORIDE 20 MG/ML
INJECTION, SOLUTION EPIDURAL; INFILTRATION; INTRACAUDAL; PERINEURAL AS NEEDED
Status: DISCONTINUED | OUTPATIENT
Start: 2018-09-10 | End: 2018-09-10 | Stop reason: HOSPADM

## 2018-09-10 RX ORDER — ONDANSETRON 2 MG/ML
4 INJECTION INTRAMUSCULAR; INTRAVENOUS
Status: DISCONTINUED | OUTPATIENT
Start: 2018-09-10 | End: 2018-09-11 | Stop reason: HOSPADM

## 2018-09-10 RX ORDER — NALOXONE HYDROCHLORIDE 0.4 MG/ML
0.4 INJECTION, SOLUTION INTRAMUSCULAR; INTRAVENOUS; SUBCUTANEOUS AS NEEDED
Status: DISCONTINUED | OUTPATIENT
Start: 2018-09-10 | End: 2018-09-11 | Stop reason: HOSPADM

## 2018-09-10 RX ORDER — ONDANSETRON 2 MG/ML
4 INJECTION INTRAMUSCULAR; INTRAVENOUS ONCE
Status: DISCONTINUED | OUTPATIENT
Start: 2018-09-10 | End: 2018-09-10 | Stop reason: HOSPADM

## 2018-09-10 RX ORDER — SODIUM CHLORIDE 0.9 % (FLUSH) 0.9 %
5-10 SYRINGE (ML) INJECTION AS NEEDED
Status: DISCONTINUED | OUTPATIENT
Start: 2018-09-10 | End: 2018-09-10 | Stop reason: HOSPADM

## 2018-09-10 RX ORDER — SODIUM CHLORIDE 9 MG/ML
20 INJECTION, SOLUTION INTRAVENOUS CONTINUOUS
Status: DISCONTINUED | OUTPATIENT
Start: 2018-09-10 | End: 2018-09-11

## 2018-09-10 RX ORDER — SODIUM CHLORIDE 9 MG/ML
125 INJECTION, SOLUTION INTRAVENOUS CONTINUOUS
Status: DISPENSED | OUTPATIENT
Start: 2018-09-10 | End: 2018-09-10

## 2018-09-10 RX ORDER — IODIXANOL 320 MG/ML
200 INJECTION, SOLUTION INTRAVASCULAR
Status: COMPLETED | OUTPATIENT
Start: 2018-09-10 | End: 2018-09-10

## 2018-09-10 RX ORDER — SODIUM CHLORIDE 0.9 % (FLUSH) 0.9 %
5-10 SYRINGE (ML) INJECTION EVERY 8 HOURS
Status: DISCONTINUED | OUTPATIENT
Start: 2018-09-10 | End: 2018-09-11 | Stop reason: HOSPADM

## 2018-09-10 RX ORDER — MAGNESIUM SULFATE 100 %
4 CRYSTALS MISCELLANEOUS AS NEEDED
Status: DISCONTINUED | OUTPATIENT
Start: 2018-09-10 | End: 2018-09-10 | Stop reason: HOSPADM

## 2018-09-10 RX ORDER — SODIUM CHLORIDE 0.9 % (FLUSH) 0.9 %
5-10 SYRINGE (ML) INJECTION AS NEEDED
Status: DISCONTINUED | OUTPATIENT
Start: 2018-09-10 | End: 2018-09-11 | Stop reason: HOSPADM

## 2018-09-10 RX ORDER — ONDANSETRON 2 MG/ML
4 INJECTION INTRAMUSCULAR; INTRAVENOUS
Status: COMPLETED | OUTPATIENT
Start: 2018-09-10 | End: 2018-09-10

## 2018-09-10 RX ORDER — MIDAZOLAM HYDROCHLORIDE 1 MG/ML
INJECTION, SOLUTION INTRAMUSCULAR; INTRAVENOUS AS NEEDED
Status: DISCONTINUED | OUTPATIENT
Start: 2018-09-10 | End: 2018-09-10 | Stop reason: HOSPADM

## 2018-09-10 RX ORDER — SODIUM CHLORIDE, SODIUM LACTATE, POTASSIUM CHLORIDE, CALCIUM CHLORIDE 600; 310; 30; 20 MG/100ML; MG/100ML; MG/100ML; MG/100ML
75 INJECTION, SOLUTION INTRAVENOUS CONTINUOUS
Status: DISCONTINUED | OUTPATIENT
Start: 2018-09-10 | End: 2018-09-10 | Stop reason: HOSPADM

## 2018-09-10 RX ORDER — CIPROFLOXACIN 2 MG/ML
400 INJECTION, SOLUTION INTRAVENOUS ONCE
Status: COMPLETED | OUTPATIENT
Start: 2018-09-10 | End: 2018-09-10

## 2018-09-10 RX ORDER — LIDOCAINE HYDROCHLORIDE 10 MG/ML
30 INJECTION, SOLUTION EPIDURAL; INFILTRATION; INTRACAUDAL; PERINEURAL ONCE
Status: COMPLETED | OUTPATIENT
Start: 2018-09-10 | End: 2018-09-10

## 2018-09-10 RX ORDER — FENTANYL CITRATE-0.9 % NACL/PF 900MCG/30
PATIENT CONTROLLED ANALGESIA VIAL INJECTION
Status: DISCONTINUED | OUTPATIENT
Start: 2018-09-10 | End: 2018-09-11

## 2018-09-10 RX ORDER — DOCUSATE SODIUM 100 MG/1
100 CAPSULE, LIQUID FILLED ORAL 2 TIMES DAILY
Status: DISCONTINUED | OUTPATIENT
Start: 2018-09-10 | End: 2018-09-11 | Stop reason: HOSPADM

## 2018-09-10 RX ORDER — FENTANYL CITRATE 50 UG/ML
25 INJECTION, SOLUTION INTRAMUSCULAR; INTRAVENOUS AS NEEDED
Status: DISCONTINUED | OUTPATIENT
Start: 2018-09-10 | End: 2018-09-10 | Stop reason: HOSPADM

## 2018-09-10 RX ORDER — PROPOFOL 10 MG/ML
INJECTION, EMULSION INTRAVENOUS
Status: DISCONTINUED | OUTPATIENT
Start: 2018-09-10 | End: 2018-09-10 | Stop reason: HOSPADM

## 2018-09-10 RX ORDER — HEPARIN SODIUM 200 [USP'U]/100ML
INJECTION, SOLUTION INTRAVENOUS
Status: DISPENSED
Start: 2018-09-10 | End: 2018-09-11

## 2018-09-10 RX ORDER — LABETALOL HYDROCHLORIDE 5 MG/ML
10 INJECTION, SOLUTION INTRAVENOUS
Status: DISCONTINUED | OUTPATIENT
Start: 2018-09-10 | End: 2018-09-10 | Stop reason: HOSPADM

## 2018-09-10 RX ORDER — HYDROMORPHONE HYDROCHLORIDE 2 MG/ML
INJECTION, SOLUTION INTRAMUSCULAR; INTRAVENOUS; SUBCUTANEOUS
Status: COMPLETED
Start: 2018-09-10 | End: 2018-09-10

## 2018-09-10 RX ORDER — HYDROMORPHONE HYDROCHLORIDE 2 MG/ML
1 INJECTION, SOLUTION INTRAMUSCULAR; INTRAVENOUS; SUBCUTANEOUS
Status: DISCONTINUED | OUTPATIENT
Start: 2018-09-10 | End: 2018-09-11 | Stop reason: HOSPADM

## 2018-09-10 RX ORDER — KETOROLAC TROMETHAMINE 30 MG/ML
INJECTION, SOLUTION INTRAMUSCULAR; INTRAVENOUS AS NEEDED
Status: DISCONTINUED | OUTPATIENT
Start: 2018-09-10 | End: 2018-09-10 | Stop reason: HOSPADM

## 2018-09-10 RX ADMIN — LIDOCAINE HYDROCHLORIDE 20 MG: 20 INJECTION, SOLUTION EPIDURAL; INFILTRATION; INTRACAUDAL; PERINEURAL at 15:10

## 2018-09-10 RX ADMIN — IODIXANOL 120 ML: 320 INJECTION, SOLUTION INTRAVASCULAR at 14:00

## 2018-09-10 RX ADMIN — LIDOCAINE HYDROCHLORIDE 20 MG: 20 INJECTION, SOLUTION EPIDURAL; INFILTRATION; INTRACAUDAL; PERINEURAL at 14:52

## 2018-09-10 RX ADMIN — ONDANSETRON 4 MG: 2 INJECTION INTRAMUSCULAR; INTRAVENOUS at 14:20

## 2018-09-10 RX ADMIN — LABETALOL HYDROCHLORIDE 10 MG: 5 INJECTION, SOLUTION INTRAVENOUS at 17:04

## 2018-09-10 RX ADMIN — HYDROMORPHONE HYDROCHLORIDE 1 MG: 2 INJECTION INTRAMUSCULAR; INTRAVENOUS; SUBCUTANEOUS at 16:34

## 2018-09-10 RX ADMIN — HYDROMORPHONE HYDROCHLORIDE 1 MG: 2 INJECTION INTRAMUSCULAR; INTRAVENOUS; SUBCUTANEOUS at 16:20

## 2018-09-10 RX ADMIN — Medication 10 ML: at 14:21

## 2018-09-10 RX ADMIN — KETOROLAC TROMETHAMINE 30 MG: 30 INJECTION, SOLUTION INTRAMUSCULAR; INTRAVENOUS at 15:26

## 2018-09-10 RX ADMIN — LIDOCAINE HYDROCHLORIDE 20 MG: 20 INJECTION, SOLUTION EPIDURAL; INFILTRATION; INTRACAUDAL; PERINEURAL at 15:15

## 2018-09-10 RX ADMIN — PROPOFOL 50 MCG/KG/MIN: 10 INJECTION, EMULSION INTRAVENOUS at 14:52

## 2018-09-10 RX ADMIN — SODIUM CHLORIDE 20 ML/HR: 900 INJECTION, SOLUTION INTRAVENOUS at 13:00

## 2018-09-10 RX ADMIN — LIDOCAINE HYDROCHLORIDE 20 MG: 20 INJECTION, SOLUTION EPIDURAL; INFILTRATION; INTRACAUDAL; PERINEURAL at 15:21

## 2018-09-10 RX ADMIN — HYDROMORPHONE HYDROCHLORIDE 1 MG: 2 INJECTION INTRAMUSCULAR; INTRAVENOUS; SUBCUTANEOUS at 16:50

## 2018-09-10 RX ADMIN — Medication: at 16:10

## 2018-09-10 RX ADMIN — CIPROFLOXACIN 400 MG: 2 INJECTION, SOLUTION INTRAVENOUS at 14:52

## 2018-09-10 RX ADMIN — Medication 10 ML: at 23:03

## 2018-09-10 RX ADMIN — FAMOTIDINE 20 MG: 10 INJECTION INTRAVENOUS at 14:21

## 2018-09-10 RX ADMIN — ONDANSETRON 4 MG: 2 INJECTION INTRAMUSCULAR; INTRAVENOUS at 15:02

## 2018-09-10 RX ADMIN — SODIUM CHLORIDE 125 ML/HR: 900 INJECTION, SOLUTION INTRAVENOUS at 14:00

## 2018-09-10 RX ADMIN — MIDAZOLAM HYDROCHLORIDE 2 MG: 1 INJECTION, SOLUTION INTRAMUSCULAR; INTRAVENOUS at 14:48

## 2018-09-10 RX ADMIN — LIDOCAINE HYDROCHLORIDE 30 ML: 10 INJECTION, SOLUTION EPIDURAL; INFILTRATION; INTRACAUDAL; PERINEURAL at 14:00

## 2018-09-10 RX ADMIN — LIDOCAINE HYDROCHLORIDE 20 MG: 20 INJECTION, SOLUTION EPIDURAL; INFILTRATION; INTRACAUDAL; PERINEURAL at 15:04

## 2018-09-10 RX ADMIN — Medication 10 ML: at 23:04

## 2018-09-10 NOTE — H&P
OUTPATIENT HISTORY AND PHYSICAL      Today 9/10/2018     Indication/Symptoms:   Isidra Hunter is a 37 y.o. female here for bilateral uterine artery fibroids embolization. Current Meds:    Prior to Admission medications    Medication Sig Start Date End Date Taking? Authorizing Provider   dextroamphetamine-amphetamine (ADDERALL) 20 mg tablet 1 tablet in the morning and afternoon (2 times a day) 6/19/18  Yes Historical Provider   hydrOXYzine HCl (ATARAX) 25 mg tablet 1 tablet as needed 6/19/18  Yes Historical Provider   butalbital-acetaminophen-caff (FIORICET) -40 mg per capsule Take 1 tablet every 8 hours as needed for headache 8/28/18  Yes Quincy Fabian MD   carvedilol (COREG) 6.25 mg tablet Take 4 Tabs by mouth two (2) times daily (with meals). 6/1/18  Yes Roxie Coates MD   amLODIPine (NORVASC) 10 mg tablet Take 1 Tab by mouth daily. 6/1/18  Yes Roxie Coates MD   chlorthalidone (HYGROTEN) 25 mg tablet daily. 5/6/18  Yes Historical Provider   spironolactone (ALDACTONE) 25 mg tablet  5/6/18  Yes Historical Provider   raNITIdine (ZANTAC) 150 mg tablet  5/6/18  Yes Historical Provider   ondansetron (ZOFRAN ODT) 4 mg disintegrating tablet  3/19/18  Yes Historical Provider   montelukast (SINGULAIR) 10 mg tablet  5/6/18  Yes Historical Provider   ferrous sulfate 325 mg (65 mg iron) tablet TAKE ONE TABLET BY MOUTH ONCE DAILY BEFORE BREAKFAST 3/20/18  Yes Quincy Fabian MD   diclofenac EC (VOLTAREN) 75 mg EC tablet TAKE ONE TABLET BY MOUTH TWICE DAILY AS NEEDED 12/26/17  Yes Saman Xavier NP   gabapentin (NEURONTIN) 400 mg capsule 1 three times a day 11/21/17  Yes Quincy Fabian MD   norethindrone-ethinyl estradiol-iron (MICROGESTIN FE1.5/30) 1.5 mg-30 mcg (21)/75 mg (7) tab Take 1 Tab by mouth daily. Indications: Abnormal Uterine Bleeding 10/19/17  Yes Gabriella Joaquin MD   metFORMIN (GLUCOPHAGE) 1,000 mg tablet Take 1 Tab by mouth two (2) times daily (with meals).  8/3/17 Yes Leyla Vidal MD   atorvastatin (LIPITOR) 80 mg tablet Take 1 Tab by mouth daily. 8/3/17  Yes Leyla Vidal MD   Blood-Glucose Meter (FREESTYLE LITE METER) monitoring kit Check fasting glucose once daily 7/18/17  Yes Leyla Vidal MD   glucose blood VI test strips (FREESTYLE LITE STRIPS) strip Check fasting glucose once daily 7/18/17  Yes Leyla Vidal MD   Lancets misc Check fasting glucose once daily 7/18/17  Yes Leyla Vidal MD   cholecalciferol, vitamin D3, (VITAMIN D3) 2,000 unit tab Take  by mouth. Yes Historical Provider   sertraline (ZOLOFT) 50 mg tablet Take 75 mg by mouth nightly. Yes Historical Provider   albuterol (PROVENTIL, VENTOLIN) 90 mcg/actuation inhaler Take 2 Puffs by inhalation every four (4) hours as needed for Wheezing. 2/12/13  Yes SAM Vides       Allergies: Allergies   Allergen Reactions    Ace Inhibitors Swelling       Comorbid Conditions:    Past Medical History:   Diagnosis Date    Abnormal uterine bleeding (AUB)     Anxiety and depression     Asthma     Cardiac echocardiogram 01/27/2017    EF 55-60%. No WMA. Mod conc LVH. Gr 2 DDfx. Mild AI.  Cardiac nuclear imaging test 01/27/2017    Mod risk. Mod area of anterior apical ischemia likely. No infarction. EF 48%. Mild anterior apical hypk. Equivocal EKG on pharm stress test.   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 repeat in 10 years.    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 pylori test 6/9/14    placed on antibiotics and H2 blocker 6/11/14    Positive PPD, treated 2000    PPD positive, treated     Renal duplex 11/17/2016    No significant RA stenosis.  S/P cardiac catheterization 07/2017    No significant obstructive coronary disease, EF 55%, LVEDP 15 mmHg    Vitamin D deficiency 6/9/14          Past Surgical History:   Procedure Laterality Date    CARDIAC CATHETERIZATION  7/20/2017         CORONARY ARTERY ANGIOGRAM  7/20/2017         ENDOMETRIAL CRYOABLATION      HX COLONOSCOPY  04/25/2016    DR. ESTRADA REPEAT IN 2026 (10 YEARS)    HX HERNIA REPAIR  6618    umbilical    LV ANGIOGRAPHY  7/20/2017         MODERATE SEDATION  7/20/2017          Data:    Visit Vitals    /74 (BP Patient Position: At rest)    Pulse 71    Temp 98.4 °F (36.9 °C)    Ht 5' 3\" (1.6 m)    Wt 104.8 kg (231 lb)    SpO2 98%    BMI 40.92 kg/m2   :  Recent Labs      09/10/18   1248   PLT  273     Recent Labs      09/10/18   1248   INR  0.9   APTT  26.6       The H & P and/or progress notes and any available imaging were reviewed. The risks, indications and possible alternatives to the procedure, including doing nothing, were discussed and informed consent was obtained. Physical Exam:      Mental status:   Alert and oriented. Examination specific to the procedure proposed to be performed and any co morbid conditions:   Mallampati classification 2 ,  ASA2   Heart:   RRR. Lungs:   CTAB. No wheezes, rales or rhonchi. The patient is an appropriate candidate to undergo the planned procedure.     Fritz Souza MD

## 2018-09-10 NOTE — PROCEDURES
RADIOLOGY POST PROCEDURE NOTE     September 10, 2018       4:19 PM     Preoperative Diagnosis:   Uterine fibroids symptomatic. Postoperative Diagnosis:  Same. :  Dr. Jimmy Castro    Assistant:  None. Type of Anesthesia: 1% plain lidocaine and IV deep sedation with anaesthesia. Procedure/Description:  Image guided bilateral uterine artery fibroid embolization. Findings:   No bleeding. Estimated blood Loss:  Minimal    Specimen Removed:   no    Blood transfusions:  None. Implants:  See report.     Complications: None    Condition: Stable    Discharge Plan:  continue present therapy    Mike Mosher MD

## 2018-09-10 NOTE — ANESTHESIA POSTPROCEDURE EVALUATION
Post-Anesthesia Evaluation and Assessment    Patient: Nisa Heredia MRN: [id number]  SSN: [id number]    YOB: 1975  Age: 37 y.o. Sex: female      Data from PACU flowsheet    Cardiovascular Function/Vital Signs  Visit Vitals    BP (!) 155/102 (BP 1 Location: Left arm, BP Patient Position: At rest)    Pulse 76    Temp 36.5 °C (97.7 °F)    Resp 16    Ht 5' 3\" (1.6 m)    Wt 104.8 kg (231 lb)    SpO2 100%    BMI 40.92 kg/m2       Patient is status post MAC anesthesia for * No procedures listed *. Nausea/Vomiting: controlled    Postoperative hydration reviewed and adequate. Pain:  Pain Scale 1: Numeric (0 - 10) (09/10/18 1615)  Pain Intensity 1: 10 (09/10/18 1615)   Managed      Mental Status and Level of Consciousness: Alert and oriented     Pulmonary Status:   O2 Device: Oxygen mask (09/10/18 1615)   Adequate oxygenation and airway patent    Complications related to anesthesia: None    Post-anesthesia assessment completed.  No concerns    Signed By: Shahbaz Guerra MD     September 10, 2018

## 2018-09-10 NOTE — PERIOP NOTES
TRANSFER - OUT REPORT:    Verbal report given to Rosy FERRARI on Alea CABAN Hayes-Huitron  being transferred to [facility] for routine post - op       Report consisted of patients Situation, Background, Assessment and   Recommendations(SBAR). Information from the following report(s) SBAR, Procedure Summary, Intake/Output, MAR and Recent Results was reviewed with the receiving nurse. Lines:   Peripheral IV 09/10/18 Right Antecubital (Active)   Site Assessment Clean, dry, & intact 9/10/2018  1:15 PM   Phlebitis Assessment 0 9/10/2018  1:15 PM   Dressing Status Clean, dry, & intact; Clean 9/10/2018  1:15 PM   Dressing Type Transparent 9/10/2018  1:15 PM   Hub Color/Line Status Pink 9/10/2018  1:15 PM   Alcohol Cap Used No 9/10/2018  1:15 PM        Opportunity for questions and clarification was provided.       Patient transported with:   O2 @ 3 liters  Registered Nurse  [facility]

## 2018-09-10 NOTE — IP AVS SNAPSHOT
[address] [id number] [address] Patient: Roxane Zacarias MRN: [id number] :[id number] About your hospitalization You were admitted on:  September 10, 2018 You last received care in the:  [facility] [address] [facility] You were discharged on:  2018 Why you were hospitalized Your primary diagnosis was:  Not on File Your diagnoses also included:  Fibroids Follow-up Information Follow up With Details Comments Contact Info Janice Kinney [address] [facility] 
[phone] Discharge Orders None A check rakesh indicates which time of day the medication should be taken. My Medications START taking these medications Instructions Each Dose to Equal  
 Morning Noon Evening Bedtime  
 ciprofloxacin HCl 500 mg tablet Commonly known as:  CIPRO Your last dose was: Your next dose is: Take 1 Tab by mouth every twenty-four (24) hours for 7 days. 500 mg  
    
   
   
   
  
 docusate sodium 100 mg capsule Commonly known as:  Hien Donate Your last dose was: Your next dose is: Take 1 Cap by mouth two (2) times a day for 7 days. Indications: hold it for diarrhea  
 100 mg  
    
   
   
   
  
 oxyCODONE-acetaminophen 5-325 mg per tablet Commonly known as:  PERCOCET Your last dose was: Your next dose is: Take 1 Tab by mouth every six (6) hours as needed. Max Daily Amount: 4 Tabs. 1 Tab CONTINUE taking these medications Instructions Each Dose to Equal  
 Morning Noon Evening Bedtime ADDERALL 20 mg tablet Generic drug:  dextroamphetamine-amphetamine Your last dose was: Your next dose is:    
   
   
 1 tablet in the morning and afternoon (2 times a day)  
     
   
   
   
  
 albuterol 90 mcg/actuation inhaler Commonly known as:  Carlos Martinez Your last dose was: Your next dose is: Take 2 Puffs by inhalation every four (4) hours as needed for Wheezing. 2 Puff  
    
   
   
   
  
 amLODIPine 10 mg tablet Commonly known as:  Chantellechina French Your last dose was: Your next dose is: Take 1 Tab by mouth daily. 10 mg  
    
   
   
   
  
 atorvastatin 80 mg tablet Commonly known as:  LIPITOR Your last dose was: Your next dose is: Take 1 Tab by mouth daily. 80 mg Blood-Glucose Meter monitoring kit Commonly known as:  FREESTYLE LITE METER Your last dose was: Your next dose is:    
   
   
 Check fasting glucose once daily  
     
   
   
   
  
 butalbital-acetaminophen-caff -40 mg per capsule Commonly known as:  [employer] Your last dose was: Your next dose is: Take 1 tablet every 8 hours as needed for headache  
     
   
   
   
  
 carvedilol 6.25 mg tablet Commonly known as:  Afsaneh Ochoa Your last dose was: Your next dose is: Take 4 Tabs by mouth two (2) times daily (with meals). 25 mg  
    
   
   
   
  
 chlorthalidone 25 mg tablet Commonly known as:  Marianna Mend Your last dose was: Your next dose is:    
   
   
 daily. diclofenac EC 75 mg EC tablet Commonly known as:  VOLTAREN Your last dose was: Your next dose is: TAKE ONE TABLET BY MOUTH TWICE DAILY AS NEEDED  
     
   
   
   
  
 ferrous sulfate 325 mg (65 mg iron) tablet Your last dose was: Your next dose is: TAKE ONE TABLET BY MOUTH ONCE DAILY BEFORE BREAKFAST  
     
   
   
   
  
 gabapentin 400 mg capsule Commonly known as:  NEURONTIN Your last dose was: Your next dose is:    
   
   
 1 three times a day glucose blood VI test strips strip Commonly known as:  FREESTYLE LITE STRIPS Your last dose was: Your next dose is:    
   
   
 Check fasting glucose once daily Lancets Misc Your last dose was: Your next dose is:    
   
   
 Check fasting glucose once daily  
     
   
   
   
  
 metFORMIN 1,000 mg tablet Commonly known as:  GLUCOPHAGE Your last dose was: Your next dose is: Take 1 Tab by mouth two (2) times daily (with meals). 1000 mg  
    
   
   
   
  
 montelukast 10 mg tablet Commonly known as:  SINGULAIR Your last dose was: Your next dose is:    
   
   
      
   
   
   
  
 norethindrone-ethinyl estradiol-iron 1.5 mg-30 mcg (21)/75 mg (7) Tab Commonly known as:  Deana Gals FE1.5/30 Your last dose was: Your next dose is: Take 1 Tab by mouth daily. Indications: Abnormal Uterine Bleeding 1 Tab  
    
   
   
   
  
 ondansetron 4 mg disintegrating tablet Commonly known as:  ZOFRAN ODT Your last dose was: Your next dose is:    
   
   
      
   
   
   
  
 raNITIdine 150 mg tablet Commonly known as:  ZANTAC Your last dose was: Your next dose is:    
   
   
      
   
   
   
  
 sertraline 50 mg tablet Commonly known as:  ZOLOFT Your last dose was: Your next dose is: Take 75 mg by mouth nightly. 75 mg  
    
   
   
   
  
 spironolactone 25 mg tablet Commonly known as:  ALDACTONE Your last dose was: Your next dose is: VITAMIN D3 2,000 unit Tab Generic drug:  cholecalciferol (vitamin D3) Your last dose was: Your next dose is: Take  by mouth. STOP taking these medications   
 hydrOXYzine HCl 25 mg tablet Commonly known as:  ATARAX Where to Get Your Medications Information on where to get these meds will be given to you by the nurse or doctor. ! Ask your nurse or doctor about these medications  
  ciprofloxacin HCl 500 mg tablet  
 docusate sodium 100 mg capsule  
 oxyCODONE-acetaminophen 5-325 mg per tablet Opioid Education Prescription Opioids: What You Need to Know: 
 
Prescription opioids can be used to help relieve moderate-to-severe pain and are often prescribed following a surgery or injury, or for certain health conditions. These medications can be an important part of treatment but also come with serious risks. Opioids are strong pain medicines. Examples include hydrocodone, oxycodone, fentanyl, and morphine. Heroin is an example of an illegal opioid. It is important to work with your health care provider to make sure you are getting the safest, most effective care. WHAT ARE THE RISKS AND SIDE EFFECTS OF OPIOID USE? Prescription opioids carry serious risks of addiction and overdose, especially with prolonged use. An opioid overdose, often marked by slow breathing, can cause sudden death. The use of prescription opioids can have a number of side effects as well, even when taken as directed. · Tolerance-meaning you might need to take more of a medication for the same pain relief · Physical dependence-meaning you have symptoms of withdrawal when the medication is stopped. Withdrawal symptoms can include nausea, sweating, chills, diarrhea, stomach cramps, and muscle aches. Withdrawal can last up to several weeks, depending on which drug you took and how long you took it. · Increased sensitivity to pain · Constipation · Nausea, vomiting, and dry mouth · Sleepiness and dizziness · Confusion · Depression · Low levels of testosterone that can result in lower sex drive, energy, and strength · Itching and sweating RISKS ARE GREATER WITH:      
· History of drug misuse, substance use disorder, or overdose · Mental health conditions (such as depression or anxiety) · Sleep apnea · Older age (72 years or older) · Pregnancy Avoid alcohol while taking prescription opioids. Also, unless specifically advised by your health care provider, medications to avoid include: · Benzodiazepines (such as Xanax or Valium) · Muscle relaxants (such as Soma or Flexeril) · Hypnotics (such as Ambien or Lunesta) · Other prescription opioids KNOW YOUR OPTIONS Talk to your health care provider about ways to manage your pain that don't involve prescription opioids. Some of these options may actually work better and have fewer risks and side effects. Options may include: 
· Pain relievers such as acetaminophen, ibuprofen, and naproxen · Some medications that are also used for depression or seizures · Physical therapy and exercise · Counseling to help patients learn how to cope better with triggers of pain and stress. · Application of heat or cold compress · Massage therapy · Relaxation techniques Be Informed Make sure you know the name of your medication, how much and how often to take it, and its potential risks & side effects. IF YOU ARE PRESCRIBED OPIOIDS FOR PAIN: 
· Never take opioids in greater amounts or more often than prescribed. Remember the goal is not to be pain-free but to manage your pain at a tolerable level. · Follow up with your primary care provider to: · Work together to create a plan on how to manage your pain. · Talk about ways to help manage your pain that don't involve prescription opioids. · Talk about any and all concerns and side effects. · Help prevent misuse and abuse. · Never sell or share prescription opioids · Help prevent misuse and abuse. · Store prescription opioids in a secure place and out of reach of others (this may include visitors, children, friends, and family).  
· Safely dispose of unused/unwanted prescription opioids: Find your community drug take-back program or your pharmacy mail-back program, or flush them down the toilet, following guidance from the Food and Drug Administration (www.fda.gov/Drugs/ResourcesForYou). · Visit www.cdc.gov/drugoverdose to learn about the risks of opioid abuse and overdose. · If you believe you may be struggling with addiction, tell your health care provider and ask for guidance or call [employer] at 3-110-086-GLTF. Discharge Instructions Patient armband removed and given to patient to take home. Patient was informed of the privacy risks if armband lost or stolen [employer] Activation Thank you for requesting access to [employer]og[employer]. Please follow the instructions below to securely access and download your online medical record. Togethera allows you to send messages to your doctor, view your test results, renew your prescriptions, schedule appointments, and more. How Do I Sign Up? 1. In your internet browser, go to [email or web address] 
2. Click on the First Time User? Click Here link in the Sign In box. You will be redirect to the New Member Sign Up page. 3. Enter your Togethera Access Code exactly as it appears below. You will not need to use this code after youve completed the sign-up process. If you do not sign up before the expiration date, you must request a new code. MyChart Access Code: Activation code not generated Current Togethera Status: Active (This is the date your Evvert access code will ) 4. Enter the last four digits of your Social Security Number (xxxx) and Date of Birth (mm/dd/yyyy) as indicated and click Submit. You will be taken to the next sign-up page. 5. Create a Togethera ID. This will be your Togethera login ID and cannot be changed, so think of one that is secure and easy to remember. 6. Create a Togethera password. You can change your password at any time. 7. Enter your Password Reset Question and Answer. This can be used at a later time if you forget your password. 8. Enter your e-mail address. You will receive e-mail notification when new information is available in [address] 9. Click Sign Up. You can now view and download portions of your medical record. 10. Click the Download Summary menu link to download a portable copy of your medical information. Additional Information If you have questions, please visit the Frequently Asked Questions section of the [employer] website at [email or web address] Remember, [employer] is NOT to be used for urgent needs. For medical emergencies, dial 911. DISCHARGE SUMMARY from Nurse PATIENT INSTRUCTIONS: 
 
 
F-face looks uneven A-arms unable to move or move unevenly S-speech slurred or non-existent T-time-call 911 as soon as signs and symptoms begin-DO NOT go Back to bed or wait to see if you get better-TIME IS BRAIN. Warning Signs of HEART ATTACK Call 911 if you have these symptoms: 
? Chest discomfort. Most heart attacks involve discomfort in the center of the chest that lasts more than a few minutes, or that goes away and comes back. It can feel like uncomfortable pressure, squeezing, fullness, or pain. ? Discomfort in other areas of the upper body. Symptoms can include pain or discomfort in one or both arms, the back, neck, jaw, or stomach. ? Shortness of breath with or without chest discomfort. ? Other signs may include breaking out in a cold sweat, nausea, or lightheadedness. Don't wait more than five minutes to call [address]! Fast action can save your life.  Calling 911 is almost always the fastest way to get lifesaving treatment. Emergency Medical Services staff can begin treatment when they arrive  up to an hour sooner than if someone gets to the hospital by car. The discharge information has been reviewed with the patient. The patient verbalized understanding. Discharge medications reviewed with the patient and appropriate educational materials and side effects teaching were provided. ___________________________________________________________________________________________________________________________________ Uterine Fibroid Embolization: What to Expect at Home Your Recovery You can expect to feel better each day, although you may get tired quickly and need pain medicine for several days. You may need about 7 to 10 days to fully recover. Many women have mild to severe cramps for several days after uterine fibroid embolization. You may also have mild nausea or a low fever for 4 or 5 days. Some women have vaginal bleeding or grayish or brownish vaginal discharge for several weeks. These are all normal side effects of the treatment. Your next few menstrual cycles may be heavier than normal. Some women pass tissue for up to 3 months after the procedure. It is important to avoid heavy lifting for about a week. This care sheet gives you a general idea about how long it will take for you to recover. But each person recovers at a different pace. Follow the steps below to get better as quickly as possible. How can you care for yourself at home? Activity 
  · Rest when you feel tired. Getting enough sleep will help you recover.  
  · Try to walk each day. Start out by walking a little more than you did the day before. Bit by bit, increase the amount you walk. Walking boosts blood flow and helps prevent pneumonia and constipation.  
  · For 1 week, avoid lifting anything that would make you strain.  This may include a child, heavy grocery bags and milk containers, a heavy briefcase or backpack, cat litter or dog food bags, or a vacuum .  
  · Avoid strenuous activities, such as biking, jogging, weightlifting, and aerobic exercise, for 4 weeks.  
  · You may shower. Do not take a bath for a few days or until your doctor tells you it is okay.  
  · You may have some vaginal bleeding. Wear sanitary pads if needed. Do not douche or use tampons.  
  · Ask your doctor when you can drive again.  
  · You will probably need to take 1 week off work. It depends on the type of work you do and how you feel.  
  · Your doctor will tell you when you can have sex again. Diet 
  · You can eat your normal diet. If your stomach is upset, try bland, low-fat foods like plain rice, broiled chicken, toast, and yogurt.  
  · Drink plenty of fluids (unless your doctor tells you not to).  
  · You may notice that your bowel movements are not regular right after your surgery. This is common. Try to avoid constipation and straining with bowel movements. You may want to take a fiber supplement every day. If you have not had a bowel movement after a couple of days, ask your doctor about taking a mild laxative. Medicines 
  · Your doctor will tell you if and when you can restart your medicines. He or she will also give you instructions about taking any new medicines.  
  · If you take blood thinners, such as warfarin (Coumadin), clopidogrel (Plavix), or aspirin, be sure to talk to your doctor. He or she will tell you if and when to start taking those medicines again. Make sure that you understand exactly what your doctor wants you to do.  
  · Be safe with medicines. Take pain medicines exactly as directed. ¨ If the doctor gave you a prescription medicine for pain, take it as prescribed.  
¨ If you are not taking a prescription pain medicine, ask your doctor if you can take an over-the-counter medicine.  
  · If you think your pain medicine is making you sick to your stomach: 
 ¨ Take your medicine after meals (unless your doctor tells you not to). ¨ Ask your doctor for a different pain medicine.  
  · If your doctor prescribed antibiotics, take them as directed. Do not stop taking them just because you feel better. You need to take the full course of antibiotics. Other instructions 
  · Wear loose, comfortable clothing and avoid anything that puts pressure on your belly for a few days.  
  · You may want to use a heating pad on your belly to help with pain. Follow-up care is a key part of your treatment and safety. Be sure to make and go to all appointments, and call your doctor if you are having problems. It's also a good idea to know your test results and keep a list of the medicines you take. When should you call for help? Call 911 anytime you think you may need emergency care. For example, call if: 
  · You passed out (lost consciousness).  
  · You have chest pain, are short of breath, or cough up blood.  
 Call your doctor now or seek immediate medical care if: 
  · You have bright red vaginal bleeding that soaks one or more pads in an hour, or you have large clots.  
  · You are sick to your stomach or cannot drink fluids.  
  · You have vaginal discharge that has increased in amount or smells bad.  
  · You have signs of infection, such as: 
¨ Increased pain, swelling, warmth, or redness. ¨ A fever.  
  · You have signs of a blood clot in your leg (called a deep vein thrombosis), such as: 
¨ Pain in your calf, back of the knee, thigh, or groin. ¨ Redness and swelling in your leg or groin.  
  · You have pain that does not get better after you take pain medicine.  
  · You are bleeding from the area where the catheter was put in your artery.  
  · You have a fast-growing, painful lump at the catheter site.  
 Watch closely for any changes in your health, and be sure to contact your doctor if you have any problems. Where can you learn more? Go to http://juan ramon-chuy.info/. Enter .10.06.41.20 in the search box to learn more about \"Uterine Fibroid Embolization: What to Expect at Home. \" Current as of: 2017 Content Version: 11.7 © 3299-1074 [employer]. Care instructions adapted under license by [employer] (which disclaims liability or warranty for this information). If you have questions about a medical condition or this instruction, always ask your healthcare professional. [place] [address] any warranty or liability for your use of this information. Patient armband removed and given to patient to take home. Patient was informed of the privacy risks if armband lost or stolen [employer] Activation Thank you for requesting access to [employer]. Please follow the instructions below to securely access and download your online medical record. [employer] allows you to send messages to your doctor, view your test results, renew your prescriptions, schedule appointments, and more. How Do I Sign Up? 
 
11. In your internet browser, go to [email or web address] 
12. Click on the First Time User? Click Here link in the Sign In box. You will be redirect to the New Member Sign Up page. 15. Enter your [employer]Blends Access Code exactly as it appears below. You will not need to use this code after youve completed the sign-up process. If you do not sign up before the expiration date, you must request a new code. [employer]ends Access Code: Activation code not generated Current [employer]ends Status: Active (This is the date your [employer] access code will ) 14. Enter the last four digits of your Social Security Number (xxxx) and Date of Birth (mm/dd/yyyy) as indicated and click Submit. You will be taken to the next sign-up page. 15. Create a [employer]Blends ID. This will be your [employer]ends login ID and cannot be changed, so think of one that is secure and easy to remember. 12. Create a [employer] password. You can change your password at any time. 16. Enter your Password Reset Question and Answer. This can be used at a later time if you forget your password. 25. Enter your e-mail address. You will receive e-mail notification when new information is available in [address] 19. Click Sign Up. You can now view and download portions of your medical record. 20. Click the Download Summary menu link to download a portable copy of your medical information. Additional Information If you have questions, please visit the Frequently Asked Questions section of the [employer] website at [email or web address] Remember, [employer] is NOT to be used for urgent needs. For medical emergencies, dial 911. DISCHARGE SUMMARY from Nurse PATIENT INSTRUCTIONS: 
 
After general anesthesia or intravenous sedation, for 24 hours or while taking prescription Narcotics: · Limit your activities · Do not drive and operate hazardous machinery · Do not make important personal or business decisions · Do  not drink alcoholic beverages · If you have not urinated within 8 hours after discharge, please contact your surgeon on call. Report the following to your surgeon: 
· Excessive pain, swelling, redness or odor of or around the surgical area · Temperature over 100.5 · Nausea and vomiting lasting longer than 4 hours or if unable to take medications · Any signs of decreased circulation or nerve impairment to extremity: change in color, persistent  numbness, tingling, coldness or increase pain · Any questions What to do at Home: 
Recommended activity: Activity as tolerated If you experience any of the following symptoms Nausea,vomiting, diarrhea, chest pain, shortness of breath 
, please follow up with PCP. *  Please give a list of your current medications to your Primary Care Provider.  
 
*  Please update this list whenever your medications are discontinued, doses are 
 changed, or new medications (including over-the-counter products) are added. *  Please carry medication information at all times in case of emergency situations. These are general instructions for a healthy lifestyle: No smoking/ No tobacco products/ Avoid exposure to second hand smoke Surgeon General's Warning:  Quitting smoking now greatly reduces serious risk to your health. Obesity, smoking, and sedentary lifestyle greatly increases your risk for illness A healthy diet, regular physical exercise & weight monitoring are important for maintaining a healthy lifestyle You may be retaining fluid if you have a history of heart failure or if you experience any of the following symptoms:  Weight gain of 3 pounds or more overnight or 5 pounds in a week, increased swelling in our hands or feet or shortness of breath while lying flat in bed. Please call your doctor as soon as you notice any of these symptoms; do not wait until your next office visit. Recognize signs and symptoms of STROKE: 
 
F-face looks uneven A-arms unable to move or move unevenly S-speech slurred or non-existent T-time-call 911 as soon as signs and symptoms begin-DO NOT go Back to bed or wait to see if you get better-TIME IS BRAIN. Warning Signs of HEART ATTACK Call 911 if you have these symptoms: 
? Chest discomfort. Most heart attacks involve discomfort in the center of the chest that lasts more than a few minutes, or that goes away and comes back. It can feel like uncomfortable pressure, squeezing, fullness, or pain. ? Discomfort in other areas of the upper body. Symptoms can include pain or discomfort in one or both arms, the back, neck, jaw, or stomach. ? Shortness of breath with or without chest discomfort. ? Other signs may include breaking out in a cold sweat, nausea, or lightheadedness. Don't wait more than five minutes to call [address]!  Fast action can save your life. Calling 911 is almost always the fastest way to get lifesaving treatment. Emergency Medical Services staff can begin treatment when they arrive  up to an hour sooner than if someone gets to the hospital by car. The discharge information has been reviewed with the patient. The patient verbalized understanding. Discharge medications reviewed with the patient and appropriate educational materials and side effects teaching were provided. ___________________________________________________________________________________________________________________________________ Introducing [facility]! Dear Tanner De Los Santos: Thank you for requesting a Like.fm account. Our records indicate that you already have an active Like.fm account. You can access your account anytime at [email or web address] Did you know that you can access your hospital and ER discharge instructions at any time in Like.fm? You can also review all of your test results from your hospital stay or ER visit. Additional Information If you have questions, please visit the Frequently Asked Questions section of the Like.fm website at [email or web address] Remember, Like.fm is NOT to be used for urgent needs. For medical emergencies, dial 911. Now available from your iPhone and Android! Introducing J Luis Smith As a Gia Lingblake patient, I wanted to make you aware of our electronic visit tool called J Luis Smith. Gia Spears 24/7 allows you to connect within minutes with a medical provider 24 hours a day, seven days a week via a mobile device or tablet or logging into a secure website from your computer. You can access J Luis Smith from anywhere in the [place].  
 
A virtual visit might be right for you when you have a simple condition and feel like you just dont want to get out of bed, or cant get away from work for an appointment, when your regular Sohan Shove provider is not available (evenings, weekends or holidays), or when youre out of town and need minor care. Electronic visits cost only $49 and if the [employer] 24/7 provider determines a prescription is needed to treat your condition, one can be electronically transmitted to a nearby pharmacy*. Please take a moment to enroll today if you have not already done so. The enrollment process is free and takes just a few minutes. To enroll, please download the S[employer]Pin Software 24/7 liya to your tablet or phone, or visit [email or web address] to enroll on your computer. And, as an [address] [place] [address] patient with a Funky Android account, the results of your visits will be scanned into your electronic medical record and your primary care provider will be able to view the scanned results. We urge you to continue to see your regular Sohan Shove provider for your ongoing medical care. And while your primary care provider may not be the one available when you seek a Bolongaro Trevorivanfin virtual visit, the peace of mind you get from getting a real diagnosis real time can be priceless. For more information on Peela, view our Frequently Asked Questions (FAQs) at [email or web address]. Sincerely, 
 
Chris Bazzi MD 
Chief Medical Officer [address] [place] [address] *:  certain medications cannot be prescribed via Peela Providers Seen During Your Hospitalization Provider Specialty Primary office phone David Reid MD Radiology [phone] Abraham Guallpa MD Internal Medicine [phone] Sharath Dang MD Internal Medicine [phone] Your Primary Care Physician (PCP) Primary Care Physician Office Phone Office Fax Emile Perez [phone] You are allergic to the following Allergen Reactions Ace Inhibitors Swelling Recent Documentation Height Weight BMI OB Status Smoking Status 1.6 m 104.8 kg 40.92 kg/m2 Having regular periods Current Every Day Smoker Emergency Contacts Name Discharge Info Relation Home Work [facility] DISCHARGE CAREGIVER [3] Mother [14] [id number]  [phone] Patient Belongings The following personal items are in your possession at time of discharge: 
  Dental Appliances: None         Home Medications: None   Jewelry: None  Clothing: At bedside, Pants, Shirt    Other Valuables: Cell Phone, With patient Discharge Instructions Attachments/References MEFS - CIPROFLOXACIN (CIPRO) - (BY MOUTH) (ENGLISH) MEFS - LAXATIVE, STOOL SOFTENERS (DOCULAX, COLACE, COLACE CLEAR, DSS) - (BY MOUTH) (ENGLISH) MEFS - OXYCODONE/ACETAMINOPHEN (PERCOCET, ROXICET) - (BY MOUTH) (ENGLISH) Patient Handouts Ciprofloxacin (Cipro) - (By mouth) Why this medicine is used:  
Treats infections. Contact a nurse or doctor right away if you have: · Blistering, peeling, or red skin rash · Fast, slow, or uneven heartbeat; lightheadedness or fainting · Severe or bloody diarrhea · Pain, stiffness, swelling, or bruises around your ankle, leg, shoulder, or other joint Common side effects: 
· Nausea · Headache © 2017 [employer] Information is for End User's use only and may not be sold, redistributed or otherwise used for commercial purposes. Laxative, Stool Softeners (Doculax, Colace, Colace Clear, DSS) - (By mouth) Why this medicine is used:  
Treats constipation by helping you have a bowel movement. Contact a nurse or doctor right away if you have: · Dark urine or pale stools · Vomiting, loss of appetite, stomach pain · Yellow skin or eyes Common side effects: 
· Nausea, diarrhea, stomach cramps, bitter taste in mouth © 2017 [employer] Information is for End User's use only and may not be sold, redistributed or otherwise used for commercial purposes. Oxycodone/Acetaminophen (Percocet, Roxicet) - (By mouth) Why this medicine is used:  
Treats pain. This medicine contains a narcotic pain reliever. Contact a nurse or doctor right away if you have: 
· Extreme weakness, shallow breathing, slow heartbeat · Sweating or cold, clammy skin · Skin blisters, rash, or peeling Common side effects: 
· Constipation · Nausea, vomiting · Tiredness © 2017 [facility] Information is for End User's use only and may not be sold, redistributed or otherwise used for commercial purposes. Please provide this summary of care documentation to your next provider. Signatures-by signing, you are acknowledging that this After Visit Summary has been reviewed with you and you have received a copy. Patient Signature:  ____________________________________________________________ Date:  ____________________________________________________________  
  
Leatha Cummings Provider Signature:  ____________________________________________________________ Date:  ____________________________________________________________

## 2018-09-11 VITALS
HEIGHT: 63 IN | TEMPERATURE: 98.1 F | SYSTOLIC BLOOD PRESSURE: 152 MMHG | BODY MASS INDEX: 40.93 KG/M2 | HEART RATE: 85 BPM | WEIGHT: 231 LBS | OXYGEN SATURATION: 99 % | DIASTOLIC BLOOD PRESSURE: 102 MMHG | RESPIRATION RATE: 20 BRPM

## 2018-09-11 LAB
GLUCOSE BLD STRIP.AUTO-MCNC: 165 MG/DL (ref 70–110)
GLUCOSE BLD STRIP.AUTO-MCNC: 166 MG/DL (ref 70–110)

## 2018-09-11 PROCEDURE — 74011250636 HC RX REV CODE- 250/636: Performed by: INTERNAL MEDICINE

## 2018-09-11 PROCEDURE — 99218 HC RM OBSERVATION: CPT

## 2018-09-11 PROCEDURE — 74011250637 HC RX REV CODE- 250/637: Performed by: RADIOLOGY

## 2018-09-11 PROCEDURE — 74011250637 HC RX REV CODE- 250/637: Performed by: INTERNAL MEDICINE

## 2018-09-11 PROCEDURE — 82962 GLUCOSE BLOOD TEST: CPT

## 2018-09-11 RX ORDER — CIPROFLOXACIN 500 MG/1
500 TABLET ORAL EVERY 24 HOURS
Qty: 7 TAB | Refills: 0 | Status: SHIPPED
Start: 2018-09-11 | End: 2018-09-18

## 2018-09-11 RX ORDER — HYDRALAZINE HYDROCHLORIDE 20 MG/ML
10 INJECTION INTRAMUSCULAR; INTRAVENOUS
Status: DISCONTINUED | OUTPATIENT
Start: 2018-09-11 | End: 2018-09-11 | Stop reason: HOSPADM

## 2018-09-11 RX ORDER — AMLODIPINE BESYLATE 10 MG/1
10 TABLET ORAL DAILY
Status: DISCONTINUED | OUTPATIENT
Start: 2018-09-11 | End: 2018-09-11

## 2018-09-11 RX ORDER — CARVEDILOL 6.25 MG/1
6.25 TABLET ORAL 2 TIMES DAILY WITH MEALS
Status: DISCONTINUED | OUTPATIENT
Start: 2018-09-11 | End: 2018-09-11 | Stop reason: HOSPADM

## 2018-09-11 RX ORDER — DOCUSATE SODIUM 100 MG/1
100 CAPSULE, LIQUID FILLED ORAL 2 TIMES DAILY
Qty: 14 CAP | Refills: 0 | Status: SHIPPED | OUTPATIENT
Start: 2018-09-11 | End: 2018-09-18

## 2018-09-11 RX ORDER — OXYCODONE AND ACETAMINOPHEN 5; 325 MG/1; MG/1
1 TABLET ORAL
Qty: 12 TAB | Refills: 0 | Status: SHIPPED
Start: 2018-09-11

## 2018-09-11 RX ORDER — CIPROFLOXACIN 500 MG/1
500 TABLET ORAL EVERY 24 HOURS
Status: DISCONTINUED | OUTPATIENT
Start: 2018-09-11 | End: 2018-09-11 | Stop reason: HOSPADM

## 2018-09-11 RX ORDER — AMLODIPINE BESYLATE 10 MG/1
10 TABLET ORAL DAILY
Status: DISCONTINUED | OUTPATIENT
Start: 2018-09-11 | End: 2018-09-11 | Stop reason: HOSPADM

## 2018-09-11 RX ORDER — OXYCODONE AND ACETAMINOPHEN 5; 325 MG/1; MG/1
1 TABLET ORAL
Status: DISCONTINUED | OUTPATIENT
Start: 2018-09-11 | End: 2018-09-11 | Stop reason: HOSPADM

## 2018-09-11 RX ADMIN — Medication: at 09:43

## 2018-09-11 RX ADMIN — DOCUSATE SODIUM 100 MG: 100 CAPSULE, LIQUID FILLED ORAL at 08:50

## 2018-09-11 RX ADMIN — AMLODIPINE BESYLATE 10 MG: 10 TABLET ORAL at 05:20

## 2018-09-11 RX ADMIN — Medication 10 ML: at 13:10

## 2018-09-11 RX ADMIN — CARVEDILOL 6.25 MG: 6.25 TABLET, FILM COATED ORAL at 03:28

## 2018-09-11 RX ADMIN — Medication 10 ML: at 05:20

## 2018-09-11 RX ADMIN — CIPROFLOXACIN HYDROCHLORIDE 500 MG: 500 TABLET, FILM COATED ORAL at 14:34

## 2018-09-11 RX ADMIN — OXYCODONE HYDROCHLORIDE AND ACETAMINOPHEN 1 TABLET: 5; 325 TABLET ORAL at 14:34

## 2018-09-11 RX ADMIN — HYDRALAZINE HYDROCHLORIDE 10 MG: 20 INJECTION INTRAMUSCULAR; INTRAVENOUS at 03:30

## 2018-09-11 NOTE — H&P
HISTORY & PHYSICAL            Patient: Tete Darby MRN: [id number]  CSN: [id number]    YOB: 1975  Age: 37 y.o. Sex: female    DOA: 9/10/2018 LOS:  LOS: 0 days        DOA: 9/10/2018        Assessment/Plan     Active Problems:    Fibroids (9/10/2018)        Plan:  1. Uterine fibroids - s/p embolization by IR   2. Pain control - PCA Fentanyl   3. H/o HTN - holding meds for now - can resume in AM on discharge   4. H/o T2DM - resume meds on discharge   DVT px - SCD   FC           Severity of Signs & Symptoms -- Moderate  Risk of adverse events -- Moderate  Current Medical Rx Plan - As Above   Patient history & comorbidities - Per HPI  Discharge Plan -- Home            HPI:     Tete Darby is a 37 y.o. female who is being admitted overnight post procedure for Uterine fibroid embolization -- she mentions she is having some fullness to the lower abdominal area post procedure - pain is well controlled with PCA   Will observe overnight     Past Medical History:   Diagnosis Date    Abnormal uterine bleeding (AUB)     Anxiety and depression     Asthma     Cardiac echocardiogram 01/27/2017    EF 55-60%. No WMA. Mod conc LVH. Gr 2 DDfx. Mild AI.  Cardiac nuclear imaging test 01/27/2017    Mod risk. Mod area of anterior apical ischemia likely. No infarction. EF 48%. Mild anterior apical hypk. Equivocal EKG on pharm stress test.   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 repeat in 10 years.    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 pylori test 6/9/14    placed on antibiotics and H2 blocker 6/11/14    Positive PPD, treated 2000    PPD positive, treated     Renal duplex 11/17/2016    No significant RA stenosis.  S/P cardiac catheterization 07/2017    No significant obstructive coronary disease, EF 55%, LVEDP 15 mmHg    Vitamin D deficiency 6/9/14       Past Surgical History:   Procedure Laterality Date    CARDIAC CATHETERIZATION  7/20/2017         CORONARY ARTERY ANGIOGRAM  7/20/2017         ENDOMETRIAL CRYOABLATION      HX COLONOSCOPY  04/25/2016    DR. ESTRADA REPEAT IN 2026 (10 YEARS)    HX HERNIA REPAIR  8872    umbilical    LV ANGIOGRAPHY  7/20/2017         MODERATE SEDATION  7/20/2017            Family History   Problem Relation Age of Onset    Hypertension Mother     Diabetes Mother     Depression Mother     Substance Abuse Mother      Tobacco use    Migraines Mother     High Cholesterol Mother     Heart Attack Mother     Heart Failure Mother     Eczema Mother     Arthritis-osteo Mother     Hypertension Father     Diabetes Father     Substance Abuse Father      Tobacco use and drug abuse    Stroke Paternal Aunt     Heart Disease Maternal Grandfather     Hypertension Maternal Grandfather     High Cholesterol Maternal Grandfather     Diabetes Maternal Grandfather     Stroke Maternal Grandfather     Heart Attack Maternal Grandfather     Heart Failure Maternal Grandfather     Alcohol abuse Brother      Drug/Alcohol abuse    Substance Abuse Maternal Grandmother      Tobacco use - MGM, MGF, and brother,    Diabetes Maternal Grandmother     Hypertension Brother     High Cholesterol Brother     Diabetes Sister     Diabetes Paternal Grandmother     Heart Attack Brother     Heart Failure Brother        Social History     Social History    Marital status:      Spouse name: N/A    Number of children: N/A    Years of education: N/A     Social History Main Topics    Smoking status: Current Every Day Smoker     Packs/day: 0.50     Years: 0.50    Smokeless tobacco: Never Used    Alcohol use 1.2 oz/week     2 Cans of beer per week      Comment: monthly 1 or 2    Drug use: Yes     Special: Marijuana, Cocaine      Comment: Cocaine stopped 2008, Marijuana 4/2/16 usually once a month or two    Sexual activity: Not Currently     Partners: Female, Male     Birth control/ protection: None     Other Topics Concern     Service Yes     [id number]    Blood Transfusions Yes     2012    Caffeine Concern No    Occupational Exposure No    Hobby Hazards No    Sleep Concern No    Stress Concern Yes    Weight Concern No    Special Diet No    Back Care No    Exercise No    Bike Helmet No    Seat Belt No    Self-Exams No     Social History Narrative       Prior to Admission medications    Medication Sig Start Date End Date Taking? Authorizing Provider   dextroamphetamine-amphetamine (ADDERALL) 20 mg tablet 1 tablet in the morning and afternoon (2 times a day) 6/19/18  Yes Historical Provider   hydrOXYzine HCl (ATARAX) 25 mg tablet 1 tablet as needed 6/19/18  Yes Historical Provider   butalbital-acetaminophen-caff (FIORICET) -40 mg per capsule Take 1 tablet every 8 hours as needed for headache 8/28/18  Yes Quincy Fabian MD   carvedilol (COREG) 6.25 mg tablet Take 4 Tabs by mouth two (2) times daily (with meals). 6/1/18  Yes Roxie Coates MD   amLODIPine (NORVASC) 10 mg tablet Take 1 Tab by mouth daily. 6/1/18  Yes Roxie Coates MD   chlorthalidone (HYGROTEN) 25 mg tablet daily.  5/6/18  Yes Historical Provider   spironolactone (ALDACTONE) 25 mg tablet  5/6/18  Yes Historical Provider   raNITIdine (ZANTAC) 150 mg tablet  5/6/18  Yes Historical Provider   ondansetron (ZOFRAN ODT) 4 mg disintegrating tablet  3/19/18  Yes Historical Provider   montelukast (SINGULAIR) 10 mg tablet 5/6/18  Yes Historical Provider   ferrous sulfate 325 mg (65 mg iron) tablet TAKE ONE TABLET BY MOUTH ONCE DAILY BEFORE BREAKFAST 3/20/18  Yes Nelly Pisano MD   diclofenac EC (VOLTAREN) 75 mg EC tablet TAKE ONE TABLET BY MOUTH TWICE DAILY AS NEEDED 12/26/17  Yes Sapna Rodriguez NP   gabapentin (NEURONTIN) 400 mg capsule 1 three times a day 11/21/17  Yes Nelly Pisano MD   norethindrone-ethinyl estradiol-iron (MICROGESTIN FE1.5/30) 1.5 mg-30 mcg (21)/75 mg (7) tab Take 1 Tab by mouth daily. Indications: Abnormal Uterine Bleeding 10/19/17  Yes Maral Grubbs MD   metFORMIN (GLUCOPHAGE) 1,000 mg tablet Take 1 Tab by mouth two (2) times daily (with meals). 8/3/17  Yes Nelly Pisano MD   atorvastatin (LIPITOR) 80 mg tablet Take 1 Tab by mouth daily. 8/3/17  Yes Nelly Pisano MD   Blood-Glucose Meter (FREESTYLE LITE METER) monitoring kit Check fasting glucose once daily 7/18/17  Yes Nelly Pisano MD   glucose blood VI test strips (FREESTYLE LITE STRIPS) strip Check fasting glucose once daily 7/18/17  Yes Nelly Pisano MD   Lancets misc Check fasting glucose once daily 7/18/17  Yes Nelly Pisano MD   cholecalciferol, vitamin D3, (VITAMIN D3) 2,000 unit tab Take  by mouth. Yes Historical Provider   sertraline (ZOLOFT) 50 mg tablet Take 75 mg by mouth nightly. Yes Historical Provider   albuterol (PROVENTIL, VENTOLIN) 90 mcg/actuation inhaler Take 2 Puffs by inhalation every four (4) hours as needed for Wheezing. 2/12/13  Yes SAM Greer       Allergies   Allergen Reactions    Ace Inhibitors Swelling       Review of Systems  A comprehensive review of systems was negative except for that written in the History of Present Illness.              Physical Exam:      Visit Vitals    BP (!) 164/93 (BP 1 Location: Left arm, BP Patient Position: At rest)    Pulse 97    Temp 97 °F (36.1 °C)    Resp 16    Ht 5' 3\" (1.6 m)    Wt 104.8 kg (231 lb)    SpO2 100%    BMI 40.92 kg/m2 Physical Exam:    Gen: In general, this is a well nourished female in no acute distress  HEENT: Sclerae nonicteric. Oral mucous membranes moist. Dentition normal  Neck: Supple with midline trachea. CV: RRR without murmur or rub appreciated. Resp:Respirations are unlabored without use of accessory muscles. Lung fields bilaterally without wheezes or rhonchi. Abd: Soft, nontender, nondistended. Extrem: Extremities are warm, without cyanosis or clubbing. No pitting pretibial edema. Skin: Warm, no visible rashes. Neuro: Patient is alert, oriented, and cooperative. No obvious focal defects. Moves all 4 extremities.     Labs Reviewed:    Recent Results (from the past 24 hour(s))   HCG QL SERUM    Collection Time: 09/10/18 12:48 PM   Result Value Ref Range    HCG, Ql. NEGATIVE  NEG     METABOLIC PANEL, BASIC    Collection Time: 09/10/18 12:48 PM   Result Value Ref Range    Sodium 138 136 - 145 mmol/L    Potassium 3.6 3.5 - 5.5 mmol/L    Chloride 105 100 - 108 mmol/L    CO2 23 21 - 32 mmol/L    Anion gap 10 3.0 - 18 mmol/L    Glucose 123 (H) 74 - 99 mg/dL    BUN 10 7.0 - 18 MG/DL    Creatinine 0.81 0.6 - 1.3 MG/DL    BUN/Creatinine ratio 12 12 - 20      GFR est AA >60 >60 ml/min/1.73m2    GFR est non-AA >60 >60 ml/min/1.73m2    Calcium 9.5 8.5 - 10.1 MG/DL   CBC W/O DIFF    Collection Time: 09/10/18 12:48 PM   Result Value Ref Range    WBC 5.0 4.6 - 13.2 K/uL    RBC 4.02 (L) 4.20 - 5.30 M/uL    HGB 13.0 12.0 - 16.0 g/dL    HCT 36.9 35.0 - 45.0 %    MCV 91.8 74.0 - 97.0 FL    MCH 32.3 24.0 - 34.0 PG    MCHC 35.2 31.0 - 37.0 g/dL    RDW 13.5 11.6 - 14.5 %    PLATELET 755 579 - 136 K/uL    MPV 9.8 9.2 - 11.8 FL   PROTHROMBIN TIME + INR    Collection Time: 09/10/18 12:48 PM   Result Value Ref Range    Prothrombin time 12.3 11.5 - 15.2 sec    INR 0.9 0.8 - 1.2     PTT    Collection Time: 09/10/18 12:48 PM   Result Value Ref Range    aPTT 26.6 23.0 - 36.4 SEC   DRUG SCREEN, URINE    Collection Time: 09/10/18  2:15 PM   Result Value Ref Range    BENZODIAZEPINES NEGATIVE  NEG      BARBITURATES POSITIVE (A) NEG      THC (TH-CANNABINOL) NEGATIVE  NEG      OPIATES NEGATIVE  NEG      PCP(PHENCYCLIDINE) NEGATIVE  NEG      COCAINE NEGATIVE  NEG      AMPHETAMINES NEGATIVE  NEG      METHADONE NEGATIVE  NEG      HDSCOM (NOTE)    GLUCOSE, POC    Collection Time: 09/10/18  5:14 PM   Result Value Ref Range    Glucose (POC) 124 (H) 70 - 110 mg/dL   GLUCOSE, POC    Collection Time: 09/10/18  6:12 PM   Result Value Ref Range    Glucose (POC) 136 (H) 70 - 110 mg/dL       Imaging Reviewed:      IMPRESSION:     Successful, uncomplicated bilateral uterine artery fibroid embolization.      PLAN: Patient will be admitted for an observation and pain management.         Rafa Oseguera MD  9/10/2018, 9:57 PM

## 2018-09-11 NOTE — PROGRESS NOTES
conducted an initial consultation and Spiritual Assessment for Alea Manzano, who is a 37 y.o.,female. Patients Primary Language is: [place]. According to the patients EMR Cheondoism Affiliation is: Rachle. The reason the Patient came to the hospital is:   Patient Active Problem List    Diagnosis Date Noted    Fibroids 09/10/2018    Controlled type 2 diabetes mellitus with microalbuminuria, without long-term current use of insulin ([facility] 08/28/2018    Dyslipidemia 06/01/2018    Hypertensive heart disease without heart failure 06/01/2018    Morbid obesity ([facility] 06/01/2018    History of positive PPD 11/21/2017    BMI 40.0-44.9, adult ([facility] 11/09/2017    Controlled type 2 diabetes mellitus with diabetic nephropathy, without long-term current use of insulin ([facility] 11/09/2017    Microalbuminuria- h/o angioedema on ace 11/09/2017    Episodic tension-type headache, not intractable 08/03/2017    Pure hypercholesterolemia 11/08/2016    Tobacco use 10/25/2016    Bilateral carpal tunnel syndrome 10/25/2016    Positive H. pylori test 06/10/2014    Iron deficiency anemia 06/10/2014    Vitamin D deficiency 06/10/2014    Anxiety and depression 06/05/2014    Gastroesophageal reflux disease 01/04/2014    HTN (hypertension) 09/26/2012        The  provided the following Interventions:  Initiated a relationship of care and support. Explored issues of khang, spirituality and/or Roman Catholic needs while hospitalized. Listened empathically. Provided chaplaincy education. Offered prayer and assurance of continued prayers on patient's behalf. Chart reviewed. The following outcomes were achieved:  Patient shared some information about their medical narrative and spiritual journey/beliefs. Patient processed feeling about current hospitalization. Patient expressed gratitude for the 's visit.     Assessment:  Patient did not indicate any spiritual or Roman Catholic issues which require Spiritual Care Services interventions at this time. Patient does not have any Yazdanism/cultural needs that will affect patients preferences in health care. Plan:  Chaplains will continue to follow and will provide pastoral care on an as needed or requested basis.  recommends bedside caregivers page  on duty if patient shows signs of acute spiritual or emotional distress.   Jihan Bob, [facility]  Spiritual Care  [phone]

## 2018-09-11 NOTE — DISCHARGE SUMMARY
Physician Discharge Summary       Patient: Marisol Cifuentes MRN: [id number]  SSN: [id number]    YOB: 1975  Age: 37 y.o. Sex: female    PCP: Dae Johnson MD    Allergies: Ace inhibitors    Admit date: 9/10/2018  Admitting Provider: Cedrick Valadez MD    Discharge date: 9/11/2018  Discharging Provider: Jackson Kwok MD    * Admission Diagnoses: Submucous uterine fibroid [D25.0]       * Discharge Diagnoses:    Hospital Problems as of 9/11/2018  Date Reviewed: 8/28/2018          Codes Class Noted - Resolved POA    Fibroids ICD-10-CM: D25.9  ICD-9-CM: 218.9  9/10/2018 - Present Unknown            1. Uterine fibroids s/p embolization by IR   2. Abdominal pain   3. HTN   4. DM - 2    * Hospital Course: The patient was admitted here after having embolization done on Uterine artery. She was started on PCA for pain management. She was continued on home medications for other co-morbidities. On the day of discharge, she was feeling better other than some pain. IR cleared this patient for the discharge. She was voiding urine without issue and had a BM. Patient will be discharged home today.      * Procedures:     Cardiology and IR Orders (Through next 24h)    Start     Ordered    09/10/18 1229  IR OCCL TXCATH ORGAN W SI  [[id number]]  ONE TIME     Comments:  UFE(unterine fibroid embolization)    09/10/18 1229          Consults: IR    Significant Diagnostic Studies:   Recent Results (from the past 24 hour(s))   GLUCOSE, POC    Collection Time: 09/10/18  5:14 PM   Result Value Ref Range    Glucose (POC) 124 (H) 70 - 110 mg/dL   GLUCOSE, POC    Collection Time: 09/10/18  6:12 PM   Result Value Ref Range    Glucose (POC) 136 (H) 70 - 110 mg/dL   GLUCOSE, POC    Collection Time: 09/11/18  7:58 AM   Result Value Ref Range    Glucose (POC) 165 (H) 70 - 110 mg/dL   GLUCOSE, POC    Collection Time: 09/11/18 11:24 AM   Result Value Ref Range    Glucose (POC) 166 (H) 70 - 110 mg/dL         Discharge Exam:  Visit Vitals    BP (!) 152/102 (BP 1 Location: Right arm, BP Patient Position: At rest)    Pulse 85    Temp 98.1 °F (36.7 °C)    Resp 20    Ht 5' 3\" (1.6 m)    Wt 104.8 kg (231 lb)    SpO2 99%    BMI 40.92 kg/m2     General appearance: alert, cooperative, no distress, appears stated age  Throat: Oral mucosa is moist  Lungs: clear to auscultation bilaterally  Heart: S1, S2 normal  Abdomen: soft, non-tender. Bowel sounds normal. No masses,  no organomegaly  Extremities: no edema    * Discharge Condition: improved  * Disposition: Home    Discharge Medications:  Current Discharge Medication List      START taking these medications    Details   ciprofloxacin HCl (CIPRO) 500 mg tablet Take 1 Tab by mouth every twenty-four (24) hours for 7 days. Qty: 7 Tab, Refills: 0      docusate sodium (COLACE) 100 mg capsule Take 1 Cap by mouth two (2) times a day for 7 days. Indications: hold it for diarrhea  Qty: 14 Cap, Refills: 0      oxyCODONE-acetaminophen (PERCOCET) 5-325 mg per tablet Take 1 Tab by mouth every six (6) hours as needed. Max Daily Amount: 4 Tabs. Qty: 12 Tab, Refills: 0    Associated Diagnoses: Submucous uterine fibroid         CONTINUE these medications which have NOT CHANGED    Details   dextroamphetamine-amphetamine (ADDERALL) 20 mg tablet 1 tablet in the morning and afternoon (2 times a day)      butalbital-acetaminophen-caff (FIORICET) -40 mg per capsule Take 1 tablet every 8 hours as needed for headache  Qty: 12 Cap, Refills: 0      carvedilol (COREG) 6.25 mg tablet Take 4 Tabs by mouth two (2) times daily (with meals). Qty: 180 Tab, Refills: 3      amLODIPine (NORVASC) 10 mg tablet Take 1 Tab by mouth daily. Qty: 90 Tab, Refills: 3      chlorthalidone (HYGROTEN) 25 mg tablet daily.   Refills: 3      spironolactone (ALDACTONE) 25 mg tablet Refills: 3      raNITIdine (ZANTAC) 150 mg tablet Refills: 2      ondansetron (ZOFRAN ODT) 4 mg disintegrating tablet Refills: 0      montelukast (SINGULAIR) 10 mg tablet Refills: 2      ferrous sulfate 325 mg (65 mg iron) tablet TAKE ONE TABLET BY MOUTH ONCE DAILY BEFORE BREAKFAST  Qty: 30 Tab, Refills: 5    Comments: Please consider 90 day supplies to promote better adherence      diclofenac EC (VOLTAREN) 75 mg EC tablet TAKE ONE TABLET BY MOUTH TWICE DAILY AS NEEDED  Qty: 60 Tab, Refills: 5    Comments: Please consider 90 day supplies to promote better adherence  Associated Diagnoses: Myofascial pain; Occipital neuralgia, unspecified laterality; Facet syndrome ([address] Neck pain; Pain of lumbar spine; Thoracic spine pain      gabapentin (NEURONTIN) 400 mg capsule 1 three times a day  Qty: 90 Cap, Refills: 3      norethindrone-ethinyl estradiol-iron (MICROGESTIN FE1.5/30) 1.5 mg-30 mcg (21)/75 mg (7) tab Take 1 Tab by mouth daily. Indications: Abnormal Uterine Bleeding  Qty: 1 Package, Refills: 12    Associated Diagnoses: DUB (dysfunctional uterine bleeding)      metFORMIN (GLUCOPHAGE) 1,000 mg tablet Take 1 Tab by mouth two (2) times daily (with meals). Qty: 180 Tab, Refills: 1      atorvastatin (LIPITOR) 80 mg tablet Take 1 Tab by mouth daily. Qty: 90 Tab, Refills: 2      Blood-Glucose Meter (FREESTYLE LITE METER) monitoring kit Check fasting glucose once daily  Qty: 1 Kit, Refills: 0      glucose blood VI test strips (FREESTYLE LITE STRIPS) strip Check fasting glucose once daily  Qty: 100 Strip, Refills: 2      Lancets misc Check fasting glucose once daily  Qty: 1 Each, Refills: 11      cholecalciferol, vitamin D3, (VITAMIN D3) 2,000 unit tab Take  by mouth. Associated Diagnoses: Left foot pain; Left ankle pain, unspecified chronicity      sertraline (ZOLOFT) 50 mg tablet Take 75 mg by mouth nightly. albuterol (PROVENTIL, VENTOLIN) 90 mcg/actuation inhaler Take 2 Puffs by inhalation every four (4) hours as needed for Wheezing.   Qty: 17 g, Refills: 0         STOP taking these medications       hydrOXYzine HCl (ATARAX) 25 mg tablet Comments: Reason for Stopping:               * Follow-up Care/Patient Instructions:   Activity: Activity as tolerated  Diet: Cardiac Diet and Diabetic Diet  Wound Care: None needed    Follow-up Information     Follow up With Details Comments Contact Dolly Maharaj MD On 9/19/2018 @ 9.30 8 [address]  [phone]      Elsa Pardo MD On 10/2/2018 @ 1.00 [address] [address]  [phone]            Signed:  Ru Reich MD  9/11/2018  2:47 PM

## 2018-09-11 NOTE — PROGRESS NOTES
Problem: Pressure Injury - Risk of  Goal: *Prevention of pressure injury  Document Familia Scale and appropriate interventions in the flowsheet. Outcome: Progressing Towards Goal  Pressure Injury Interventions:       Moisture Interventions: Absorbent underpads    Activity Interventions: PT/OT evaluation    Mobility Interventions: HOB 30 degrees or less    Nutrition Interventions: Document food/fluid/supplement intake    Friction and Shear Interventions: HOB 30 degrees or less               Problem: Falls - Risk of  Goal: *Absence of Falls  Document Jose Fall Risk and appropriate interventions in the flowsheet.    Outcome: Progressing Towards Goal  Fall Risk Interventions:       Mentation Interventions: Evaluate medications/consider consulting pharmacy    Medication Interventions: Evaluate medications/consider consulting pharmacy    Elimination Interventions: Call light in reach

## 2018-09-11 NOTE — PROGRESS NOTES
Interventional Radiology      Patient seen in follow up for s/p BUAFE    POD1    AVSS    Patient can go home now  Rx will be given to     I will see patient in 4 weeks with MRI pelvis done prior to visit.     Thank you

## 2018-09-11 NOTE — PROGRESS NOTES
Pt arrived via cart from PACU with fentanyl PCA -confirmed settings from report from Kary Lindquist. Instructed pt on use of PCA and pt's  not to be pushing her PCA button for her, she can be encouraged to do so but she must choose to do so herself, he voices understanding and says he will not push the button. She is tearful and finds it difficult to get comfortable on her back keeping her RT  leg straight.  Rt groin drsg intact, no bleeding, no hematoma and 2+ PP.

## 2018-09-11 NOTE — DISCHARGE INSTRUCTIONS
Patient armband removed and given to patient to take home. Patient was informed of the privacy risks if armband lost or stolen    [email or web address] Activation    Thank you for requesting access to [facility]. Please follow the instructions below to securely access and download your online medical record. [facility] allows you to send messages to your doctor, view your test results, renew your prescriptions, schedule appointments, and more. How Do I Sign Up? 1. In your internet browser, go to [email or web address]  2. Click on the First Time User? Click Here link in the Sign In box. You will be redirect to the New Member Sign Up page. 3. Enter your [facility] Access Code exactly as it appears below. You will not need to use this code after youve completed the sign-up process. If you do not sign up before the expiration date, you must request a new code. [facility] Access Code: Activation code not generated  Current [facility] Status: Active (This is the date your [facility] access code will )    4. Enter the last four digits of your Social Security Number (xxxx) and Date of Birth (mm/dd/yyyy) as indicated and click Submit. You will be taken to the next sign-up page. 5. Create a [facility]agen ID. This will be your [facility] login ID and cannot be changed, so think of one that is secure and easy to remember. 6. Create a [facility] password. You can change your password at any time. 7. Enter your Password Reset Question and Answer. This can be used at a later time if you forget your password. 8. Enter your e-mail address. You will receive e-mail notification when new information is available in [address] 9. Click Sign Up. You can now view and download portions of your medical record. 10. Click the Download Summary menu link to download a portable copy of your medical information.     Additional Information    If you have questions, please visit the Frequently Asked Questions section of the [facility] website at [email or web address] Remember, MyChart is NOT to be used for urgent needs. For medical emergencies, dial 911. DISCHARGE SUMMARY from Nurse    PATIENT INSTRUCTIONS:    After general anesthesia or intravenous sedation, for 24 hours or while taking prescription Narcotics:  · Limit your activities  · Do not drive and operate hazardous machinery  · Do not make important personal or business decisions  · Do  not drink alcoholic beverages  · If you have not urinated within 8 hours after discharge, please contact your surgeon on call. Report the following to your surgeon:  · Excessive pain, swelling, redness or odor of or around the surgical area  · Temperature over 100.5  · Nausea and vomiting lasting longer than 4 hours or if unable to take medications  · Any signs of decreased circulation or nerve impairment to extremity: change in color, persistent  numbness, tingling, coldness or increase pain  · Any questions    What to do at Home:  Recommended activity: Activity as tolerated    If you experience any of the following symptoms Nausea,vomiting, diarrhea, chest pain, shortness of breath  , please follow up with PCP. *  Please give a list of your current medications to your Primary Care Provider. *  Please update this list whenever your medications are discontinued, doses are      changed, or new medications (including over-the-counter products) are added. *  Please carry medication information at all times in case of emergency situations. These are general instructions for a healthy lifestyle:    No smoking/ No tobacco products/ Avoid exposure to second hand smoke  Surgeon General's Warning:  Quitting smoking now greatly reduces serious risk to your health.     Obesity, smoking, and sedentary lifestyle greatly increases your risk for illness    A healthy diet, regular physical exercise & weight monitoring are important for maintaining a healthy lifestyle    You may be retaining fluid if you have a history of heart failure or if you experience any of the following symptoms:  Weight gain of 3 pounds or more overnight or 5 pounds in a week, increased swelling in our hands or feet or shortness of breath while lying flat in bed. Please call your doctor as soon as you notice any of these symptoms; do not wait until your next office visit. Recognize signs and symptoms of STROKE:    F-face looks uneven    A-arms unable to move or move unevenly    S-speech slurred or non-existent    T-time-call 911 as soon as signs and symptoms begin-DO NOT go       Back to bed or wait to see if you get better-TIME IS BRAIN. Warning Signs of HEART ATTACK     Call 911 if you have these symptoms:   Chest discomfort. Most heart attacks involve discomfort in the center of the chest that lasts more than a few minutes, or that goes away and comes back. It can feel like uncomfortable pressure, squeezing, fullness, or pain.  Discomfort in other areas of the upper body. Symptoms can include pain or discomfort in one or both arms, the back, neck, jaw, or stomach.  Shortness of breath with or without chest discomfort.  Other signs may include breaking out in a cold sweat, nausea, or lightheadedness. Don't wait more than five minutes to call 911 - MINUTES MATTER! Fast action can save your life. Calling 911 is almost always the fastest way to get lifesaving treatment. Emergency Medical Services staff can begin treatment when they arrive -- up to an hour sooner than if someone gets to the hospital by car. The discharge information has been reviewed with the patient. The patient verbalized understanding. Discharge medications reviewed with the patient and appropriate educational materials and side effects teaching were provided.   ___________________________________________________________________________________________________________________________________  Uterine Fibroid Embolization: What to Expect at Home  Your Recovery    You can expect to feel better each day, although you may get tired quickly and need pain medicine for several days. You may need about 7 to 10 days to fully recover. Many women have mild to severe cramps for several days after uterine fibroid embolization. You may also have mild nausea or a low fever for 4 or 5 days. Some women have vaginal bleeding or grayish or brownish vaginal discharge for several weeks. These are all normal side effects of the treatment. Your next few menstrual cycles may be heavier than normal. Some women pass tissue for up to 3 months after the procedure. It is important to avoid heavy lifting for about a week. This care sheet gives you a general idea about how long it will take for you to recover. But each person recovers at a different pace. Follow the steps below to get better as quickly as possible. How can you care for yourself at home? Activity    · Rest when you feel tired. Getting enough sleep will help you recover.     · Try to walk each day. Start out by walking a little more than you did the day before. Bit by bit, increase the amount you walk. Walking boosts blood flow and helps prevent pneumonia and constipation.     · For 1 week, avoid lifting anything that would make you strain. This may include a child, heavy grocery bags and milk containers, a heavy briefcase or backpack, cat litter or dog food bags, or a vacuum .     · Avoid strenuous activities, such as biking, jogging, weightlifting, and aerobic exercise, for 4 weeks.     · You may shower. Do not take a bath for a few days or until your doctor tells you it is okay.     · You may have some vaginal bleeding. Wear sanitary pads if needed. Do not douche or use tampons.     · Ask your doctor when you can drive again.     · You will probably need to take 1 week off work. It depends on the type of work you do and how you feel.     · Your doctor will tell you when you can have sex again. Diet    · You can eat your normal diet. If your stomach is upset, try bland, low-fat foods like plain rice, broiled chicken, toast, and yogurt.     · Drink plenty of fluids (unless your doctor tells you not to).     · You may notice that your bowel movements are not regular right after your surgery. This is common. Try to avoid constipation and straining with bowel movements. You may want to take a fiber supplement every day. If you have not had a bowel movement after a couple of days, ask your doctor about taking a mild laxative. Medicines    · Your doctor will tell you if and when you can restart your medicines. He or she will also give you instructions about taking any new medicines.     · If you take blood thinners, such as warfarin (Coumadin), clopidogrel (Plavix), or aspirin, be sure to talk to your doctor. He or she will tell you if and when to start taking those medicines again. Make sure that you understand exactly what your doctor wants you to do.     · Be safe with medicines. Take pain medicines exactly as directed. ¨ If the doctor gave you a prescription medicine for pain, take it as prescribed. ¨ If you are not taking a prescription pain medicine, ask your doctor if you can take an over-the-counter medicine.     · If you think your pain medicine is making you sick to your stomach:  ¨ Take your medicine after meals (unless your doctor tells you not to). ¨ Ask your doctor for a different pain medicine.     · If your doctor prescribed antibiotics, take them as directed. Do not stop taking them just because you feel better. You need to take the full course of antibiotics. Other instructions    · Wear loose, comfortable clothing and avoid anything that puts pressure on your belly for a few days.     · You may want to use a heating pad on your belly to help with pain. Follow-up care is a key part of your treatment and safety.  Be sure to make and go to all appointments, and call your doctor if you are having problems. It's also a good idea to know your test results and keep a list of the medicines you take. When should you call for help? Call 911 anytime you think you may need emergency care. For example, call if:    · You passed out (lost consciousness).     · You have chest pain, are short of breath, or cough up blood.    Call your doctor now or seek immediate medical care if:    · You have bright red vaginal bleeding that soaks one or more pads in an hour, or you have large clots.     · You are sick to your stomach or cannot drink fluids.     · You have vaginal discharge that has increased in amount or smells bad.     · You have signs of infection, such as:  ¨ Increased pain, swelling, warmth, or redness. ¨ A fever.     · You have signs of a blood clot in your leg (called a deep vein thrombosis), such as:  ¨ Pain in your calf, back of the knee, thigh, or groin. ¨ Redness and swelling in your leg or groin.     · You have pain that does not get better after you take pain medicine.     · You are bleeding from the area where the catheter was put in your artery.     · You have a fast-growing, painful lump at the catheter site.    Watch closely for any changes in your health, and be sure to contact your doctor if you have any problems. Where can you learn more? Go to http://juan ramon-chuy.info/. Enter 05.10.06.41.20 in the search box to learn more about \"Uterine Fibroid Embolization: What to Expect at Home. \"  Current as of: October 6, 2017  Content Version: 11.7  © 0887-5315 Healthwise, Incorporated. Care instructions adapted under license by [employer] (which disclaims liability or warranty for this information). If you have questions about a medical condition or this instruction, always ask your healthcare professional. [address] any warranty or liability for your use of this information. Patient armband removed and given to patient to take home.   Patient was informed of the privacy risks if armband lost or stolen    [employer] Activation    Thank you for requesting access to [facility]. Please follow the instructions below to securely access and download your online medical record. [facility] allows you to send messages to your doctor, view your test results, renew your prescriptions, schedule appointments, and more. How Do I Sign Up?    11. In your internet browser, go to [email or web address]  12. Click on the First Time User? Click Here link in the Sign In box. You will be redirect to the New Member Sign Up page. 15. Enter your [facility] Access Code exactly as it appears below. You will not need to use this code after youve completed the sign-up process. If you do not sign up before the expiration date, you must request a new code. [facility] Access Code: Activation code not generated  Current [facility] Status: Active (This is the date your [facility] access code will )    14. Enter the last four digits of your Social Security Number (xxxx) and Date of Birth (mm/dd/yyyy) as indicated and click Submit. You will be taken to the next sign-up page. 15. Create a [facility] ID. This will be your [facility] login ID and cannot be changed, so think of one that is secure and easy to remember. 12. Create a [facility] password. You can change your password at any time. 16. Enter your Password Reset Question and Answer. This can be used at a later time if you forget your password. 25. Enter your e-mail address. You will receive e-mail notification when new information is available in [address] 19. Click Sign Up. You can now view and download portions of your medical record. 20. Click the Download Summary menu link to download a portable copy of your medical information. Additional Information    If you have questions, please visit the Frequently Asked Questions section of the [facility] website at [email or web address] Remember, [facility] is NOT to be used for urgent needs. For medical emergencies, dial 911. DISCHARGE SUMMARY from Nurse    PATIENT INSTRUCTIONS:    After general anesthesia or intravenous sedation, for 24 hours or while taking prescription Narcotics:  · Limit your activities  · Do not drive and operate hazardous machinery  · Do not make important personal or business decisions  · Do  not drink alcoholic beverages  · If you have not urinated within 8 hours after discharge, please contact your surgeon on call. Report the following to your surgeon:  · Excessive pain, swelling, redness or odor of or around the surgical area  · Temperature over 100.5  · Nausea and vomiting lasting longer than 4 hours or if unable to take medications  · Any signs of decreased circulation or nerve impairment to extremity: change in color, persistent  numbness, tingling, coldness or increase pain  · Any questions    What to do at Home:  Recommended activity: Activity as tolerated    If you experience any of the following symptoms Nausea,vomiting, diarrhea, chest pain, shortness of breath  , please follow up with PCP. *  Please give a list of your current medications to your Primary Care Provider. *  Please update this list whenever your medications are discontinued, doses are      changed, or new medications (including over-the-counter products) are added. *  Please carry medication information at all times in case of emergency situations. These are general instructions for a healthy lifestyle:    No smoking/ No tobacco products/ Avoid exposure to second hand smoke  Surgeon General's Warning:  Quitting smoking now greatly reduces serious risk to your health.     Obesity, smoking, and sedentary lifestyle greatly increases your risk for illness    A healthy diet, regular physical exercise & weight monitoring are important for maintaining a healthy lifestyle    You may be retaining fluid if you have a history of heart failure or if you experience any of the following symptoms:  Weight gain of 3 pounds or more overnight or 5 pounds in a week, increased swelling in our hands or feet or shortness of breath while lying flat in bed. Please call your doctor as soon as you notice any of these symptoms; do not wait until your next office visit. Recognize signs and symptoms of STROKE:    F-face looks uneven    A-arms unable to move or move unevenly    S-speech slurred or non-existent    T-time-call 911 as soon as signs and symptoms begin-DO NOT go       Back to bed or wait to see if you get better-TIME IS BRAIN. Warning Signs of HEART ATTACK     Call 911 if you have these symptoms:   Chest discomfort. Most heart attacks involve discomfort in the center of the chest that lasts more than a few minutes, or that goes away and comes back. It can feel like uncomfortable pressure, squeezing, fullness, or pain.  Discomfort in other areas of the upper body. Symptoms can include pain or discomfort in one or both arms, the back, neck, jaw, or stomach.  Shortness of breath with or without chest discomfort.  Other signs may include breaking out in a cold sweat, nausea, or lightheadedness. Don't wait more than five minutes to call 911 - MINUTES MATTER! Fast action can save your life. Calling 911 is almost always the fastest way to get lifesaving treatment. Emergency Medical Services staff can begin treatment when they arrive -- up to an hour sooner than if someone gets to the hospital by car. The discharge information has been reviewed with the patient. The patient verbalized understanding. Discharge medications reviewed with the patient and appropriate educational materials and side effects teaching were provided.   ___________________________________________________________________________________________________________________________________

## 2018-09-11 NOTE — ROUTINE PROCESS
Bedside and Verbal shift change report given to Penelope Hansen (oncoming nurse) by Esther Yanes (offgoing nurse). Report included the following information SBAR, Kardex, Intake/Output, MAR and Recent Results. Rate verify completed per protocol.

## 2018-09-11 NOTE — PROGRESS NOTES
conducted an initial consultation and Spiritual Assessment for Alea Manzano, who is a 37 y.o.,female. Patients Primary Language is: [place]. According to the patients EMR Christianity Affiliation is: Sharon Reynolds. The reason the Patient came to the hospital is:   Patient Active Problem List    Diagnosis Date Noted    Fibroids 09/10/2018    Controlled type 2 diabetes mellitus with microalbuminuria, without long-term current use of insulin ([facility] [address] 08/28/2018    Dyslipidemia 06/01/2018    Hypertensive heart disease without heart failure 06/01/2018    Morbid obesity ([facility] [address] 06/01/2018    History of positive PPD 11/21/2017    BMI 40.0-44.9, adult ([facility] [address] 11/09/2017    Controlled type 2 diabetes mellitus with diabetic nephropathy, without long-term current use of insulin ([facility] [address] 11/09/2017    Microalbuminuria- h/o angioedema on ace 11/09/2017    Episodic tension-type headache, not intractable 08/03/2017    Pure hypercholesterolemia 11/08/2016    Tobacco use 10/25/2016    Bilateral carpal tunnel syndrome 10/25/2016    Positive H. pylori test 06/10/2014    Iron deficiency anemia 06/10/2014    Vitamin D deficiency 06/10/2014    Anxiety and depression 06/05/2014    Gastroesophageal reflux disease 01/04/2014    HTN (hypertension) 09/26/2012        The  provided the following Interventions:  Initiated a relationship of care and support. Listened empathically. Provided chaplaincy education. Provided information about Spiritual Care Services. Offered prayer and assurance of continued prayers on patient's behalf. Chart reviewed. The following outcomes where achieved:  Patient shared limited information about both their medical narrative and spiritual journey/beliefs. Patient processed feeling about hospital discharge. Patient expressed gratitude for 's visit.     Assessment:  Patient does not have any Sikhism/cultural needs that will affect patients preferences in health care. There are no spiritual or Taoism issues which require intervention at this time. Plan:  Chaplains will continue to follow and will provide pastoral care on an as needed/requested basis.  recommends bedside caregivers page  on duty if patient shows signs of acute spiritual or emotional distress.     Chaplain Resident [facility]   [phone]

## 2018-09-11 NOTE — PROGRESS NOTES
Problem: Pressure Injury - Risk of  Goal: *Prevention of pressure injury  Document Famiila Scale and appropriate interventions in the flowsheet.    Outcome: Progressing Towards Goal  Pressure Injury Interventions:       Moisture Interventions: Absorbent underpads, Internal/External urinary devices, Maintain skin hydration (lotion/cream)    Activity Interventions: Pressure redistribution bed/mattress(bed type)    Mobility Interventions: HOB 30 degrees or less, Pressure redistribution bed/mattress (bed type)    Nutrition Interventions: Document food/fluid/supplement intake    Friction and Shear Interventions: HOB 30 degrees or less, Minimize layers

## 2018-09-11 NOTE — PROGRESS NOTES
Observation notice provided in writing to patient and/or caregiver as well as verbal explanation of the policy.   Patients who are in outpatient status also receive the Observation notice    Franca Rogers,  [id number] Specialist

## 2018-09-11 NOTE — NURSE NAVIGATOR
Reason for Admission:   Uterine Fibroids                   RRAT Score:       13              Plan for utilizing home health:      None at present, patient given list of available agencies in the event she needs to chose one. Likelihood of Readmission:  low                         Transition of Care Plan:                      Patient lives with her spouse in a 2 story apartment. Her bedroom and bath are on the second floor. Pt was independent in self care and ADL's prior to admission and she doesn't anticipate any problems when discharged home this admission. Her mother in law will be available to transport her home. Her spouse has a learning disability. She is in the middle of changing jobs and is applying for [employer] insurance and/or cobra. Care Management Interventions  PCP Verified by CM:  Yes  Last Visit to PCP: 05/07/18  Mode of Transport at Discharge: Self  Current Support Network: Lives with Spouse  Confirm Follow Up Transport: Family

## 2018-09-11 NOTE — ROUTINE PROCESS
Bedside and Verbal shift change report given to Linwood Arora RN (oncoming nurse) by Nani Duncan RN (offgoing nurse). Report included the following information SBAR, Kardex, Procedure Summary, MAR and Recent Results.

## 2018-09-11 NOTE — PROGRESS NOTES
Pt will transported home by family, pt provided indigent medication the only thing qualified for assistance was Ciproflaxcian 500 mg.

## 2018-09-11 NOTE — ROUTINE PROCESS
Bedside and Verbal shift change report given to Erika FERRARI (oncoming nurse) by Marine Huffman RN (offgoing nurse). Report included the following information SBAR, Kardex, MAR and Recent Results. SITUATION:    Code Status: Prior   Reason for Admission: Submucous uterine fibroid [D25.0]    [facility] day: 0   Problem List:       Hospital Problems  Date Reviewed: 8/28/2018          Codes Class Noted POA    Fibroids ICD-10-CM: D25.9  ICD-9-CM: 218.9  9/10/2018 Unknown              BACKGROUND:    Past Medical History:   Past Medical History:   Diagnosis Date    Abnormal uterine bleeding (AUB)     Anxiety and depression     Asthma     Cardiac echocardiogram 01/27/2017    EF 55-60%. No WMA. Mod conc LVH. Gr 2 DDfx. Mild AI.  Cardiac nuclear imaging test 01/27/2017    Mod risk. Mod area of anterior apical ischemia likely. No infarction. EF 48%. Mild anterior apical hypk. Equivocal EKG on pharm stress test.   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 repeat in 10 years. 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 pylori test 6/9/14    placed on antibiotics and H2 blocker 6/11/14    Positive PPD, treated 2000    PPD positive, treated     Renal duplex 11/17/2016    No significant RA stenosis.       S/P cardiac catheterization 07/2017    No significant obstructive coronary disease, EF 55%, LVEDP 15 mmHg    Vitamin D deficiency 6/9/14         Patient taking anticoagulants no     ASSESSMENT:    Changes in Assessment Throughout Shift: no     Patient has Central Line: no Reasons if yes:    Patient has Ramírez Cath: yes Reasons if yes: unable to move due to removal of groin sheath post op      Last Vitals:     Vitals:    09/10/18 1308 09/10/18 1615 09/10/18 1803 09/10/18 2018   BP: 134/74 (!) 161/106 (!) 155/102 (!) 164/93   Pulse: 71 91 76 97   Resp:  9 16 16   Temp: 98.4 °F (36.9 °C) 97.6 °F (36.4 °C) 97.7 °F (36.5 °C) 97 °F (36.1 °C)   SpO2: 98% 100%     Weight: 104.8 kg (231 lb)      Height: 5' 3\" (1.6 m)           IV and DRAINS (will only show if present)   Peripheral IV 09/10/18 Right Antecubital-Site Assessment: Clean, dry, & intact  Peripheral IV 09/10/18 Right Hand-Site Assessment: Clean, dry, & intact     WOUND (if present)   Wound Type:  none   Dressing present Dressing Present : No   Wound Concerns/Notes:  none     PAIN    Pain Assessment    Pain Intensity 1: 7 (09/10/18 2018)    Pain Location 1: Abdomen    Pain Intervention(s) 1: Medication (see MAR)    Patient Stated Pain Goal: 3  o Interventions for Pain:  PCA  o Intervention effective: no  o Time of last intervention:    o Reassessment Completed: continueous      Last 3 Weights:  Last 3 Recorded Weights in this Encounter    09/10/18 1308   Weight: 104.8 kg (231 lb)     Weight change:      INTAKE/OUPUT    Current Shift:      Last three shifts: 09/09 0701 - 09/10 1900  In: 120 [I.V.:120]  Out: -      LAB RESULTS     Recent Labs      09/10/18   1248   WBC  5.0   HGB  13.0   HCT  36.9   PLT  273        Recent Labs      09/10/18   1248   NA  138   K  3.6   GLU  123*   BUN  10   CREA  0.81   CA  9.5   INR  0.9       RECOMMENDATIONS AND DISCHARGE PLANNING     1. Pending tests/procedures/ Plan of Care or Other Needs: removal ramírez at 0400, keep rt leg straight until 10pm     2. Discharge plan for patient and Needs/Barriers: dc to home tomorrow    3.  Estimated Discharge Date: 9/11/18 Posted on Whiteboard in [facility]: yes      4. The patient's care plan was reviewed with the oncoming nurse. \"HEALS\" SAFETY CHECK      Fall Risk    Total Score: 3    Safety Measures: Safety Measures: Bed/Chair alarm on, Bed/Chair-Wheels locked, Bed in low position, Call light within reach, Family at bedside, Nurse at bedside, Side rails X 3    A safety check occurred in the patient's room between off going nurse and oncoming nurse listed above. The safety check included the below items  Area Items   H  High Alert Medications - Verify all high alert medication drips (heparin, PCA, etc.)   E  Equipment - Suction is set up for ALL patients (with mellissa)  - Red plugs utilized for all equipment (IV pumps, etc.)  - WOWs wiped down at end of shift.  - Room stocked with oxygen, suction, and other unit-specific supplies   A  Alarms - Bed alarm is set for fall risk patients  - Ensure chair alarm is in place and activated if patient is up in a chair   L  Lines - Check IV for any infiltration  - Denton bag is empty if patient has a Denton   - Tubing and IV bags are labeled   S  Safety   - Room is clean, patient is clean, and equipment is clean. - Hallways are clear from equipment besides carts. - Fall bracelet on for fall risk patients  - Ensure room is clear and free of clutter  - Suction is set up for ALL patients (with mellissa)  - Hallways are clear from equipment besides carts.    - Isolation precautions followed, supplies available outside room, sign posted     Emigdio Trevino RN

## 2018-09-11 NOTE — PROGRESS NOTES
Problem: Falls - Risk of  Goal: *Absence of Falls  Document Jose Fall Risk and appropriate interventions in the flowsheet.    Outcome: Progressing Towards Goal  Fall Risk Interventions:       Mentation Interventions: Adequate sleep, hydration, pain control, Door open when patient unattended, Evaluate medications/consider consulting pharmacy, Reorient patient, Bed/chair exit alarm    Medication Interventions: Bed/chair exit alarm, Evaluate medications/consider consulting pharmacy    Elimination Interventions: Bed/chair exit alarm, Call light in reach, Patient to call for help with toileting needs

## 2018-09-11 NOTE — ROUTINE PROCESS
7257- Assessment completed. Patient with fentanyl PCA at 10 mcg/6 minute lockout. Patient asking nurse to explain the PCA. Patient instructed about use of the PCA. Patient reports that she does not want to go to sleep because she will wake up in extreme pain. Patient reports \"I had a panic attack earlier tonight\" . Nurse instructed patient to push the button when she was in pain. Patient moans periodically. Patient reports that the pain is like her menstrual cycle pain, but worse. No vaginal bleeding noted. Dressing to right groin is dry and intact. Patient instructed to not bend forward at the waist due to the right groin site. Patient verbalized understanding. 4630- Dr. Evie Hutton called about elevated blood pressure 179/107. Orders  Received. Orders to decrease fentanyl PCA dose to 5  Mcg/6 minute lockout received.  0301- Fentanyl PCA dose changed to 5 mcg/6 minute lockout. 0330- hydralzine 10 mg given slow IV push for /108 manually. 0415- blood pressure 166/96 at present time. 0530- Ramírez catheter removed. 0630- patient having intermittent stabbing pains to mid pelvis area. Patient using PCA to control pain. Patient has not voided since ramírez removed.

## 2018-09-25 ENCOUNTER — OFFICE VISIT (OUTPATIENT)
Dept: FAMILY MEDICINE CLINIC | Age: 43
End: 2018-09-25

## 2018-09-25 VITALS
HEART RATE: 88 BPM | HEIGHT: 63 IN | DIASTOLIC BLOOD PRESSURE: 86 MMHG | WEIGHT: 228 LBS | TEMPERATURE: 98.6 F | OXYGEN SATURATION: 98 % | SYSTOLIC BLOOD PRESSURE: 134 MMHG | BODY MASS INDEX: 40.4 KG/M2 | RESPIRATION RATE: 16 BRPM

## 2018-09-25 DIAGNOSIS — E78.00 PURE HYPERCHOLESTEROLEMIA: ICD-10-CM

## 2018-09-25 DIAGNOSIS — E11.21 CONTROLLED TYPE 2 DIABETES MELLITUS WITH DIABETIC NEPHROPATHY, WITHOUT LONG-TERM CURRENT USE OF INSULIN (HCC): ICD-10-CM

## 2018-09-25 DIAGNOSIS — E55.9 VITAMIN D DEFICIENCY: ICD-10-CM

## 2018-09-25 DIAGNOSIS — Z23 ENCOUNTER FOR ADMINISTRATION OF VACCINE: ICD-10-CM

## 2018-09-25 DIAGNOSIS — I10 ESSENTIAL HYPERTENSION: Primary | ICD-10-CM

## 2018-09-25 DIAGNOSIS — D50.0 IRON DEFICIENCY ANEMIA DUE TO CHRONIC BLOOD LOSS: ICD-10-CM

## 2018-09-25 DIAGNOSIS — Z72.0 TOBACCO USE: ICD-10-CM

## 2018-09-25 NOTE — PATIENT INSTRUCTIONS

## 2018-09-25 NOTE — PROGRESS NOTES
Alea HayesGradyElana, 37 y.o.,  female    SUBJECTIVE   ff-up    Underwent uterine fibroid s/p embolization by IR 9/10/2018. She reports to have tolerated procedure well. She is in some pain. Voiding and passing stool and gas. DM w/ microalbuminuria- taking metformin, h/o angioedema on ACE. She does not check glucose at home. HTN-reviewed cardiology notes, adjusted medication. D/katlyn clonidine, started on bb, and continued other antihypertensives ccb, aldactone, chlorthalidone. She continues to smoke. She had cardiac catheterization 7/17 without significant evidence of CAD    HL on lipitor    anemia-improved, secondary to DUB, previous GI work up neg. Plan for [facility] vs [facility], dr. Melissa Genao following. carpal tunnel b/l responding well to  gabapentin     h/o dx with bipolar disorder/anxiety/depression/adhd- started seeing Dr Alma Torres [place], and started on adderall/zoloft, reports improvement of overall mood/work performance.       ROS:  See HPI, all others negative        Patient Active Problem List   Diagnosis Code    HTN (hypertension) I10    Gastroesophageal reflux disease K21.9    Anxiety and depression F41.9, F32.9    Positive H. pylori test A04.8    Iron deficiency anemia D50.9    Vitamin D deficiency E55.9    Tobacco use Z72.0    Bilateral carpal tunnel syndrome G56.03    Pure hypercholesterolemia E78.00    Episodic tension-type headache, not intractable G44.219    BMI 40.0-44.9, adult ([facility]) Z68.41    Controlled type 2 diabetes mellitus with diabetic nephropathy, without long-term current use of insulin ([facility]) E11.21    Microalbuminuria- h/o angioedema on ace R80.9    History of positive PPD R76.11    Dyslipidemia E78.5    Hypertensive heart disease without heart failure I11.9    Morbid obesity (HCC) E66.01    Controlled type 2 diabetes mellitus with microalbuminuria, without long-term current use of insulin ([facility]) E11.29, R80.9    Fibroids D25.9 Current Outpatient Prescriptions   Medication Sig Dispense Refill    oxyCODONE-acetaminophen (PERCOCET) 5-325 mg per tablet Take 1 Tab by mouth every six (6) hours as needed. Max Daily Amount: 4 Tabs. 12 Tab 0    dextroamphetamine-amphetamine (ADDERALL) 20 mg tablet 1 tablet in the morning and afternoon (2 times a day)      butalbital-acetaminophen-caff (FIORICET) -40 mg per capsule Take 1 tablet every 8 hours as needed for headache 12 Cap 0    carvedilol (COREG) 6.25 mg tablet Take 4 Tabs by mouth two (2) times daily (with meals). 180 Tab 3    amLODIPine (NORVASC) 10 mg tablet Take 1 Tab by mouth daily. 90 Tab 3    chlorthalidone (HYGROTEN) 25 mg tablet daily. 3    spironolactone (ALDACTONE) 25 mg tablet   3    raNITIdine (ZANTAC) 150 mg tablet   2    ondansetron (ZOFRAN ODT) 4 mg disintegrating tablet   0    montelukast (SINGULAIR) 10 mg tablet   2    ferrous sulfate 325 mg (65 mg iron) tablet TAKE ONE TABLET BY MOUTH ONCE DAILY BEFORE BREAKFAST 30 Tab 5    diclofenac EC (VOLTAREN) 75 mg EC tablet TAKE ONE TABLET BY MOUTH TWICE DAILY AS NEEDED 60 Tab 5    gabapentin (NEURONTIN) 400 mg capsule 1 three times a day 90 Cap 3    norethindrone-ethinyl estradiol-iron (MICROGESTIN FE1.5/30) 1.5 mg-30 mcg (21)/75 mg (7) tab Take 1 Tab by mouth daily. Indications: Abnormal Uterine Bleeding 1 Package 12    metFORMIN (GLUCOPHAGE) 1,000 mg tablet Take 1 Tab by mouth two (2) times daily (with meals). 180 Tab 1    atorvastatin (LIPITOR) 80 mg tablet Take 1 Tab by mouth daily. 90 Tab 2    Blood-Glucose Meter (FREESTYLE LITE METER) monitoring kit Check fasting glucose once daily 1 Kit 0    glucose blood VI test strips (FREESTYLE LITE STRIPS) strip Check fasting glucose once daily 100 Strip 2    Lancets misc Check fasting glucose once daily 1 Each 11    cholecalciferol, vitamin D3, (VITAMIN D3) 2,000 unit tab Take  by mouth.  sertraline (ZOLOFT) 50 mg tablet Take 75 mg by mouth nightly.       albuterol (PROVENTIL, VENTOLIN) 90 mcg/actuation inhaler Take 2 Puffs by inhalation every four (4) hours as needed for Wheezing. 17 g 0       Allergies   Allergen Reactions    Ace Inhibitors Swelling       Past Medical History:   Diagnosis Date    Abnormal uterine bleeding (AUB)     Anxiety and depression     Asthma     Cardiac echocardiogram 01/27/2017    EF 55-60%. No WMA. Mod conc LVH. Gr 2 DDfx. Mild AI.  Cardiac nuclear imaging test 01/27/2017    Mod risk. Mod area of anterior apical ischemia likely. No infarction. EF 48%. Mild anterior apical hypk. Equivocal EKG on pharm stress test.   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 repeat in 10 years. 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 pylori test 6/9/14    placed on antibiotics and H2 blocker 6/11/14    Positive PPD, treated 2000    PPD positive, treated     Renal duplex 11/17/2016    No significant RA stenosis.  S/P cardiac catheterization 07/2017    No significant obstructive coronary disease, EF 55%, LVEDP 15 mmHg    Vitamin D deficiency 6/9/14       Social History     Social History    Marital status:      Spouse name: N/A    Number of children: N/A    Years of education: N/A     Occupational History    Not on file.      Social History Main Topics    Smoking status: Current Every Day Smoker     Packs/day: 0.50     Years: 0.50    Smokeless tobacco: Never Used    Alcohol use 1.2 oz/week     2 Cans of beer per week      Comment: monthly 1 or 2    Drug use: Yes     Special: Marijuana, Cocaine      Comment: Cocaine stopped 2008, Marijuana 4/2/16 usually once a month or two    Sexual activity: Not Currently     Partners: Female, Male     Birth control/ protection: None     Other Topics Concern   [address] Service Yes     [id number]    Blood Transfusions Yes     2012    Caffeine Concern No    Occupational Exposure No    Hobby Hazards No    Sleep Concern No    Stress Concern Yes    Weight Concern No    Special Diet No    Back Care No    Exercise No    Bike Helmet No    Seat Belt No    Self-Exams No     Social History Narrative       Family History   Problem Relation Age of Onset    Hypertension Mother     Diabetes Mother     Depression Mother     Substance Abuse Mother      Tobacco use    Migraines Mother     High Cholesterol Mother     Heart Attack Mother     Heart Failure Mother     Eczema Mother     Arthritis-osteo Mother     Hypertension Father     Diabetes Father     Substance Abuse Father      Tobacco use and drug abuse    Stroke Paternal Aunt     Heart Disease Maternal Grandfather     Hypertension Maternal Grandfather     High Cholesterol Maternal Grandfather     Diabetes Maternal Grandfather     Stroke Maternal Grandfather     Heart Attack Maternal Grandfather     Heart Failure Maternal Grandfather     Alcohol abuse Brother      Drug/Alcohol abuse    Substance Abuse Maternal Grandmother      Tobacco use - MGM, MGF, and brother,    Diabetes Maternal Grandmother     Hypertension Brother     High Cholesterol Brother     Diabetes Sister     Diabetes Paternal Grandmother     Heart Attack Brother     Heart Failure Brother          OBJECTIVE    Physical Exam:     Visit Vitals    /86 (BP 1 Location: Left arm, BP Patient Position: Sitting)    Pulse 88    Temp 98.6 °F (37 °C)    Resp 16    Ht 5' 3\" (1.6 m)    Wt 228 lb (103.4 kg)    SpO2 98%    BMI 40.39 kg/m2       General: alert, obese, AA, in no apparent distress or pain  Neck: supple, no adenopathy palpated  CVS: normal rate, regular rhythm, distinct S1 and S2  Lungs:clear to ausculation bilaterally, no crackles, wheezing or rhonchi noted  Abdomen: soft, non tender  Feet: no lesions, normal monofilament  Bilateral great toe thickened.    Psych:  mood and affect normal    Results for orders placed or performed during the hospital encounter of 09/10/18   HCG QL SERUM   Result Value Ref Range    HCG, Ql. NEGATIVE  NEG     METABOLIC PANEL, BASIC   Result Value Ref Range    Sodium 138 136 - 145 mmol/L    Potassium 3.6 3.5 - 5.5 mmol/L    Chloride 105 100 - 108 mmol/L    CO2 23 21 - 32 mmol/L    Anion gap 10 3.0 - 18 mmol/L    Glucose 123 (H) 74 - 99 mg/dL    BUN 10 7.0 - 18 MG/DL    Creatinine 0.81 0.6 - 1.3 MG/DL    BUN/Creatinine ratio 12 12 - 20      GFR est AA >60 >60 ml/min/1.73m2    GFR est non-AA >60 >60 ml/min/1.73m2    Calcium 9.5 8.5 - 10.1 MG/DL   CBC W/O DIFF   Result Value Ref Range    WBC 5.0 4.6 - 13.2 K/uL    RBC 4.02 (L) 4.20 - 5.30 M/uL    HGB 13.0 12.0 - 16.0 g/dL    HCT 36.9 35.0 - 45.0 %    MCV 91.8 74.0 - 97.0 FL    MCH 32.3 24.0 - 34.0 PG    MCHC 35.2 31.0 - 37.0 g/dL    RDW 13.5 11.6 - 14.5 %    PLATELET 184 667 - 668 K/uL    MPV 9.8 9.2 - 11.8 FL   PROTHROMBIN TIME + INR   Result Value Ref Range    Prothrombin time 12.3 11.5 - 15.2 sec    INR 0.9 0.8 - 1.2     PTT   Result Value Ref Range    aPTT 26.6 23.0 - 36.4 SEC   DRUG SCREEN, URINE   Result Value Ref Range    BENZODIAZEPINES NEGATIVE  NEG      BARBITURATES POSITIVE (A) NEG      THC (TH-CANNABINOL) NEGATIVE  NEG      OPIATES NEGATIVE  NEG      PCP(PHENCYCLIDINE) NEGATIVE  NEG      COCAINE NEGATIVE  NEG      AMPHETAMINES NEGATIVE  NEG      METHADONE NEGATIVE  NEG      HDSCOM (NOTE)    GLUCOSE, POC   Result Value Ref Range    Glucose (POC) 124 (H) 70 - 110 mg/dL   GLUCOSE, POC   Result Value Ref Range Glucose (POC) 136 (H) 70 - 110 mg/dL   GLUCOSE, POC   Result Value Ref Range    Glucose (POC) 165 (H) 70 - 110 mg/dL   GLUCOSE, POC   Result Value Ref Range    Glucose (POC) 166 (H) 70 - 110 mg/dL         ASSESSMENT/PLAN  Alea was seen today for hand pain. Diagnoses and all orders for this visit:  Diabetes mellitus without complication  Improving 1.5>4.3>3>4.3  Cont  metformin to 1000 mg bid  Eye exam-9/17- given dr. Zurdo Ribeiro # to schedule  Foot exam- 8/18  Microalbumin-11/17 positive, h/o angioedema on ACE. Optimize glucose control for now. Lipid panel -8/17at goal LDL <100, cont lipitor dose 80 mg qhs  a1c -5/18  Check cmp/lipid panel/a1c/ microalbumin in 3months    Essential hypertension  Controlled, cont current regimen  on bb, coreg, norvasc, chlorthalidone, aldactone  Intolerant to ACE  Counseled on smoking cessation    Iron deficiency anemia due to chronic blood loss   likely due to menorrhagia,   Improving, hgb 10.08>12.1>13  Cont care with dr. Ramya Ahuja  Colonoscopy/egd 4/2016 normal, internal hemorrhoids noted. .   Cont po fe supplementation    Vitamin D deficiency  Improved, cont 1000 iu daily  Check vit d piror to next visit    Anxiety and depression  On zoloft and adderall, now following [facility] ([facility])    Pure hypercholesterolemia-  At goal on lipitor 80 mg qhs, to cont    Tobacco use  Counseled on cessation    Carpal tunnel  Controlled, on  gabapentin, wants to hold off on surgery    Myofascial pain  HEP rec'd  Dr Sandy Moeller for vaccine  3rd dose hep B vaccine    Fibroids  S/p embolization  Advised ff-up with gyne      BMI 40.0-44.9, adult ([address]  Encouraged wt loss    Ff-up with me in 3 months    Patient understands plan of care. Patient has provided input and agrees with goals.

## 2018-09-25 NOTE — PROGRESS NOTES
1. Have you been to the ER, urgent care clinic since your last visit? Hospitalized since your last visit? No    2. Have you seen or consulted any other health care providers outside of the [address] [place] [address] since your last visit? Include any pap smears or colon screening. .   Dr. Yoav Ferrell,    Hep B, 1 ml given IM in left deltoid. Lot # [id number], exp date 09/18/2020. Patient tolerated injection well. No adverse reaction noted.

## 2018-09-25 NOTE — MR AVS SNAPSHOT
[facility] [address] [facility] 
[phone] Patient: Tracy Lu MRN: [id number] :1975 Visit Information Date & Time Provider Department Dept. Phone Encounter #  
 2018  4:15 PM Jojo Posadas, [address] [facility] [id number] Follow-up Instructions Return if symptoms worsen or fail to improve, keep appt in decemeber sooner or prn. Your Appointments 2018  3:45 PM  
FOLLOW UP EXAM with Jojo Posadas MD  
[address] [place] [address] ([facility]) Appt Note: routine f/u 3mo [address] [id number] [address] 1101 [facility] [address] 200 [facility]  
  
    
 2018  4:00 PM  
Follow Up with Mala Nichols MD  
[facility] ([facility]) Appt Note: 6 month follow up [facility] [id number] [address] [address] [phone] [address] [place] [address] [address] Upcoming Health Maintenance Date Due Influenza Age 5 to Adult 2018 FOOT EXAM Q1 8/3/2018 PAP AKA CERVICAL CYTOLOGY 2018 EYE EXAM RETINAL OR DILATED Q1 2018 HEMOGLOBIN A1C Q6M 2018 MICROALBUMIN Q1 2018 LIPID PANEL Q1 2018 DTaP/Tdap/Td series (2 - Td) 10/25/2026 Allergies as of 2018  Review Complete On: 2018 By: Jojo Posadas MD  
  
 Severity Noted Reaction Type Reactions Ace Inhibitors  2012    Swelling Current Immunizations  Reviewed on 2018 Name Date Hep B Vaccine (Adult) 2018, 2017  4:02 PM  
 Influenza Vaccine (Quad) PF 2017  1:22 PM, 10/25/2016 Influenza Vaccine PF 2013 Pneumococcal Polysaccharide (PPSV-23) 10/25/2016 Tdap 10/25/2016 Not reviewed this visit Vitals BP Pulse Temp Resp Height(growth percentile) Weight(growth percentile) 134/86 (BP 1 Location: Left arm, BP Patient Position: Sitting) 88 98.6 °F (37 °C) 16 5' 3\" (1.6 m) 228 lb (103.4 kg) SpO2 BMI OB Status Smoking Status 98% 40.39 kg/m2 Having regular periods Current Every Day Smoker Vitals History BMI and BSA Data Body Mass Index Body Surface Area  
 40.39 kg/m 2 2.14 m 2 Preferred Pharmacy Pharmacy Name Phone [facility] Cow[facility] [address] [place] [address]. [phone] Your Updated Medication List  
  
   
This list is accurate as of 9/25/18  4:57 PM.  Always use your most recent med list.  
  
  
  
  
 ADDERALL 20 mg tablet Generic drug:  dextroamphetamine-amphetamine 1 tablet in the morning and afternoon (2 times a day)  
  
 albuterol 90 mcg/actuation inhaler Commonly known as:  Kathrin Schirmer Take 2 Puffs by inhalation every four (4) hours as needed for Wheezing. amLODIPine 10 mg tablet Commonly known as:  Horris Bills Take 1 Tab by mouth daily. atorvastatin 80 mg tablet Commonly known as:  LIPITOR Take 1 Tab by mouth daily. Blood-Glucose Meter monitoring kit Commonly known as:  FREESTYLE LITE METER Check fasting glucose once daily  
  
 butalbital-acetaminophen-caff -40 mg per capsule Commonly known as:  [employer] Take 1 tablet every 8 hours as needed for headache  
  
 carvedilol 6.25 mg tablet Commonly known as:  Mariaa Chew Take 4 Tabs by mouth two (2) times daily (with meals). chlorthalidone 25 mg tablet Commonly known as:  HYGROTEN  
daily. diclofenac EC 75 mg EC tablet Commonly known as:  VOLTAREN  
TAKE ONE TABLET BY MOUTH TWICE DAILY AS NEEDED  
  
 ferrous sulfate 325 mg (65 mg iron) tablet TAKE ONE TABLET BY MOUTH ONCE DAILY BEFORE BREAKFAST  
  
 gabapentin 400 mg capsule Commonly known as:  NEURONTIN  
1 three times a day  
  
 glucose blood VI test strips strip Commonly known as:  FREESTYLE LITE STRIPS Check fasting glucose once daily Lancets Misc Check fasting glucose once daily  
  
 metFORMIN 1,000 mg tablet Commonly known as:  GLUCOPHAGE Take 1 Tab by mouth two (2) times daily (with meals). montelukast 10 mg tablet Commonly known as:  SINGULAIR  
  
 norethindrone-ethinyl estradiol-iron 1.5 mg-30 mcg (21)/75 mg (7) Tab Commonly known as:  [place] FE1.5/30 Take 1 Tab by mouth daily. Indications: Abnormal Uterine Bleeding  
  
 ondansetron 4 mg disintegrating tablet Commonly known as:  ZOFRAN ODT  
  
 oxyCODONE-acetaminophen 5-325 mg per tablet Commonly known as:  PERCOCET Take 1 Tab by mouth every six (6) hours as needed. Max Daily Amount: 4 Tabs. raNITIdine 150 mg tablet Commonly known as:  ZANTAC  
  
 sertraline 50 mg tablet Commonly known as:  ZOLOFT Take 75 mg by mouth nightly. spironolactone 25 mg tablet Commonly known as:  ALDACTONE  
  
 VITAMIN D3 2,000 unit Tab Generic drug:  cholecalciferol (vitamin D3) Take  by mouth. Follow-up Instructions Return if symptoms worsen or fail to improve, keep appt in decemeber sooner or prn. Patient Instructions Stopping Smoking: Care Instructions Your Care Instructions Cigarette smokers crave the nicotine in cigarettes. Giving it up is much harder than simply changing a habit. Your body has to stop craving the nicotine. It is hard to quit, but you can do it. There are many tools that people use to quit smoking. You may find that combining tools works best for you. There are several steps to quitting. First you get ready to quit. Then you get support to help you. After that, you learn new skills and behaviors to become a nonsmoker. For many people, a necessary step is getting and using medicine. Your doctor will help you set up the plan that best meets your needs.  You may want to attend a smoking cessation program to help you quit smoking. When you choose a program, look for one that has proven success. Ask your doctor for ideas. You will greatly increase your chances of success if you take medicine as well as get counseling or join a cessation program. 
Some of the changes you feel when you first quit tobacco are uncomfortable. Your body will miss the nicotine at first, and you may feel short-tempered and grumpy. You may have trouble sleeping or concentrating. Medicine can help you deal with these symptoms. You may struggle with changing your smoking habits and rituals. The last step is the tricky one: Be prepared for the smoking urge to continue for a time. This is a lot to deal with, but keep at it. You will feel better. Follow-up care is a key part of your treatment and safety. Be sure to make and go to all appointments, and call your doctor if you are having problems. It's also a good idea to know your test results and keep a list of the medicines you take. How can you care for yourself at home? · Ask your family, friends, and coworkers for support. You have a better chance of quitting if you have help and support. · Join a support group, such as Nicotine Anonymous, for people who are trying to quit smoking. · Consider signing up for a smoking cessation program, such as the American Lung Association's Freedom from Smoking program. 
· Get text messaging support. Go to the website at www.smokefree. gov to sign up for the [facility] program. 
· Set a quit date. Pick your date carefully so that it is not right in the middle of a big deadline or stressful time. Once you quit, do not even take a puff. Get rid of all ashtrays and lighters after your last cigarette. Clean your house and your clothes so that they do not smell of smoke. · Learn how to be a nonsmoker. Think about ways you can avoid those things that make you reach for a cigarette. ¨ Avoid situations that put you at greatest risk for smoking. For some people, it is hard to have a drink with friends without smoking. For others, they might skip a coffee break with coworkers who smoke. ¨ Change your daily routine. Take a different route to work or eat a meal in a different place. · Cut down on stress. Calm yourself or release tension by doing an activity you enjoy, such as reading a book, taking a hot bath, or gardening. · Talk to your doctor or pharmacist about nicotine replacement therapy, which replaces the nicotine in your body. You still get nicotine but you do not use tobacco. Nicotine replacement products help you slowly reduce the amount of nicotine you need. These products come in several forms, many of them available over-the-counter: ¨ Nicotine patches ¨ Nicotine gum and lozenges ¨ Nicotine inhaler · Ask your doctor about bupropion (Wellbutrin) or varenicline (Chantix), which are prescription medicines. They do not contain nicotine. They help you by reducing withdrawal symptoms, such as stress and anxiety. · Some people find hypnosis, acupuncture, and massage helpful for ending the smoking habit. · Eat a healthy diet and get regular exercise. Having healthy habits will help your body move past its craving for nicotine. · Be prepared to keep trying. Most people are not successful the first few times they try to quit. Do not get mad at yourself if you smoke again. Make a list of things you learned and think about when you want to try again, such as next week, next month, or next year. Where can you learn more? Go to http://juan ramon-chuy.info/. Enter J142 in the search box to learn more about \"Stopping Smoking: Care Instructions. \" Current as of: November 29, 2017 Content Version: 11.7 © 4802-5502 [employer].  Care instructions adapted under license by [employer] (which disclaims liability or warranty for this information). If you have questions about a medical condition or this instruction, always ask your healthcare professional. [address] any warranty or liability for your use of this information. Introducing [facility]! Dear Radha Beatty: Thank you for requesting a [employer] account. Our records indicate that you already have an active [employer] account. You can access your account anytime at [email or web address] Did you know that you can access your hospital and ER discharge instructions at any time in [employer]? You can also review all of your test results from your hospital stay or ER visit. Additional Information If you have questions, please visit the Frequently Asked Questions section of the [employer] website at [email or web address] Remember, [employer] is NOT to be used for urgent needs. For medical emergencies, dial 911. Now available from your iPhone and Android! Please provide this summary of care documentation to your next provider. Your primary care clinician is listed as Ayan Ellis. If you have any questions after today's visit, please call [phone].

## 2018-10-12 RX ORDER — GABAPENTIN 400 MG/1
CAPSULE ORAL
Qty: 90 CAP | Refills: 3 | Status: SHIPPED | OUTPATIENT
Start: 2018-10-12

## 2019-07-30 NOTE — ANESTHESIA PREPROCEDURE EVALUATION
Arthralgia    Arthralgia is the term for pain in or around the joint. It is a symptom, not a disease. This pain may involve one or more joints. In some cases, the pain moves from joint to joint.  There are many causes for joint pain. These include:  · Injury  · Osteoarthritis (wearing out of the joint surface)  · Gout (inflammation of the joint due to crystals in the joint fluid)  · Infection inside the joint    · Bursitis (inflammation of the fluid-filled sacs around the joint)  · Autoimmune disorders such as rheumatoid arthritis or lupus  · Tendonitis (inflammation of chords that attach muscle to bone)  Home care  · Rest the involved joint(s) until your symptoms improve.   · You may be prescribed pain medicine. If none is prescribed, you may use acetaminophen or ibuprofen to control pain and inflammation.  Follow-up care  Follow up with your healthcare provider or as advised.  When to seek medical advice  Contact your healthcare provider right away if any of the following occurs:  · Pain, swelling, or redness of joint increases  · Pain worsens or recurs after a period of improvement  · Pain moves to other joints  · You cannot bear weight on the affected joint   · You cannot move the affected joint  · Joint appears deformed  · New rash appears  · Fever of 100.4ºF (38ºC) or higher, or as directed by your healthcare provider  Date Last Reviewed: 3/1/2017  © 1035-2647 [employer]. [address] [place] [address], [place] [address]. All rights reserved. This information is not intended as a substitute for professional medical care. Always follow your healthcare professional's instructions.         Anesthetic History   No history of anesthetic complications            Review of Systems / Medical History  Patient summary reviewed, nursing notes reviewed and pertinent labs reviewed    Pulmonary          Smoker  Asthma : well controlled       Neuro/Psych   Within defined limits           Cardiovascular    Hypertension: well controlled                   GI/Hepatic/Renal     GERD: well controlled           Endo/Other    Diabetes: well controlled, type 2    Morbid obesity     Other Findings   Comments: Documentation of current medication  Current medications obtained, documented and obtained? YES      Risk Factors for Postoperative nausea/vomiting:       History of postoperative nausea/vomiting? NO       Female? YES       Motion sickness? NO       Intended opioid administration for postoperative analgesia? NO      Smoking Abstinence:  Current Smoker? YES  Elective Surgery? YES  Seen preoperatively by anesthesiologist or proxy prior to day of surgery? YES  Pt abstained from smoking 24 hours prior to anesthesia?  N/A    Preventive care/screening for High Blood Pressure:  Aged 18 years and older: YES  Screened for high blood pressure: YES  Patients with high blood pressure referred to primary care provider   for BP management: YES                     Physical Exam    Airway  Mallampati: II  TM Distance: 4 - 6 cm  Neck ROM: normal range of motion, short neck   Mouth opening: Normal     Cardiovascular    Rhythm: regular  Rate: normal         Dental  No notable dental hx       Pulmonary  Breath sounds clear to auscultation               Abdominal  GI exam deferred       Other Findings            Anesthetic Plan    ASA: 3  Anesthesia type: MAC          Induction: Intravenous  Anesthetic plan and risks discussed with: Patient

## 2019-09-30 ENCOUNTER — HOSPITAL ENCOUNTER (OUTPATIENT)
Dept: ULTRASOUND IMAGING | Age: 44
Discharge: HOME | End: 2019-09-30
Attending: NURSE PRACTITIONER
Payer: MEDICAID

## 2019-09-30 DIAGNOSIS — K46.9 INTERSTITIAL HERNIA: ICD-10-CM

## 2019-09-30 PROCEDURE — 76705 ECHO EXAM OF ABDOMEN: CPT

## 2020-10-19 ENCOUNTER — HOSPITAL ENCOUNTER (EMERGENCY)
Age: 45
Discharge: HOME | End: 2020-10-19
Attending: EMERGENCY MEDICINE
Payer: MEDICAID

## 2020-10-19 VITALS
HEART RATE: 99 BPM | OXYGEN SATURATION: 98 % | TEMPERATURE: 98 F | SYSTOLIC BLOOD PRESSURE: 170 MMHG | RESPIRATION RATE: 18 BRPM | DIASTOLIC BLOOD PRESSURE: 102 MMHG

## 2020-10-19 DIAGNOSIS — M25.50 POLYARTHRALGIA: Primary | ICD-10-CM

## 2020-10-19 PROCEDURE — 99282 EMERGENCY DEPT VISIT SF MDM: CPT

## 2020-10-19 RX ORDER — METHYLPREDNISOLONE 4 MG/1
TABLET ORAL
Qty: 1 DOSE PACK | Refills: 0 | Status: SHIPPED | OUTPATIENT
Start: 2020-10-19

## 2020-10-19 NOTE — ED TRIAGE NOTES
Pt says he has chronic pain in her hands, elbows, shoulders, and knees.  Pt says her cembalta and gabapentin are not helping

## 2020-10-19 NOTE — DISCHARGE INSTRUCTIONS

## 2020-10-19 NOTE — ED PROVIDER NOTES
EMERGENCY DEPARTMENT HISTORY AND PHYSICAL EXAM    5:15 PM      Date: 10/19/2020  Patient Name: Yan Fernandez    History of Presenting Illness     Chief Complaint   Patient presents with    Pain (Chronic)         History Provided By: Patient    Additional History (Context): Yan Fernandez is a 50 y.o. female with past medical history of diffuse joint pain and (see below) who presents with chronic pains in the hands, elbows, shoulders, and knees. She is taking Cymbalta and gabapentin which is not helping. She is requesting a refill of a prednisone taper which has helped in the past.  She has seen rheumatology and is waiting for a new referral for a second opinion at this time. She denies any injury, trauma, redness, swelling, or fevers. PCP: Merari Sepulveda MD    Current Outpatient Medications   Medication Sig Dispense Refill    methylPREDNISolone (Medrol, Frederick,) 4 mg tablet Take according to package instructions 1 Dose Pack 0    gabapentin (NEURONTIN) 400 mg capsule TAKE ONE CAPSULE BY MOUTH THREE TIMES DAILY 90 Cap 3    oxyCODONE-acetaminophen (PERCOCET) 5-325 mg per tablet Take 1 Tab by mouth every six (6) hours as needed. Max Daily Amount: 4 Tabs. 12 Tab 0    dextroamphetamine-amphetamine (ADDERALL) 20 mg tablet 1 tablet in the morning and afternoon (2 times a day)      butalbital-acetaminophen-caff (FIORICET) -40 mg per capsule Take 1 tablet every 8 hours as needed for headache 12 Cap 0    carvedilol (COREG) 6.25 mg tablet Take 4 Tabs by mouth two (2) times daily (with meals). 180 Tab 3    amLODIPine (NORVASC) 10 mg tablet Take 1 Tab by mouth daily. 90 Tab 3    chlorthalidone (HYGROTEN) 25 mg tablet daily.   3    spironolactone (ALDACTONE) 25 mg tablet   3    raNITIdine (ZANTAC) 150 mg tablet   2    ondansetron (ZOFRAN ODT) 4 mg disintegrating tablet   0    montelukast (SINGULAIR) 10 mg tablet   2    ferrous sulfate 325 mg (65 mg iron) tablet TAKE ONE TABLET BY MOUTH ONCE DAILY BEFORE BREAKFAST 30 Tab 5    diclofenac EC (VOLTAREN) 75 mg EC tablet TAKE ONE TABLET BY MOUTH TWICE DAILY AS NEEDED 60 Tab 5    norethindrone-ethinyl estradiol-iron (MICROGESTIN FE1.5/30) 1.5 mg-30 mcg (21)/75 mg (7) tab Take 1 Tab by mouth daily. Indications: Abnormal Uterine Bleeding 1 Package 12    metFORMIN (GLUCOPHAGE) 1,000 mg tablet Take 1 Tab by mouth two (2) times daily (with meals). 180 Tab 1    atorvastatin (LIPITOR) 80 mg tablet Take 1 Tab by mouth daily. 90 Tab 2    Blood-Glucose Meter (FREESTYLE LITE METER) monitoring kit Check fasting glucose once daily 1 Kit 0    glucose blood VI test strips (FREESTYLE LITE STRIPS) strip Check fasting glucose once daily 100 Strip 2    Lancets misc Check fasting glucose once daily 1 Each 11    cholecalciferol, vitamin D3, (VITAMIN D3) 2,000 unit tab Take  by mouth.  sertraline (ZOLOFT) 50 mg tablet Take 75 mg by mouth nightly.  albuterol (PROVENTIL, VENTOLIN) 90 mcg/actuation inhaler Take 2 Puffs by inhalation every four (4) hours as needed for Wheezing. 17 g 0       Past History     Past Medical History:  Past Medical History:   Diagnosis Date    Abnormal uterine bleeding (AUB)     Anxiety and depression     Asthma     Cardiac echocardiogram 01/27/2017    EF 55-60%. No WMA. Mod conc LVH. Gr 2 DDfx. Mild AI.  Cardiac nuclear imaging test 01/27/2017    Mod risk. Mod area of anterior apical ischemia likely. No infarction. EF 48%. Mild anterior apical hypk. Equivocal EKG on pharm stress test.   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 repeat in 10 years.    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 pylori test 6/9/14    placed on antibiotics and H2 blocker 6/11/14    Positive PPD, treated 2000    PPD positive, treated     Renal duplex 11/17/2016    No significant RA stenosis.  S/P cardiac catheterization 07/2017    No significant obstructive coronary disease, EF 55%, LVEDP 15 mmHg    Vitamin D deficiency 6/9/14       Past Surgical History:  Past Surgical History:   Procedure Laterality Date    CARDIAC CATHETERIZATION  7/20/2017         CORONARY ARTERY ANGIOGRAM  7/20/2017         ENDOMETRIAL CRYOABLATION      HX COLONOSCOPY  04/25/2016    DR. ESTRADA REPEAT IN 2026 (10 YEARS)    HX HERNIA REPAIR  5017    umbilical    LV ANGIOGRAPHY  7/20/2017         MODERATE SEDATION  7/20/2017            Family History:  Family History   Problem Relation Age of Onset    Hypertension Mother     Diabetes Mother     Depression Mother     Substance Abuse Mother         Tobacco use    Migraines Mother     High Cholesterol Mother     Heart Attack Mother     Heart Failure Mother     Eczema Mother     Arthritis-osteo Mother     Hypertension Father     Diabetes Father     Substance Abuse Father         Tobacco use and drug abuse    Stroke Paternal Aunt     Heart Disease Maternal Grandfather     Hypertension Maternal Grandfather     High Cholesterol Maternal Grandfather     Diabetes Maternal Grandfather     Stroke Maternal Grandfather     Heart Attack Maternal Grandfather     Heart Failure Maternal Grandfather     Alcohol abuse Brother         Drug/Alcohol abuse    Substance Abuse Maternal Grandmother         Tobacco use - MGM, MGF, and brother,    Diabetes Maternal Grandmother     Hypertension Brother     High Cholesterol Brother     Diabetes Sister     Diabetes Paternal Grandmother     Heart Attack Brother     Heart Failure Brother        Social History:  Social History     Tobacco Use    Smoking status: Current Every Day Smoker     Packs/day: 0.50     Years: 0.50     Pack years: 0.25    Smokeless tobacco: Never Used   Substance Use Topics    Alcohol use: Yes     Alcohol/week: 2.0 standard drinks     Types: 2 Cans of beer per week     Comment: monthly 1 or 2    Drug use: Yes     Types: Marijuana, Cocaine     Comment: Cocaine stopped 2008, Marijuana 4/2/16 usually once a month or two       Allergies: Allergies   Allergen Reactions    Ace Inhibitors Swelling         Review of Systems       Review of Systems   Constitutional: Negative. Negative for chills and fever. HENT: Negative. Negative for congestion, ear pain and rhinorrhea. Eyes: Negative. Negative for pain and redness. Respiratory: Negative. Negative for cough and shortness of breath. Cardiovascular: Negative. Negative for chest pain, palpitations and leg swelling. Gastrointestinal: Negative. Negative for abdominal pain, constipation, diarrhea, nausea and vomiting. Genitourinary: Negative. Negative for dysuria, frequency, hematuria and urgency. Musculoskeletal: Positive for arthralgias. Negative for back pain, gait problem, joint swelling, myalgias, neck pain and neck stiffness. Skin: Negative. Negative for rash and wound. Neurological: Negative. Negative for dizziness, seizures, speech difficulty, weakness, light-headedness and headaches. Hematological: Negative for adenopathy. Does not bruise/bleed easily. All other systems reviewed and are negative. Physical Exam     Visit Vitals  BP (!) 170/102   Pulse 99   Temp 98 °F (36.7 °C)   Resp 18   SpO2 97%         Physical Exam  Vitals signs and nursing note reviewed. Constitutional:       General: She is not in acute distress. Appearance: Normal appearance. She is not ill-appearing, toxic-appearing or diaphoretic. HENT:      Head: Normocephalic and atraumatic. Nose: Nose normal.   Eyes:      General: Lids are normal. Vision grossly intact. Conjunctiva/sclera: Conjunctivae normal.      Pupils: Pupils are equal, round, and reactive to light. Neck:      Musculoskeletal: Full passive range of motion without pain and normal range of motion. Cardiovascular:      Rate and Rhythm: Normal rate and regular rhythm. Pulmonary:      Effort: Pulmonary effort is normal.      Breath sounds: Normal breath sounds. Musculoskeletal: Normal range of motion. Right wrist: She exhibits tenderness. She exhibits normal range of motion, no bony tenderness, no swelling, no effusion, no crepitus, no deformity and no laceration. Left wrist: She exhibits tenderness. She exhibits normal range of motion, no bony tenderness, no swelling, no effusion, no crepitus, no deformity and no laceration. Skin:     General: Skin is warm and dry. Capillary Refill: Capillary refill takes less than 2 seconds. Neurological:      General: No focal deficit present. Mental Status: She is alert and oriented to person, place, and time. Psychiatric:         Mood and Affect: Mood normal.         Behavior: Behavior normal. Behavior is cooperative. Diagnostic Study Results     Labs -  No results found for this or any previous visit (from the past 12 hour(s)). Radiologic Studies -   No orders to display         Medical Decision Making   I am the first provider for this patient. I reviewed available nursing notes, past medical history, past surgical history, family history and social history. Vital Signs-Reviewed the patient's vital signs. Records Reviewed: Nursing Notes and Old Medical Records (Time of Review: 5:15 PM)    Pulse Oximetry Analysis - 97% on RA-normal       ED Course: Progress Notes, Reevaluation, and Consults:  5:15 PM  Initial assessment performed.  The patients presenting problems have been discussed, and they/their family are in agreement with the care plan formulated and outlined with them. I have encouraged them to ask questions as they arise throughout their visit. Provider Notes (Medical Decision Making):     Patient is a 49-year-old female presents to the ER with complaints of diffuse joint pains which are chronic in nature. There are no new or acute findings. On physical examination there is no erythema, warmth, swelling, or open wounds to the joints. She has full active range of motion with pain especially to the wrist.  She is requesting a Medrol Dosepak which I feel is reasonable at this time. She is awaiting to see rheumatology at this time. ER precautions discussed. Diagnosis     Clinical Impression:   1. Polyarthralgia        Disposition: Discharged home in stable condition     DISCHARGE NOTE:     Patient has been reexamined. Patient has no new complaints, changes, or physical findings. Care plan outlined and precautions discussed. All medications were reviewed with the patient; will discharge home with Medrol Dosepak. All of patient's questions and concerns were addressed. Patient was instructed and agrees to follow up with rheumatology, as well as to return to the ED upon further deterioration. Patient is ready to go home.     Follow-up Information     Follow up With Specialties Details Why Pernell   Schedule an appointment as soon as possible for a visit Follow-up from the Emergency Department [facility] Andrei Love MD Rheumatology Schedule an appointment as soon as possible for a visit Follow-up from the Emergency Department [address] [place], [place] [id number]  [phone]      [facility] EMERGENCY DEPT Emergency Medicine  As needed, If symptoms worsen [address] [id number] [phone]           Current Discharge Medication List      START taking these medications    Details   methylPREDNISolone (Medrol, Frederick,) 4 mg tablet Take according to package instructions  Qty: 1 Dose Pack, Refills: 0         CONTINUE these medications which have NOT CHANGED    Details   gabapentin (NEURONTIN) 400 mg capsule TAKE ONE CAPSULE BY MOUTH THREE TIMES DAILY  Qty: 90 Cap, Refills: 3    Comments: Please consider 90 day supplies to promote better adherence      oxyCODONE-acetaminophen (PERCOCET) 5-325 mg per tablet Take 1 Tab by mouth every six (6) hours as needed. Max Daily Amount: 4 Tabs. Qty: 12 Tab, Refills: 0    Associated Diagnoses: Submucous uterine fibroid      dextroamphetamine-amphetamine (ADDERALL) 20 mg tablet 1 tablet in the morning and afternoon (2 times a day)      butalbital-acetaminophen-caff (FIORICET) -40 mg per capsule Take 1 tablet every 8 hours as needed for headache  Qty: 12 Cap, Refills: 0      carvedilol (COREG) 6.25 mg tablet Take 4 Tabs by mouth two (2) times daily (with meals). Qty: 180 Tab, Refills: 3      amLODIPine (NORVASC) 10 mg tablet Take 1 Tab by mouth daily. Qty: 90 Tab, Refills: 3      chlorthalidone (HYGROTEN) 25 mg tablet daily. Refills: 3      spironolactone (ALDACTONE) 25 mg tablet Refills: 3      raNITIdine (ZANTAC) 150 mg tablet Refills: 2      ondansetron (ZOFRAN ODT) 4 mg disintegrating tablet Refills: 0      montelukast (SINGULAIR) 10 mg tablet Refills: 2      ferrous sulfate 325 mg (65 mg iron) tablet TAKE ONE TABLET BY MOUTH ONCE DAILY BEFORE BREAKFAST  Qty: 30 Tab, Refills: 5    Comments: Please consider 90 day supplies to promote better adherence      diclofenac EC (VOLTAREN) 75 mg EC tablet TAKE ONE TABLET BY MOUTH TWICE DAILY AS NEEDED  Qty: 60 Tab, Refills: 5    Comments: Please consider 90 day supplies to promote better adherence  Associated Diagnoses: Myofascial pain; Occipital neuralgia, unspecified laterality; Facet syndrome; Neck pain; Pain of lumbar spine;  Thoracic spine pain      norethindrone-ethinyl estradiol-iron (MICROGESTIN FE1.5/30) 1.5 mg-30 mcg (21)/75 mg (7) tab Take 1 Tab by mouth daily. Indications: Abnormal Uterine Bleeding  Qty: 1 Package, Refills: 12    Associated Diagnoses: DUB (dysfunctional uterine bleeding)      metFORMIN (GLUCOPHAGE) 1,000 mg tablet Take 1 Tab by mouth two (2) times daily (with meals). Qty: 180 Tab, Refills: 1      atorvastatin (LIPITOR) 80 mg tablet Take 1 Tab by mouth daily. Qty: 90 Tab, Refills: 2      Blood-Glucose Meter (FREESTYLE LITE METER) monitoring kit Check fasting glucose once daily  Qty: 1 Kit, Refills: 0      glucose blood VI test strips (FREESTYLE LITE STRIPS) strip Check fasting glucose once daily  Qty: 100 Strip, Refills: 2      Lancets misc Check fasting glucose once daily  Qty: 1 Each, Refills: 11      cholecalciferol, vitamin D3, (VITAMIN D3) 2,000 unit tab Take  by mouth. Associated Diagnoses: Left foot pain; Left ankle pain, unspecified chronicity      sertraline (ZOLOFT) 50 mg tablet Take 75 mg by mouth nightly. albuterol (PROVENTIL, VENTOLIN) 90 mcg/actuation inhaler Take 2 Puffs by inhalation every four (4) hours as needed for Wheezing. Qty: 17 g, Refills: 0               Dictation disclaimer:  Please note that this dictation was completed with [employer], the [employer] voice recognition software. Quite often unanticipated grammatical, syntax, homophones, and other interpretive errors are inadvertently transcribed by the computer software. Please disregard these errors. Please excuse any errors that have escaped final proofreading.

## 2020-12-14 ENCOUNTER — OFFICE VISIT (OUTPATIENT)
Dept: ORTHOPEDIC SURGERY | Age: 45
End: 2020-12-14
Payer: MEDICAID

## 2020-12-14 VITALS
WEIGHT: 230.6 LBS | BODY MASS INDEX: 40.86 KG/M2 | OXYGEN SATURATION: 100 % | HEIGHT: 63 IN | SYSTOLIC BLOOD PRESSURE: 166 MMHG | TEMPERATURE: 98.6 F | HEART RATE: 94 BPM | RESPIRATION RATE: 16 BRPM | DIASTOLIC BLOOD PRESSURE: 106 MMHG

## 2020-12-14 DIAGNOSIS — G56.03 BILATERAL CARPAL TUNNEL SYNDROME: Primary | ICD-10-CM

## 2020-12-14 PROCEDURE — 99203 OFFICE O/P NEW LOW 30 MIN: CPT | Performed by: ORTHOPAEDIC SURGERY

## 2020-12-14 NOTE — PROGRESS NOTES
Efe Li is a 50 y.o. female right handed unspecified employment. Worker's Compensation and legal considerations: none filed. Vitals:    12/14/20 1055   BP: (!) 166/106   Pulse: 94   Resp: 16   Temp: 98.6 °F (37 °C)   TempSrc: Temporal   SpO2: 100%   Weight: 230 lb 9.6 oz (104.6 kg)   Height: 5' 3\" (1.6 m)   PainSc:   3   PainLoc: Hand           Chief Complaint   Patient presents with    Hand Pain     mendez hand pain         HPI: Patient comes in today with a history of bilateral severe carpal tunnel syndrome. She had this diagnosed a few years ago but has not had surgery for. She has had injections in the past that only helped minimally. Patient reports a recent hemoglobin A1c of 8.7. Date of onset: Chronic    Injury: No    Prior Treatment:  Yes: Comment: Injections and braces in the past but she no longer has. Numbness/ Tingling: Yes: Comment: Bilateral hands radial digits. ROS: Review of Systems - General ROS: negative  Psychological ROS: negative  ENT ROS: negative  Allergy and Immunology ROS: negative  Hematological and Lymphatic ROS: negative  Respiratory ROS: no cough, shortness of breath, or wheezing  Cardiovascular ROS: no chest pain or dyspnea on exertion  Gastrointestinal ROS: no abdominal pain, change in bowel habits, or black or bloody stools  Musculoskeletal ROS: positive for - pain in hand - bilateral  Neurological ROS: positive for - numbness/tingling  Dermatological ROS: negative    Past Medical History:   Diagnosis Date    Abnormal uterine bleeding (AUB)     Anxiety and depression     Asthma     Cardiac echocardiogram 01/27/2017    EF 55-60%. No WMA. Mod conc LVH. Gr 2 DDfx. Mild AI.  Cardiac nuclear imaging test 01/27/2017    Mod risk. Mod area of anterior apical ischemia likely. No infarction. EF 48%. Mild anterior apical hypk.   Equivocal EKG on pharm stress test.   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 repeat in 10 years. 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 pylori test 6/9/14    placed on antibiotics and H2 blocker 6/11/14    Positive PPD, treated 2000    PPD positive, treated     Renal duplex 11/17/2016    No significant RA stenosis.  S/P cardiac catheterization 07/2017    No significant obstructive coronary disease, EF 55%, LVEDP 15 mmHg    Vitamin D deficiency 6/9/14       Past Surgical History:   Procedure Laterality Date    CARDIAC CATHETERIZATION  7/20/2017         CORONARY ARTERY ANGIOGRAM  7/20/2017         ENDOMETRIAL CRYOABLATION      HX COLONOSCOPY  04/25/2016    DR. ESTRADA REPEAT IN 2026 (10 YEARS)    HX HERNIA REPAIR  1675    umbilical    LV ANGIOGRAPHY  7/20/2017         MODERATE SEDATION  7/20/2017            Current Outpatient Medications   Medication Sig Dispense Refill    gabapentin (NEURONTIN) 400 mg capsule TAKE ONE CAPSULE BY MOUTH THREE TIMES DAILY 90 Cap 3    dextroamphetamine-amphetamine (ADDERALL) 20 mg tablet 1 tablet in the morning and afternoon (2 times a day)      butalbital-acetaminophen-caff (FIORICET) -40 mg per capsule Take 1 tablet every 8 hours as needed for headache 12 Cap 0    carvedilol (COREG) 6.25 mg tablet Take 4 Tabs by mouth two (2) times daily (with meals). 180 Tab 3    amLODIPine (NORVASC) 10 mg tablet Take 1 Tab by mouth daily. 90 Tab 3    chlorthalidone (HYGROTEN) 25 mg tablet daily.   3    spironolactone (ALDACTONE) 25 mg tablet   3    raNITIdine (ZANTAC) 150 mg tablet   2    ondansetron (ZOFRAN ODT) 4 mg disintegrating tablet   0    montelukast (SINGULAIR) 10 mg tablet   2    ferrous sulfate 325 mg (65 mg iron) tablet TAKE ONE TABLET BY MOUTH ONCE DAILY BEFORE BREAKFAST 30 Tab 5    diclofenac EC (VOLTAREN) 75 mg EC tablet TAKE ONE TABLET BY MOUTH TWICE DAILY AS NEEDED 60 Tab 5    norethindrone-ethinyl estradiol-iron (MICROGESTIN FE1.5/30) 1.5 mg-30 mcg (21)/75 mg (7) tab Take 1 Tab by mouth daily. Indications: Abnormal Uterine Bleeding 1 Package 12    metFORMIN (GLUCOPHAGE) 1,000 mg tablet Take 1 Tab by mouth two (2) times daily (with meals). 180 Tab 1    atorvastatin (LIPITOR) 80 mg tablet Take 1 Tab by mouth daily. 90 Tab 2    Blood-Glucose Meter (FREESTYLE LITE METER) monitoring kit Check fasting glucose once daily 1 Kit 0    glucose blood VI test strips (FREESTYLE LITE STRIPS) strip Check fasting glucose once daily 100 Strip 2    Lancets misc Check fasting glucose once daily 1 Each 11    cholecalciferol, vitamin D3, (VITAMIN D3) 2,000 unit tab Take  by mouth.  sertraline (ZOLOFT) 50 mg tablet Take 75 mg by mouth nightly.  albuterol (PROVENTIL, VENTOLIN) 90 mcg/actuation inhaler Take 2 Puffs by inhalation every four (4) hours as needed for Wheezing. 17 g 0    methylPREDNISolone (Medrol, Frederick,) 4 mg tablet Take according to package instructions 1 Dose Pack 0    oxyCODONE-acetaminophen (PERCOCET) 5-325 mg per tablet Take 1 Tab by mouth every six (6) hours as needed. Max Daily Amount: 4 Tabs. 12 Tab 0       Allergies   Allergen Reactions    Ace Inhibitors Swelling           PE:     Physical Exam  Vitals signs and nursing note reviewed. Constitutional:       General: She is not in acute distress. Appearance: Normal appearance. She is not ill-appearing. Eyes:      Extraocular Movements: Extraocular movements intact. Pupils: Pupils are equal, round, and reactive to light.    Neck: Musculoskeletal: Normal range of motion and neck supple. Cardiovascular:      Pulses: Normal pulses. Pulmonary:      Effort: Pulmonary effort is normal. No respiratory distress. Abdominal:      General: Abdomen is flat. There is no distension. Musculoskeletal: Normal range of motion. General: Tenderness present. No swelling. Skin:     General: Skin is warm and dry. Capillary Refill: Capillary refill takes less than 2 seconds. Neurological:      General: No focal deficit present. Mental Status: She is alert and oriented to person, place, and time. Psychiatric:         Mood and Affect: Mood normal.         Behavior: Behavior normal.            NEUROVASCULAR    Examination L R Examination L R   Carpal Comp. + + Pronator Comp. - -   Carpal Tinel + + Pronator Tinel - -   Phalen's + + Pronator Stress - -   Cubital Comp. - - Guyon Comp. - -   Cubital Tinel - - Guyon Tinel - -   Elbow Hyperflexion - - Adson's - -   Spurling's - - SC Comp. - -   PCB Median abn - - SC Tinel - -   Radial Tinel - - IC Comp. - -   Digital Tinel - - IC Tinel - -   Radial 2-Pt WNL WNL Ulnar 2-Pt WNL WNL     Radial Pulse: 2+  Capillary Refill: < 2 sec  Jordi: Not Performed  Digital Jordi: Not Performed        BUE EMG taken on 3/31/2017  NCS/EMG FINDINGS:  · Evaluation of the Left median motor and the Right median motor nerves showed prolonged distal onset latency (L9.8, R10.2 ms). · The Left ulnar motor, the Right ulnar motor, and the Right Median 2nd Digit sensory nerves showed reduced amplitude (L4.1, R3.5, R3.7 µV). · The Left Median 2nd Digit sensory nerve showed prolonged distal peak latency (8.8 ms), reduced amplitude (5.0 µV), and decreased conduction velocity (Wrist-2nd Digit, 16.7 m/s).   · The Left ulnar sensory and Right ulnar sensory nerves were unremarkable.       Imaging:     None indicated        ICD-10-CM ICD-9-CM    1. Bilateral carpal tunnel syndrome  G56.03 354.0 AMB SUPPLY ORDER         Plan: I discussed surgery for the patient in the future but told her that her hemoglobin A1c needs to be below 8 due to the risk of infection. Follow-up and Dispositions    · Return in about 1 month (around 1/14/2021) for follow-up on her repeat A1C.           Plan was reviewed with patient, who verbalized agreement and understanding of the plan

## 2020-12-18 ENCOUNTER — DOCUMENTATION ONLY (OUTPATIENT)
Dept: ORTHOPEDIC SURGERY | Age: 45
End: 2020-12-18

## 2020-12-18 NOTE — PROGRESS NOTES
Patient stopped at the  to drop FMLA forms off at the , as well as her covid test results. Patient requested to have forms faxed [phone] which is not listed on the form. Patient signed a medical release form with the fax number added.  Patient can be reached at [phone]

## 2021-01-19 ENCOUNTER — DOCUMENTATION ONLY (OUTPATIENT)
Dept: ORTHOPEDIC SURGERY | Age: 46
End: 2021-01-19

## 2021-01-19 NOTE — PROGRESS NOTES
Patients forms completed for reduced schedule part time 4 hour days only per Dr. Gopi Sepulveda, copy added to scans bin for patient chart, original in forms files at [facility]. Patient aware.

## 2021-04-12 ENCOUNTER — TELEPHONE (OUTPATIENT)
Dept: ORTHOPEDIC SURGERY | Age: 46
End: 2021-04-12

## 2021-04-12 NOTE — LETTER
NOTIFICATION RETURN TO WORK / SCHOOL    4/13/2021 1:37 PM    Ms. Fernandez  [address] [place] [id number]      To Whom It May Concern:    Jose Antonio Hodges is currently under the care of [address] [place] [address] Kasi Hobbs. She will need to extend her light duty status until she is seen in office on 4/29/2021. If there are questions or concerns please have the patient contact our office.         Sincerely,      Philip Altamirano, DO

## 2021-04-12 NOTE — TELEPHONE ENCOUNTER
Patient came into the [facility] office stating that her light duty is running out on 4/20/2021 and she needs this extended at least until her appointment on 4/29/2021.  Please advise patient at [phone]

## 2021-04-29 ENCOUNTER — OFFICE VISIT (OUTPATIENT)
Dept: ORTHOPEDIC SURGERY | Age: 46
End: 2021-04-29
Payer: MEDICAID

## 2021-04-29 VITALS
RESPIRATION RATE: 16 BRPM | BODY MASS INDEX: 42.7 KG/M2 | HEIGHT: 63 IN | WEIGHT: 241 LBS | HEART RATE: 86 BPM | OXYGEN SATURATION: 98 %

## 2021-04-29 DIAGNOSIS — G56.03 BILATERAL CARPAL TUNNEL SYNDROME: Primary | ICD-10-CM

## 2021-04-29 PROCEDURE — 99213 OFFICE O/P EST LOW 20 MIN: CPT | Performed by: ORTHOPAEDIC SURGERY

## 2021-04-29 PROCEDURE — 20526 THER INJECTION CARP TUNNEL: CPT | Performed by: ORTHOPAEDIC SURGERY

## 2021-04-29 NOTE — LETTER
NOTIFICATION RETURN TO WORK / SCHOOL    4/29/2021 1:38 PM    Ms. Roxie [address] [place] [phone]      To Whom It May Concern:    Sierra Eason is currently under the care of [address] [place], [place] [address]nadeem Hobbs. Patient was seen in our office today She will need to extend her light duty status for the next 4 weeks. If there are questions or concerns please have the patient contact our office.         Sincerely,      Becca Figueroa, DO

## 2021-04-29 NOTE — PROGRESS NOTES
Aristeo Turner is a 48 y.o. female right handed unspecified employment. Worker's Compensation and legal considerations: none filed. Vitals:    04/29/21 1300   Pulse: 86   Resp: 16   SpO2: 98%   Weight: 241 lb (109.3 kg)   Height: 5' 3\" (1.6 m)   PainSc:   5   PainLoc: Hand           Chief Complaint   Patient presents with    Hand Pain     BOTH HANDS       HPI: Patient returns for follow-up of her bilateral carpal tunnel syndrome. At her previous visit we had delayed surgery due to an hemoglobin A1c above 8. She reports it is still above 8 at her most recent test.  She also reports inconsistent Metformin as she does not like to take it at work. Initial HPI: Patient comes in today with a history of bilateral severe carpal tunnel syndrome. She had this diagnosed a few years ago but has not had surgery for. She has had injections in the past that only helped minimally. Patient reports a recent hemoglobin A1c of 8.7. Date of onset: Chronic    Injury: No    Prior Treatment:  Yes: Comment: Injections and braces in the past but she no longer has. Numbness/ Tingling: Yes: Comment: Bilateral hands radial digits. ROS: Review of Systems - General ROS: negative  Psychological ROS: negative  ENT ROS: negative  Allergy and Immunology ROS: negative  Hematological and Lymphatic ROS: negative  Respiratory ROS: no cough, shortness of breath, or wheezing  Cardiovascular ROS: no chest pain or dyspnea on exertion  Gastrointestinal ROS: no abdominal pain, change in bowel habits, or black or bloody stools  Musculoskeletal ROS: positive for - pain in hand - bilateral  Neurological ROS: positive for - numbness/tingling  Dermatological ROS: negative    Past Medical History:   Diagnosis Date    Abnormal uterine bleeding (AUB)     Anxiety and depression     Asthma     Cardiac echocardiogram 01/27/2017    EF 55-60%. No WMA. Mod conc LVH. Gr 2 DDfx. Mild AI.       Cardiac nuclear imaging test 01/27/2017 Mod risk. Mod area of anterior apical ischemia likely. No infarction. EF 48%. Mild anterior apical hypk. Equivocal EKG on pharm stress test.   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 repeat in 10 years. 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 pylori test 6/9/14    placed on antibiotics and H2 blocker 6/11/14    Positive PPD, treated 2000    PPD positive, treated     Renal duplex 11/17/2016    No significant RA stenosis.  S/P cardiac catheterization 07/2017    No significant obstructive coronary disease, EF 55%, LVEDP 15 mmHg    Vitamin D deficiency 6/9/14       Past Surgical History:   Procedure Laterality Date    CARDIAC CATHETERIZATION  7/20/2017         CORONARY ARTERY ANGIOGRAM  7/20/2017         HX COLONOSCOPY  04/25/2016    DR. ESTRADA REPEAT IN 2026 (10 YEARS)    HX HERNIA REPAIR  4450    umbilical    LV ANGIOGRAPHY  7/20/2017         MODERATE SEDATION  7/20/2017         OK ENDOMETRIAL CRYOABLATION         Current Outpatient Medications   Medication Sig Dispense Refill    methylPREDNISolone (Medrol, Frederick,) 4 mg tablet Take according to package instructions 1 Dose Pack 0    gabapentin (NEURONTIN) 400 mg capsule TAKE ONE CAPSULE BY MOUTH THREE TIMES DAILY 90 Cap 3    oxyCODONE-acetaminophen (PERCOCET) 5-325 mg per tablet Take 1 Tab by mouth every six (6) hours as needed. Max Daily Amount: 4 Tabs.  12 Tab 0    dextroamphetamine-amphetamine (ADDERALL) 20 mg tablet 1 tablet in the morning and afternoon (2 times a day)      butalbital-acetaminophen-caff (FIORICET) -40 mg per capsule Take 1 tablet every 8 hours as needed for headache 12 Cap 0    carvedilol (COREG) 6.25 mg tablet Take 4 Tabs by mouth two (2) times daily (with meals). 180 Tab 3    amLODIPine (NORVASC) 10 mg tablet Take 1 Tab by mouth daily. 90 Tab 3    chlorthalidone (HYGROTEN) 25 mg tablet daily. 3    spironolactone (ALDACTONE) 25 mg tablet   3    raNITIdine (ZANTAC) 150 mg tablet   2    ondansetron (ZOFRAN ODT) 4 mg disintegrating tablet   0    montelukast (SINGULAIR) 10 mg tablet   2    ferrous sulfate 325 mg (65 mg iron) tablet TAKE ONE TABLET BY MOUTH ONCE DAILY BEFORE BREAKFAST 30 Tab 5    diclofenac EC (VOLTAREN) 75 mg EC tablet TAKE ONE TABLET BY MOUTH TWICE DAILY AS NEEDED 60 Tab 5    norethindrone-ethinyl estradiol-iron (MICROGESTIN FE1.5/30) 1.5 mg-30 mcg (21)/75 mg (7) tab Take 1 Tab by mouth daily. Indications: Abnormal Uterine Bleeding 1 Package 12    metFORMIN (GLUCOPHAGE) 1,000 mg tablet Take 1 Tab by mouth two (2) times daily (with meals). 180 Tab 1    atorvastatin (LIPITOR) 80 mg tablet Take 1 Tab by mouth daily. 90 Tab 2    Blood-Glucose Meter (FREESTYLE LITE METER) monitoring kit Check fasting glucose once daily 1 Kit 0    glucose blood VI test strips (FREESTYLE LITE STRIPS) strip Check fasting glucose once daily 100 Strip 2    Lancets misc Check fasting glucose once daily 1 Each 11    cholecalciferol, vitamin D3, (VITAMIN D3) 2,000 unit tab Take  by mouth.  sertraline (ZOLOFT) 50 mg tablet Take 75 mg by mouth nightly.  albuterol (PROVENTIL, VENTOLIN) 90 mcg/actuation inhaler Take 2 Puffs by inhalation every four (4) hours as needed for Wheezing. 17 g 0       Allergies   Allergen Reactions    Ace Inhibitors Swelling           PE:     Physical Exam  Vitals signs and nursing note reviewed.    Constitutional: General: She is not in acute distress. Appearance: Normal appearance. She is not ill-appearing. Neck:      Musculoskeletal: Normal range of motion and neck supple. Cardiovascular:      Pulses: Normal pulses. Pulmonary:      Effort: Pulmonary effort is normal.   Musculoskeletal: Normal range of motion. General: No swelling or tenderness. Skin:     General: Skin is warm and dry. Capillary Refill: Capillary refill takes less than 2 seconds. Neurological:      General: No focal deficit present. Mental Status: She is alert and oriented to person, place, and time. Psychiatric:         Mood and Affect: Mood normal.         Behavior: Behavior normal.            NEUROVASCULAR    Examination L R Examination L R   Carpal Comp. + + Pronator Comp. - -   Carpal Tinel + + Pronator Tinel - -   Phalen's + + Pronator Stress - -   Cubital Comp. - - Guyon Comp. - -   Cubital Tinel - - Guyon Tinel - -   Elbow Hyperflexion - - Adson's - -   Spurling's - - SC Comp. - -   PCB Median abn - - SC Tinel - -   Radial Tinel - - IC Comp. - -   Digital Tinel - - IC Tinel - -   Radial 2-Pt WNL WNL Ulnar 2-Pt WNL WNL     Radial Pulse: 2+  Capillary Refill: < 2 sec  Jordi: Not Performed  Digital Jordi: Not Performed        BUE EMG taken on 3/31/2017  NCS/EMG FINDINGS:  · Evaluation of the Left median motor and the Right median motor nerves showed prolonged distal onset latency (L9.8, R10.2 ms). · The Left ulnar motor, the Right ulnar motor, and the Right Median 2nd Digit sensory nerves showed reduced amplitude (L4.1, R3.5, R3.7 µV). · The Left Median 2nd Digit sensory nerve showed prolonged distal peak latency (8.8 ms), reduced amplitude (5.0 µV), and decreased conduction velocity (Wrist-2nd Digit, 16.7 m/s).   · The Left ulnar sensory and Right ulnar sensory nerves were unremarkable.       Imaging:     None indicated        ICD-10-CM ICD-9-CM    1. Bilateral carpal tunnel syndrome  G56.03 354.0 INJECT CARPAL TUNNEL      triamcinolone acetonide (KENALOG) 10 mg/mL injection 10 mg         Plan:     Bilateral carpal tunnel injections and continue nighttime brace wear. I discussed the importance of coming up with an action plan with her primary care physician regarding lowering her hemoglobin A1c below 8. I additionally offered steroid injections today with the caveat that it may elevate her blood sugar temporarily. She would like to get some relief and would like to opt for the steroid injections. Follow-up and Dispositions    · Return in about 3 months (around 7/29/2021) for Reevaluation, hemoglobin A1c follow-up, possible surgical discussion. .          Plan was reviewed with patient, who verbalized agreement and understanding of the plan    [address] [facility] NOTE        Chart reviewed for the following:   Iker RODRIGUES DO, have reviewed the History, Physical and updated the Allergic reactions for Alea Manzano     TIME OUT performed immediately prior to start of procedure:   Iker RODRIGUES DO, have performed the following reviews on Alea Manzano prior to the start of the procedure:            * Patient was identified by name and date of birth   * Agreement on procedure being performed was verified  * Risks and Benefits explained to the patient  * Procedure site verified and marked as necessary  * Patient was positioned for comfort  * Consent was signed and verified     Time: 13:40      Date of procedure: 4/29/2021    Procedure performed by: Cami Rodas DO    Provider assisted by: Yunior Ken LPN    Patient assisted by: self    How tolerated by patient: tolerated the procedure well with no complications    Post Procedural Pain Scale: 0 - No Hurt    Comments: none    Procedure:  After consent was obtained, using sterile technique the bilateral carpal tunnel was prepped. Local anesthetic used: 1% lidocaine.  Kenalog 5 mg X2 and was then injected and the needle withdrawn. The procedure was well tolerated. The patient is asked to continue to rest the area for a few more days before resuming regular activities. It may be more painful for the first 1-2 days. Watch for fever, or increased swelling or persistent pain in the joint. Call or return to clinic prn if such symptoms occur or there is failure to improve as anticipated.

## 2021-05-13 ENCOUNTER — TELEPHONE (OUTPATIENT)
Dept: ORTHOPEDIC SURGERY | Age: 46
End: 2021-05-13

## 2021-05-13 NOTE — TELEPHONE ENCOUNTER
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 She keeps saying the original note extending the status for 4 weeks was written on 04/09/21 but I see it was 04/29/21, which would mean she's under light duty status until 05/27/21. Unsure if she just wants the note that's still valid changed to longer than 4 weeks? Please advise patient back regarding this at [phone].

## 2021-05-14 DIAGNOSIS — Z01.818 PREOP EXAMINATION: Primary | ICD-10-CM

## 2021-12-30 ENCOUNTER — OFFICE VISIT (OUTPATIENT)
Dept: ORTHOPEDIC SURGERY | Age: 46
End: 2021-12-30
Payer: MEDICAID

## 2021-12-30 VITALS — BODY MASS INDEX: 42.73 KG/M2 | HEART RATE: 101 BPM | OXYGEN SATURATION: 100 % | WEIGHT: 241.2 LBS | TEMPERATURE: 97.5 F

## 2021-12-30 DIAGNOSIS — G56.02 LEFT CARPAL TUNNEL SYNDROME: ICD-10-CM

## 2021-12-30 DIAGNOSIS — M77.11 LATERAL EPICONDYLITIS OF RIGHT ELBOW: Primary | ICD-10-CM

## 2021-12-30 PROCEDURE — 99214 OFFICE O/P EST MOD 30 MIN: CPT | Performed by: ORTHOPAEDIC SURGERY

## 2021-12-30 PROCEDURE — 20526 THER INJECTION CARP TUNNEL: CPT | Performed by: ORTHOPAEDIC SURGERY

## 2021-12-30 NOTE — PROGRESS NOTES
Chandan Vergaar is a 48 y.o. female right handed unspecified employment. Worker's Compensation and legal considerations: none filed. Vitals:    12/30/21 1335   Pulse: (!) 101   Temp: 97.5 °F (36.4 °C)   TempSrc: Temporal   SpO2: 100%   Weight: 241 lb 3.2 oz (109.4 kg)   PainSc:   5   PainLoc: Hand           Chief Complaint   Patient presents with    Hand Pain     right       HPI: Patient returns today for follow-up of her left carpal tunnel syndrome. She reports her right side is not too bad. She also reports right elbow pain since last time I saw her. She has also seen her PCP regarding her diabetes and she says that her hemoglobin A1c has actually gone up not down. 4/29/2021 HPI: Patient returns for follow-up of her bilateral carpal tunnel syndrome. At her previous visit we had delayed surgery due to an hemoglobin A1c above 8. She reports it is still above 8 at her most recent test.  She also reports inconsistent Metformin as she does not like to take it at work. Initial HPI: Patient comes in today with a history of bilateral severe carpal tunnel syndrome. She had this diagnosed a few years ago but has not had surgery for. She has had injections in the past that only helped minimally. Patient reports a recent hemoglobin A1c of 8.7. Date of onset: Chronic    Injury: No    Prior Treatment:  Yes: Comment: Injections and braces in the past but she no longer has. Numbness/ Tingling: Yes: Comment: Bilateral hands radial digits.       ROS: Review of Systems - General ROS: negative  Psychological ROS: negative  ENT ROS: negative  Allergy and Immunology ROS: negative  Hematological and Lymphatic ROS: negative  Respiratory ROS: no cough, shortness of breath, or wheezing  Cardiovascular ROS: no chest pain or dyspnea on exertion  Gastrointestinal ROS: no abdominal pain, change in bowel habits, or black or bloody stools  Musculoskeletal ROS: positive for - pain in hand - bilateral  Neurological ROS: positive for - numbness/tingling  Dermatological ROS: negative    Past Medical History:   Diagnosis Date    Abnormal uterine bleeding (AUB)     Anxiety and depression     Asthma     Cardiac echocardiogram 01/27/2017    EF 55-60%. No WMA. Mod conc LVH. Gr 2 DDfx. Mild AI.  Cardiac nuclear imaging test 01/27/2017    Mod risk. Mod area of anterior apical ischemia likely. No infarction. EF 48%. Mild anterior apical hypk. Equivocal EKG on pharm stress test.   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 repeat in 10 years. 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 pylori test 6/9/14    placed on antibiotics and H2 blocker 6/11/14    Positive PPD, treated 2000    PPD positive, treated     Renal duplex 11/17/2016    No significant RA stenosis.  S/P cardiac catheterization 07/2017    No significant obstructive coronary disease, EF 55%, LVEDP 15 mmHg    Vitamin D deficiency 6/9/14       Past Surgical History:   Procedure Laterality Date    CARDIAC CATHETERIZATION  7/20/2017         CORONARY ARTERY ANGIOGRAM  7/20/2017         HX COLONOSCOPY  04/25/2016    DR. ESTRADA REPEAT IN 2026 (10 YEARS)    HX HERNIA REPAIR  4842    umbilical    LV ANGIOGRAPHY  7/20/2017         MODERATE SEDATION  7/20/2017         TX ENDOMETRIAL CRYOABLATION         Current Outpatient Medications   Medication Sig Dispense Refill    methylPREDNISolone (Medrol, Frederick,) 4 mg tablet Take according to package instructions 1 Dose Pack 0    gabapentin (NEURONTIN) 400 mg capsule TAKE ONE CAPSULE BY MOUTH THREE TIMES DAILY 90 Cap 3    oxyCODONE-acetaminophen (PERCOCET) 5-325 mg per tablet Take 1 Tab by mouth every six (6) hours as needed. Max Daily Amount: 4 Tabs. 12 Tab 0    dextroamphetamine-amphetamine (ADDERALL) 20 mg tablet 1 tablet in the morning and afternoon (2 times a day)      butalbital-acetaminophen-caff (FIORICET) -40 mg per capsule Take 1 tablet every 8 hours as needed for headache 12 Cap 0    carvedilol (COREG) 6.25 mg tablet Take 4 Tabs by mouth two (2) times daily (with meals). 180 Tab 3    amLODIPine (NORVASC) 10 mg tablet Take 1 Tab by mouth daily. 90 Tab 3    chlorthalidone (HYGROTEN) 25 mg tablet daily. 3    spironolactone (ALDACTONE) 25 mg tablet   3    raNITIdine (ZANTAC) 150 mg tablet   2    ondansetron (ZOFRAN ODT) 4 mg disintegrating tablet   0    montelukast (SINGULAIR) 10 mg tablet   2    ferrous sulfate 325 mg (65 mg iron) tablet TAKE ONE TABLET BY MOUTH ONCE DAILY BEFORE BREAKFAST 30 Tab 5    diclofenac EC (VOLTAREN) 75 mg EC tablet TAKE ONE TABLET BY MOUTH TWICE DAILY AS NEEDED 60 Tab 5    norethindrone-ethinyl estradiol-iron (MICROGESTIN FE1.5/30) 1.5 mg-30 mcg (21)/75 mg (7) tab Take 1 Tab by mouth daily. Indications: Abnormal Uterine Bleeding 1 Package 12    metFORMIN (GLUCOPHAGE) 1,000 mg tablet Take 1 Tab by mouth two (2) times daily (with meals). 180 Tab 1    atorvastatin (LIPITOR) 80 mg tablet Take 1 Tab by mouth daily. 90 Tab 2    Blood-Glucose Meter (FREESTYLE LITE METER) monitoring kit Check fasting glucose once daily 1 Kit 0    glucose blood VI test strips (FREESTYLE LITE STRIPS) strip Check fasting glucose once daily 100 Strip 2    Lancets misc Check fasting glucose once daily 1 Each 11    cholecalciferol, vitamin D3, (VITAMIN D3) 2,000 unit tab Take  by mouth.       sertraline (ZOLOFT) 50 mg tablet Take 75 mg by mouth nightly.  albuterol (PROVENTIL, VENTOLIN) 90 mcg/actuation inhaler Take 2 Puffs by inhalation every four (4) hours as needed for Wheezing. 17 g 0       Allergies   Allergen Reactions    Ace Inhibitors Swelling           PE:     Physical Exam  Vitals and nursing note reviewed. Constitutional:       General: She is not in acute distress. Appearance: Normal appearance. She is not ill-appearing. Cardiovascular:      Pulses: Normal pulses. Pulmonary:      Effort: Pulmonary effort is normal.   Musculoskeletal:         General: No swelling, tenderness, deformity or signs of injury. Normal range of motion. Cervical back: Normal range of motion and neck supple. Right lower leg: No edema. Left lower leg: No edema. Skin:     General: Skin is warm and dry. Capillary Refill: Capillary refill takes less than 2 seconds. Findings: No bruising or erythema. Neurological:      General: No focal deficit present. Mental Status: She is alert and oriented to person, place, and time. Psychiatric:         Mood and Affect: Mood normal.         Behavior: Behavior normal.          ELBOW:    Examination L R   Tender Lat. Epi. - +   Resisted Wrist Ext. - +   Resisted Finger Ext. - +   Resisted Supination - -   Tender Med Epi. - -   Resisted Wrist Flex. - -   Resisted Finger Ext. - -   Resisted Pronation - -   PIN Compression - -     ROM: Full          NEUROVASCULAR    Examination L R Examination L R   Carpal Comp. + +- Pronator Comp. - -   Carpal Tinel + +- Pronator Tinel - -   Phalen's + +- Pronator Stress - -   Cubital Comp. - - Guyon Comp. - -   Cubital Tinel - - Guyon Tinel - -   Elbow Hyperflexion - - Adson's - -   Spurling's - - SC Comp. - -   PCB Median abn - - SC Tinel - -   Radial Tinel - - IC Comp.  - -   Digital Tinel - - IC Tinel - -   Radial 2-Pt WNL WNL Ulnar 2-Pt WNL WNL     Radial Pulse: 2+  Capillary Refill: < 2 sec  Jordi: Not Performed  [employer]: Not Performed        BU EMG taken on 3/31/2017  NCS/EMG FINDINGS:  · Evaluation of the Left median motor and the Right median motor nerves showed prolonged distal onset latency (L9.8, R10.2 ms). · The Left ulnar motor, the Right ulnar motor, and the Right Median 2nd Digit sensory nerves showed reduced amplitude (L4.1, R3.5, R3.7 µV). · The Left Median 2nd Digit sensory nerve showed prolonged distal peak latency (8.8 ms), reduced amplitude (5.0 µV), and decreased conduction velocity (Wrist-2nd Digit, 16.7 m/s). · The Left ulnar sensory and Right ulnar sensory nerves were unremarkable.       Imaging:     None indicated        ICD-10-CM ICD-9-CM    1. Lateral epicondylitis of right elbow  M77.11 726.32 AMB SUPPLY ORDER   2. Left carpal tunnel syndrome  G56.02 354.0 INJECT CARPAL TUNNEL      triamcinolone acetonide (KENALOG) 10 mg/mL injection 5 mg         Plan:     Left carpal tunnel injection and continue nighttime brace. Patient given right tennis elbow band today. Had another extensive discussion with the patient regarding cessation of smoking as well as getting her diabetes under better control regarding her hemoglobin A1c. Since her last visit he has gone up as opposed to decreasing. Follow-up and Dispositions    · Return if symptoms worsen or fail to improve.           Plan was reviewed with patient, who verbalized agreement and understanding of the plan    [address] [facility] NOTE        Chart reviewed for the following:   kIer RODRIGUES DO, have reviewed the History, Physical and updated the Allergic reactions for Alea Manzano     TIME OUT performed immediately prior to start of procedure:   Iker RODRIGUES DO, have performed the following reviews on Alea Manzano prior to the start of the procedure:            * Patient was identified by name and date of birth   * Agreement on procedure being performed was verified  * Risks and Benefits explained to the patient  * Procedure site verified and marked as necessary  * Patient was positioned for comfort  * Consent was signed and verified     Time: 13:35      Date of procedure: 12/30/2021    Procedure performed by: Radhames Grace DO    Provider assisted by: Juan Luis Nevarez LPN    Patient assisted by: self    How tolerated by patient: tolerated the procedure well with no complications    Post Procedural Pain Scale: 0 - No Hurt    Comments: none    Procedure:  After consent was obtained, using sterile technique the left carpal tunnel was prepped. Local anesthetic used: 1% lidocaine. Kenalog 5 mg and was then injected and the needle withdrawn. The procedure was well tolerated. The patient is asked to continue to rest the area for a few more days before resuming regular activities. It may be more painful for the first 1-2 days. Watch for fever, or increased swelling or persistent pain in the joint. Call or return to clinic prn if such symptoms occur or there is failure to improve as anticipated.

## 2022-01-11 ENCOUNTER — OFFICE VISIT (OUTPATIENT)
Dept: ORTHOPEDIC SURGERY | Age: 47
End: 2022-01-11
Payer: COMMERCIAL

## 2022-01-11 VITALS
TEMPERATURE: 98.4 F | HEIGHT: 63 IN | WEIGHT: 241 LBS | OXYGEN SATURATION: 99 % | BODY MASS INDEX: 42.7 KG/M2 | HEART RATE: 104 BPM

## 2022-01-11 DIAGNOSIS — M17.12 PATELLOFEMORAL ARTHRITIS OF LEFT KNEE: Primary | ICD-10-CM

## 2022-01-11 DIAGNOSIS — M25.562 ACUTE PAIN OF LEFT KNEE: ICD-10-CM

## 2022-01-11 PROCEDURE — 99214 OFFICE O/P EST MOD 30 MIN: CPT | Performed by: ORTHOPAEDIC SURGERY

## 2022-01-11 PROCEDURE — 73560 X-RAY EXAM OF KNEE 1 OR 2: CPT | Performed by: ORTHOPAEDIC SURGERY

## 2022-01-11 RX ORDER — MELOXICAM 15 MG/1
15 TABLET ORAL DAILY
Qty: 30 TABLET | Refills: 1 | Status: SHIPPED | OUTPATIENT
Start: 2022-01-11

## 2022-01-11 NOTE — PROGRESS NOTES
Chad Sánchez  1975   Chief Complaint   Patient presents with    Knee Pain     left knee        HISTORY OF PRESENT ILLNESS  Chad Sánchez is a 48 y.o. female who presents today for reevaluation of left knee. Referred by Dr. Jacquelyn Leach. Patient rates pain as 5/10 today. The pain has been present since October 2021 with no apparent injury. She notes an aching sensation and anterior pain as well as an occasional buckling sensation. 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 She has been limping. The pain is centrally located. Patient denies any fever, chills, chest pain, shortness of breath or calf pain. The remainder of the review of systems is negative. There are no new illness or injuries to report since last seen in the office. There are no changes to medications, allergies, family or social history. PHYSICAL EXAM:   Visit Vitals  Pulse (!) 104   Temp 98.4 °F (36.9 °C) (Temporal)   Ht 5' 3\" (1.6 m)   Wt 241 lb (109.3 kg)   SpO2 99%   BMI 42.69 kg/m²     The patient is a well-developed, well-nourished female   in no acute distress. The patient is alert and oriented times three. The patient is alert and oriented times three. Mood and affect are normal.  LYMPHATIC: lymph nodes are not enlarged and are within normal limits  SKIN: normal in color and non tender to palpation. There are no bruises or abrasions noted. NEUROLOGICAL: Motor sensory exam is within normal limits. Reflexes are equal bilaterally.  There is normal sensation to pinprick and light touch  MUSCULOSKELETAL:  Examination Left knee   Skin Intact   Range of motion 0-130   Effusion -   Medial joint line tenderness -   Lateral joint line tenderness -   Tenderness Pes Bursa -   Tenderness insertion MCL -   Tenderness insertion LCL -   Davids -   Patella crepitus +   Patella grind +   Lachman -   Pivot shift -   Anterior drawer -   Posterior drawer -   Varus stress -   Valgus stress - Neurovascular Intact   Calf Swelling and Tenderness to Palpation -   Cipriano's Test -   Hamstring Cord Tightness +     IMAGING: XR of the left knee with 2 views obtained in the office dated 1/11/2021 was reviewed and read by Dr. Toby Vicente: mild decreased joint space on the medial side      IMPRESSION:      ICD-10-CM ICD-9-CM    1. Patellofemoral arthritis of left knee  M17.12 716.96    2. Acute pain of left knee  M25.562 719.46 AMB POC XRAY, KNEE; 1/2 VIEWS        PLAN:   1. Patient presents today with left knee pain due to patellofemoral arthritis and I would like to start with PT and mobic. Return in 4 weeks. Risk factors include: BMI>40, htn, dm   2. No ultrasound exam indicated today  3. No cortisone injection indicated today   4. Yes Physical/Occupational Therapy indicated today  5. No diagnostic test indicated today:   6. No durable medical equipment indicated today  7. No referral indicated today   8. Yes medications indicated today: MOBIC  9. No Narcotic indicated today     RTC 4 weeks       Scribed by Alejandro Kwok [address]) as dictated by Shilpa Johnson MD    I, Dr. Shilpa Johnson, confirm that all documentation is accurate.     Shilpa Johnson M.D.   [facility]

## 2022-01-24 ENCOUNTER — APPOINTMENT (OUTPATIENT)
Dept: PHYSICAL THERAPY | Age: 47
End: 2022-01-24

## 2022-01-28 ENCOUNTER — HOSPITAL ENCOUNTER (OUTPATIENT)
Dept: PHYSICAL THERAPY | Age: 47
Discharge: HOME | End: 2022-01-28
Payer: COMMERCIAL

## 2022-01-28 PROCEDURE — 97162 PT EVAL MOD COMPLEX 30 MIN: CPT

## 2022-01-28 NOTE — PROGRESS NOTES
[facility] PHYSICAL THERAPY  [address] [place] [address] Precious Desouza, [address] - Phone: [phone]  Fax: [phone] / [address] [facility]  Patient Name: Bryan Tolentino : 1975   Medical   Diagnosis: Left knee pain [M25.562] Treatment Diagnosis: Left Knee Pain   Onset Date: 1.5 Months Ago     Referral Source: [facility] Start of Care [facility]): 2022   Prior Hospitalization: See medical history Provider #: [id number]   Prior Level of Function: Ind   Comorbidities: Heart Disease, Depression, Diabetes, Alcohol Use, HTN, C1-C7 \"pinched nerve\", Tobacco Use   Medications: Verified on Patient Summary List   The Plan of Care and following information is based on the information from the initial evaluation.   ==========================================================================================  Assessment / key information:  Pt is a 48year old female who presents to PT today with left knee pain. The resulting condition has affected their ability to perform work duties and age related activities. Assessment revealed decreased A/PROM and LE strength. Patient will benefit from skilled PT services to address these issues. Pt was provided a HEP today and educated regarding their diagnosis and prognosis. Thank you for this referral.    Christin CAICEDO needed for Ambulation, non-antalgic                                  Stair Negotiation: apprehension with ascending/descending     Palpation: TTP Distal patella and patella tendon                                                       AROM                      PROM                   Strength (1-5)      Left Right Left Right Left Right   Knee Flexion (0-135) 80 110 90 NT 4+ 5     Extension (0) 0 0 NT NT 4 5      Flexibility:   Hamstrings(90/90 Test):                         (L) Tightness= []? WNL   []? Min   []? Mod   [x]?  Severe       Quadriceps (Ely's Test):                       (L) Tightness= []? WNL   []? Min   []? Mod   [x]?  Severe                Lachmans                                       [x]?  Neg    []? Pos  Anterior Drawer                               [x]?  Neg    []? Pos      Pivot Shift                                        [x]?  Neg    []? Pos                   Posterior Drawer                             [x]?  Neg    []? Pos  Posterior Sag                                  [x]?  Neg    []? Pos                Valgus@ 0 Degrees                     [x]?  Neg    []? Pos                 Valgus@ 30 Degrees                  [x]?  Neg    []? Pos  Varus@ 0 Degrees                  [x]?  Neg    []? Pos                 Varus@ 30 Degrees             [x]?  Neg    []? Pos        Outcome Measures:  Single Leg Balance Left: 10\" Right: 10\"     ==========================================================================================  Eval Complexity: History: HIGH Complexity :3+ comorbidities / personal factors will impact the outcome/ POC Exam:MEDIUM Complexity : 3 Standardized tests and measures addressing body structure, function, activity limitation and / or participation in recreation  Presentation: MEDIUM Complexity : Evolving with changing characteristics  Overall Complexity:MEDIUM    Problem List: pain affecting function, decrease ROM, decrease strength, edema affecting function, impaired gait/ balance, decrease ADL/ functional abilitiies, decrease activity tolerance, decrease flexibility/ joint mobility and decrease transfer abilities   Treatment Plan may include any combination of the following: Therapeutic exercise, Therapeutic activities, Neuromuscular re-education, Physical agent/modality, Gait/balance training, Manual therapy, Patient education, Self Care training, Functional mobility training and Home safety training  Patient / Family readiness to learn indicated by: asking questions, trying to perform skills and interest  Persons(s) to be included in education: patient (P)  Barriers to Learning/Limitations: None  Patient Goal (s): \"strengthen and less irritation\"   Patient self reported health status: fair  Rehabilitation Potential: good   Short Term Goals: To be accomplished in  3  weeks:  1. Pt will be compliant with HEP for symptom management at home.  Long Term Goals: To be accomplished in  5  weeks:  1. Pt will be independent with HEP at D/C for self management. 2. Pt will demonstrate knee flexion AROM to at least 100 to improve stair negotiation. 3. Pt will report 75% improvement in condition to improve quality of life. 4. Pt will demonstrate knee extension strength of at least 5/5 to engage in age appropriate activities. Frequency / Duration:   Patient to be seen  2  times per week for 5  weeks:  Patient / Caregiver education and instruction: provided education regarding diagnosis and prognosis as well as details regarding treatment plain. activity modification and exercises  Therapist Signature: Kristin Recinos DPT Date: 8/53/2628   Certification Period: NA Time: 12:45 PM   ===========================================================================================  I certify that the above Physical Therapy Services are being furnished while the patient is under my care. I agree with the treatment plan and certify that this therapy is necessary. Physician Signature:        Date:       Time:     Tara Singer,Carol  Please sign and return to [employer] or you may fax the signed copy to [phone]. Thank you.

## 2022-01-28 NOTE — PROGRESS NOTES
PHYSICAL THERAPY - DAILY TREATMENT NOTE    Patient Name: Luz Oglesby        Date: 2022  : 1975   YES Patient  Verified  Visit #:   1   of   10  Insurance: Payor: Joby Gallego / Plan: [address] / Product Type: Managed Care Medicaid /      In time: 100 Out time: 120   Total Treatment Time: 20     Medicare/BCBS Time Tracking (below)   Total Timed Codes (min):  NA 1:1 Treatment Time:  NA     TREATMENT AREA =  Right Knee    SUBJECTIVE    Pain Level (on 0 to 10 scale):  3  / 10   Medication Changes/New allergies or changes in medical history, any new surgeries or procedures? NO    If yes, update Summary List   Subjective Functional Status/Changes:  []  No changes reported   CC:   Pt arrived 30 minutes late for evaluaton and was not able to start evaluation until 1 hour after her intended arrival time. pt reports left patella pain that began 1.5 months ago while bouncing 2 kids on her knee. Pain began days afterward. Pain with prolonged sitting, laying down, transfers. Denies history of knee. Describes pain as dull, achy, stiff, and sharp. States pain is behind her knee cap. States knee pain decreased when she was prescribed prednisone for her neck pain    HPI:  Symptoms:   Pain rating (0-10):                         Today: 3/10                        Best: 2/10                        Worst: 10/10     PMH: Heart Disease, Depression, Diabetes, Alcohol Use, HTN, C1-C7 \"pinched nerve\", Tobacco Use  Occupation: Registered [employer]  Patient Goals: \"strengthen and less irritation\"     OBJECTIVE     Physical Therapy Evaluation - Knee     OBJECTIVE    Gait:  No AD needed for Ambulation, non-antalgic                                 Stair Negotiation: apprehension with ascending/descending    Palpation: TTP Distal patella and patella tendon     ROM / Strength                                                   AROM                      PROM                   Strength (1-5)      Left Right Left Right Left Right   Hip Flexion (0-120)         4 5     Extension (0-20)         4- 5    IR (0-45)     5 5    ER (0-45)     5 5     Abduction (0-45)         4 5   Knee Flexion (0-135) 80 110 90 NT 4+ 5     Extension (0) 0 0 NT NT 4 5      Flexibility:   Hamstrings(90/90 Test):                         (L) Tightness= [] WNL   [] Min   [] Mod   [x] Severe       Quadriceps (Ely's Test):                         (L) Tightness= [] WNL   [] Min   [] Mod   [x] Severe                  Optional Tests:     Lachmans                                       [x] Neg    [] Pos  Anterior Drawer                               [x] Neg    [] Pos      Pivot Shift                                        [x] Neg    [] Pos                   Posterior Drawer                             [x] Neg    [] Pos  Posterior Sag                                  [x] Neg    [] Pos               Valgus@ 0 Degrees                     [x] Neg    [] Pos                 Valgus@ 30 Degrees                  [x] Neg    [] Pos  Varus@ 0 Degrees                  [x] Neg    [] Pos                 Varus@ 30 Degrees             [x] Neg    [] Pos        Outcome Measures:  Single Leg Balance Left: 10\" Right: 10\"     min Patient Education:  Yes  Reviewed attendance and late policy   []  Progressed/Changed HEP based on: Other Objective/Functional Measures:    See Above     Post Treatment Pain Level (on 0 to 10) scale:   3  / 10     ASSESSMENT    Assessment/Changes in Function:     See POC    Justification for Eval Code Complexity:  Patient History (low 0, mod 1-2, high 3-4): High  Examination (low 1-2, mod 3+, high 4+): Mod (see above)  Clinical Presentation (low stable or uncomplicated, mod evolving or changing, high unstable or unpredictable):  Mod  Clinical Decision Making (low , mod 26-74, high 1-25): FOTO =      []  See Progress Note/Recertification   Patient will continue to benefit from skilled PT services to analyze,, cue,, progress,, modify,, demonstrate,, instruct, and address, movement patterns,, therapeutic interventions,, postural abnormalities,, soft tissue restrictions,, ROM,, strength,, functional mobility,, body mechanics/ergonomics, and home and community integration, to attain remaining goals.    Progress toward goals / Updated goals:    See POC     PLAN    []  Upgrade activities as tolerated YES Continue plan of care   []  Discharge due to :    []  Other:      Therapist: Casper Day DPT    Date: 1/28/2022 Time: 11:44 AM     Future Appointments   Date Time Provider Ras Riggs   1/28/2022 12:30 PM Gutierrez Spearman [facility]   2/15/2022  1:00 PM Annika Britt MD [facility] BS AMB

## 2022-02-07 ENCOUNTER — APPOINTMENT (OUTPATIENT)
Dept: PHYSICAL THERAPY | Age: 47
End: 2022-02-07

## 2022-02-09 ENCOUNTER — APPOINTMENT (OUTPATIENT)
Dept: PHYSICAL THERAPY | Age: 47
End: 2022-02-09

## 2022-02-14 ENCOUNTER — HOSPITAL ENCOUNTER (OUTPATIENT)
Dept: PHYSICAL THERAPY | Age: 47
End: 2022-02-14

## 2022-02-16 ENCOUNTER — APPOINTMENT (OUTPATIENT)
Dept: PHYSICAL THERAPY | Age: 47
End: 2022-02-16

## 2022-02-21 ENCOUNTER — APPOINTMENT (OUTPATIENT)
Dept: PHYSICAL THERAPY | Age: 47
End: 2022-02-21

## 2022-02-22 NOTE — PROGRESS NOTES
[address] [place] [address] PHYSICAL THERAPY  [address] [place] [address] Michaela Desouza, [address] - Phone: [phone]  Fax: [phone]  DISCHARGE SUMMARY FOR PHYSICAL THERAPY          Patient Name: Cash Rodriguez : 1975   Treatment/Medical Diagnosis: Left knee pain [M25.562]   Referral Source: Breanna Corrales [facility]): 2022   Prior Hospitalization: See Medical History Provider #: [id number]   Medications: Verified on Patient Summary List   Visits from [facility]: 0 Missed Visits: 3       Dear Dr. Basilio Chandler under your direction, we have been providing physical therapy for your patient, for a diagnosis of Left Knee Pain. Alea only attended the initial evaluation and missed 3. She cancelled all of her appointments on 2022 due to \"having too much going on right now\". Due to the inability to further their care from non-attendance, we are discharging the patient from physical therapy at this time. We appreciate the kind referral and would willingly work with this patient again, should they be able to arrange regular attendance. Your patient's health is our primary concern. Should you have any further questions or concerns, please feel free to contact me at your convenience. Sincerely,      Andrea Abdul DPT        Assessments/Recommendations: Discontinue therapy due to lack of attendance or compliance. If you have any questions/comments please contact us directly at [phone]. Thank you for allowing us to assist in the care of your patient. Therapist Signature:  Andrea Abdul DPT Date: 2022   Reporting Period: NA Time: 0:19 PM      Certification Period: NA       NOTE TO PHYSICIAN:  PLEASE COMPLETE THE ORDERS BELOW AND FAX TO   [facility] Physical Therapy: [phone].   If you are unable to process this request in 24 hours please contact our office: [phone].    ___ I have read the above report and request that my patient be discharged from therapy.      Physician Signature:        Date:       Time:    Swathi Daley

## 2022-02-23 ENCOUNTER — APPOINTMENT (OUTPATIENT)
Dept: PHYSICAL THERAPY | Age: 47
End: 2022-02-23

## 2022-02-28 ENCOUNTER — APPOINTMENT (OUTPATIENT)
Dept: PHYSICAL THERAPY | Age: 47
End: 2022-02-28

## 2022-03-08 ENCOUNTER — OFFICE VISIT (OUTPATIENT)
Dept: NEUROLOGY | Age: 47
End: 2022-03-08
Payer: COMMERCIAL

## 2022-03-08 VITALS
HEART RATE: 107 BPM | WEIGHT: 240 LBS | DIASTOLIC BLOOD PRESSURE: 86 MMHG | SYSTOLIC BLOOD PRESSURE: 138 MMHG | RESPIRATION RATE: 16 BRPM | BODY MASS INDEX: 42.51 KG/M2 | OXYGEN SATURATION: 100 %

## 2022-03-08 DIAGNOSIS — R25.1 OCCASIONAL TREMORS: Primary | ICD-10-CM

## 2022-03-08 DIAGNOSIS — G47.9 SLEEP DISORDER: ICD-10-CM

## 2022-03-08 PROCEDURE — 99203 OFFICE O/P NEW LOW 30 MIN: CPT | Performed by: PSYCHIATRY & NEUROLOGY

## 2022-03-08 RX ORDER — TOPIRAMATE 25 MG/1
TABLET ORAL
Qty: 42 TABLET | Refills: 0 | Status: SHIPPED | OUTPATIENT
Start: 2022-03-08

## 2022-03-08 RX ORDER — TOPIRAMATE 25 MG/1
25 TABLET ORAL
Qty: 42 TABLET | Refills: 0 | Status: SHIPPED | OUTPATIENT
Start: 2022-03-08 | End: 2022-03-08 | Stop reason: SDUPTHER

## 2022-03-08 NOTE — PROGRESS NOTES
Ed Lawrence is a 48 y.o. female who is in the office as a follow up. 1. Have you been to the ER, urgent care clinic since your last visit? Hospitalized since your last visit? No    2. Have you seen or consulted any other health care providers outside of the [address] [place] [address] since your last visit? Include any pap smears or colon screening.  No

## 2022-03-08 NOTE — PROGRESS NOTES
48year olds right handed female with history of depression, anxiety, DM, HTN, referred for evaluation of tremors started few years ago getting worse, patient say have episodes of tremors comes and goes 1-2/week, subside itself, patient some times have involuntary hand tremors, patient admit lot of stress and having trouble working, forgetting, no history of seizures, history of ADD and treated when young, patient still seeing psychiatry, patient difficulty focusing, remembering, wake up choking and difficult sleep, getting headaches, sleep during the day, wake up with headaches, tired and fatigue, was tested for sleep apnea but not sure what happened, tachycardiac. Hand paresthesias in hands from CTS. Social History     Socioeconomic History    Marital status:      Spouse name: Not on file    Number of children: Not on file    Years of education: Not on file    Highest education level: Not on file   Occupational History    Not on file   Tobacco Use    Smoking status: Current Every Day Smoker     Packs/day: 0.50     Years: 0.50     Pack years: 0.25    Smokeless tobacco: Never Used   Substance and Sexual Activity    Alcohol use:  Yes     Alcohol/week: 2.0 standard drinks     Types: 2 Cans of beer per week     Comment: monthly 1 or 2    Drug use: Yes     Types: Marijuana, Cocaine     Comment: Cocaine stopped 2008, Marijuana 4/2/16 usually once a month or two    Sexual activity: Not Currently     Partners: Female, Male     Birth control/protection: None   Other Topics Concern     Service Yes     Comment: [id number]    Blood Transfusions Yes     Comment: 2012    Caffeine Concern No    Occupational Exposure No    Hobby Hazards No    Sleep Concern No    Stress Concern Yes    Weight Concern No    Special Diet No    Back Care No    Exercise No    Bike Helmet No    Seat Belt No    Self-Exams No   Social History Narrative    Not on file     Social Determinants of Health Financial Resource Strain:     Difficulty of Paying Living Expenses: Not on file   Food Insecurity:     Worried About Running Out of Food in the Last Year: Not on file    Fina of Food in the Last Year: Not on file   Transportation Needs:     Lack of Transportation (Medical): Not on file    Lack of Transportation (Non-Medical):  Not on file   Physical Activity:     Days of Exercise per Week: Not on file    Minutes of Exercise per Session: Not on file   Stress:     Feeling of Stress : Not on file   Social Connections:     Frequency of Communication with Friends and Family: Not on file    Frequency of Social Gatherings with Friends and Family: Not on file    Attends Sabianist Services: Not on file    Active Member of [address] [place] [address] [employer] or Organizations: Not on file    Attends Club or Organization Meetings: Not on file    Marital Status: Not on file   Intimate Partner Violence:     Fear of Current or Ex-Partner: Not on file    Emotionally Abused: Not on file    Physically Abused: Not on file    Sexually Abused: Not on file   Housing Stability:     Unable to Pay for Housing in the Last Year: Not on file    Number of [place] in the Last Year: Not on file    Unstable Housing in the Last Year: Not on file       Family History   Problem Relation Age of Onset    Hypertension Mother     Diabetes Mother     Depression Mother     Substance Abuse Mother         Tobacco use    Migraines Mother     High Cholesterol Mother     Heart Attack Mother     Heart Failure Mother     Eczema Mother     OSTEOARTHRITIS Mother     Hypertension Father     Diabetes Father     Substance Abuse Father         Tobacco use and drug abuse    Stroke Paternal Aunt     Heart Disease Maternal Grandfather     Hypertension Maternal Grandfather     High Cholesterol Maternal Grandfather     Diabetes Maternal Grandfather     Stroke Maternal Grandfather     Heart Attack Maternal Grandfather     Heart Failure Maternal Grandfather     Alcohol abuse Brother         Drug/Alcohol abuse    Substance Abuse Maternal Grandmother         Tobacco use - MGM, MGF, and brother,    Diabetes Maternal Grandmother     Hypertension Brother     High Cholesterol Brother     Diabetes Sister     Diabetes Paternal Grandmother     Heart Attack Brother     Heart Failure Brother         Current Outpatient Medications   Medication Sig Dispense Refill    meloxicam (MOBIC) 15 mg tablet Take 1 Tablet by mouth daily. 30 Tablet 1    gabapentin (NEURONTIN) 400 mg capsule TAKE ONE CAPSULE BY MOUTH THREE TIMES DAILY 90 Cap 3    oxyCODONE-acetaminophen (PERCOCET) 5-325 mg per tablet Take 1 Tab by mouth every six (6) hours as needed. Max Daily Amount: 4 Tabs. 12 Tab 0    dextroamphetamine-amphetamine (ADDERALL) 20 mg tablet 1 tablet in the morning and afternoon (2 times a day)      butalbital-acetaminophen-caff (FIORICET) -40 mg per capsule Take 1 tablet every 8 hours as needed for headache 12 Cap 0    carvedilol (COREG) 6.25 mg tablet Take 4 Tabs by mouth two (2) times daily (with meals). 180 Tab 3    amLODIPine (NORVASC) 10 mg tablet Take 1 Tab by mouth daily. 90 Tab 3    chlorthalidone (HYGROTEN) 25 mg tablet daily. 3    spironolactone (ALDACTONE) 25 mg tablet   3    raNITIdine (ZANTAC) 150 mg tablet   2    ondansetron (ZOFRAN ODT) 4 mg disintegrating tablet   0    montelukast (SINGULAIR) 10 mg tablet   2    ferrous sulfate 325 mg (65 mg iron) tablet TAKE ONE TABLET BY MOUTH ONCE DAILY BEFORE BREAKFAST 30 Tab 5    diclofenac EC (VOLTAREN) 75 mg EC tablet TAKE ONE TABLET BY MOUTH TWICE DAILY AS NEEDED 60 Tab 5    norethindrone-ethinyl estradiol-iron (MICROGESTIN FE1.5/30) 1.5 mg-30 mcg (21)/75 mg (7) tab Take 1 Tab by mouth daily. Indications: Abnormal Uterine Bleeding 1 Package 12    metFORMIN (GLUCOPHAGE) 1,000 mg tablet Take 1 Tab by mouth two (2) times daily (with meals).  180 Tab 1    atorvastatin (LIPITOR) 80 mg tablet Take 1 Tab by mouth daily. 90 Tab 2    Blood-Glucose Meter (FREESTYLE LITE METER) monitoring kit Check fasting glucose once daily 1 Kit 0    glucose blood VI test strips (FREESTYLE LITE STRIPS) strip Check fasting glucose once daily 100 Strip 2    Lancets misc Check fasting glucose once daily 1 Each 11    cholecalciferol, vitamin D3, (VITAMIN D3) 2,000 unit tab Take  by mouth.  sertraline (ZOLOFT) 50 mg tablet Take 75 mg by mouth nightly.  albuterol (PROVENTIL, VENTOLIN) 90 mcg/actuation inhaler Take 2 Puffs by inhalation every four (4) hours as needed for Wheezing. 17 g 0    methylPREDNISolone (Medrol, [place],) 4 mg tablet Take according to package instructions (Patient not taking: Reported on 1/11/2022) 1 Dose Pack 0       Past Medical History:   Diagnosis Date    Abnormal uterine bleeding (AUB)     Anxiety and depression     Asthma     Cardiac echocardiogram 01/27/2017    EF 55-60%. No WMA. Mod conc LVH. Gr 2 DDfx. Mild AI.  Cardiac nuclear imaging test 01/27/2017    Mod risk. Mod area of anterior apical ischemia likely. No infarction. EF 48%. Mild anterior apical hypk. Equivocal EKG on pharm stress test.   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 repeat in 10 years.    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 pylori test 6/9/14    placed on antibiotics and H2 blocker 6/11/14    Positive PPD, treated 2000    PPD positive, treated     Renal duplex 11/17/2016    No significant RA stenosis.  S/P cardiac catheterization 07/2017    No significant obstructive coronary disease, EF 55%, LVEDP 15 mmHg    Vitamin D deficiency 6/9/14       Past Surgical History:   Procedure Laterality Date    CARDIAC CATHETERIZATION  7/20/2017         CORONARY ARTERY ANGIOGRAM  7/20/2017         HX COLONOSCOPY  04/25/2016    DR. ESTRADA REPEAT IN 2026 (10 YEARS)    HX HERNIA REPAIR  9172    umbilical    LV ANGIOGRAPHY  7/20/2017         MODERATE SEDATION  7/20/2017         IN ENDOMETRIAL CRYOABLATION         Allergies   Allergen Reactions    Ace Inhibitors Swelling       Patient Active Problem List   Diagnosis Code    HTN (hypertension) I10    Gastroesophageal reflux disease K21.9    Anxiety and depression F41.9, F32. A    Positive H. pylori test A04.8    Iron deficiency anemia D50.9    Vitamin D deficiency E55.9    Tobacco use Z72.0    Bilateral carpal tunnel syndrome G56.03    Pure hypercholesterolemia E78.00    Episodic tension-type headache, not intractable G44.219    BMI 40.0-44.9, adult ([facility]) Z68.41    Controlled type 2 diabetes mellitus with diabetic nephropathy, without long-term current use of insulin ([facility]) E11.21    Microalbuminuria- h/o angioedema on ace R80.9    History of positive PPD Z92.89    Dyslipidemia E78.5    Hypertensive heart disease without heart failure I11.9    Morbid obesity ([facility]) E66.01    Controlled type 2 diabetes mellitus with microalbuminuria, without long-term current use of insulin ([facility]) E11.29, R80.9    Fibroids D21.9         Review of Systems:   As above otherwise 11 point review of systems negative including;   Constitutional no fever or chills  Skin denies rash or itching  HENT  Denies tinnitus, hearing lose  Eyes denies diplopia vision lose  Respiratory denies shortness of breath  Cardiovascular denies chest pain, dyspnea on exertion  Gastrointestinal denies nausea, vomiting, diarrhea, constipation  Genitourinary denies incontinence  Musculoskeletal denies joint pain or swelling  Endocrine denies weight change  Hematology denies easy bruising or bleeding   Neurological as above in HPI      PHYSICAL EXAMINATION:      VITAL SIGNS:    Visit Vitals  /86   Pulse (!) 107   Resp 16   Wt 108.9 kg (240 lb)   SpO2 100%   BMI 42.51 kg/m²       GENERAL: The patient is well developed, well nourished, and in no apparent distress. EXTREMITIES: No clubbing, cyanosis, or edema is identified. Pulses 2+ and symmetrical.  Muscle tone is normal.  HEAD:   Ear, nose, and throat appear to be without trauma. The patient is normocephalic. NEUROLOGIC EXAMINATION  Mental status: Awake, alert, oriented x3, follows simple commands. Speech and languge: fluent, coherent, naming and repitition intact, reading and comprehension intact  CN: VFF, EOMI, PERRLA, face sensation intact , no facial asymmetry noted, palate elevation symmetric bilat, SS+SCM 5/5 bilat, tongue midline  Motor: no pronator drift, tone normal throughout, strength 5/5 throughout  Sensory: intact to light touch throughout  Coordination: FNF, HS accurate w/o dysmetria  DTR: 1+ throughout, toes downgoing BL  Gait: normal.            Impression:  48year olds right handed female with history of depression, anxiety, DM, HTN, referred for evaluation of tremors started few years ago getting worse, patient say have episodes of tremors comes and goes 1-2/week, subside itself, patient some times have involuntary hand tremors, patient admit lot of stress and having trouble working, forgetting, no history of seizures, history of ADD and treated when young, patient still seeing psychiatry, patient difficulty focusing, remembering, wake up choking and difficult sleep, getting headaches, sleep during the day, wake up with headaches, tired and fatigue, was tested for sleep apnea but not sure what happened, tachycardiac.   Hand paresthesias in hands from CTS.    TREMORS-Most likely stress related from anxiety. SLEEP DISORDER--Sleep apnea contributing to her depression anxiety and tremors. DEPRESSION AND ANXIETY. Headaches most likely related to above. Plan:  SLEEP STUDY, Sleep hygiene, loosing weight  Check TSH and B12. Tot[employer]mate Contraindications discussed with patient to start help weight loss and headaches as well. Follow up with psychiatry    Will follow up in a month after the study     I spent 35 minutes with the patient in face-to-face consultation, of which greater than 50% was spent in counseling and coordination of care as described above. PLEASE NOTE:   This document has been produced using voice recognition software. Unrecognized errors in transcription may be present.

## 2022-03-18 PROBLEM — Z92.89 HISTORY OF POSITIVE PPD: Status: ACTIVE | Noted: 2017-11-21

## 2022-03-18 PROBLEM — G44.219 EPISODIC TENSION-TYPE HEADACHE, NOT INTRACTABLE: Status: ACTIVE | Noted: 2017-08-03

## 2022-03-18 PROBLEM — R80.9 MICROALBUMINURIA: Status: ACTIVE | Noted: 2017-11-09

## 2022-03-18 PROBLEM — E11.21 CONTROLLED TYPE 2 DIABETES MELLITUS WITH DIABETIC NEPHROPATHY, WITHOUT LONG-TERM CURRENT USE OF INSULIN (HCC): Status: ACTIVE | Noted: 2017-11-09

## 2022-03-19 PROBLEM — E66.01 MORBID OBESITY (HCC): Status: ACTIVE | Noted: 2018-06-01

## 2022-03-19 PROBLEM — R80.9 CONTROLLED TYPE 2 DIABETES MELLITUS WITH MICROALBUMINURIA, WITHOUT LONG-TERM CURRENT USE OF INSULIN (HCC): Status: ACTIVE | Noted: 2018-08-28

## 2022-03-19 PROBLEM — E11.29 CONTROLLED TYPE 2 DIABETES MELLITUS WITH MICROALBUMINURIA, WITHOUT LONG-TERM CURRENT USE OF INSULIN (HCC): Status: ACTIVE | Noted: 2018-08-28

## 2022-03-19 PROBLEM — E78.5 DYSLIPIDEMIA: Status: ACTIVE | Noted: 2018-06-01

## 2022-03-19 PROBLEM — I11.9 HYPERTENSIVE HEART DISEASE WITHOUT HEART FAILURE: Status: ACTIVE | Noted: 2018-06-01

## 2022-03-19 PROBLEM — D21.9 FIBROIDS: Status: ACTIVE | Noted: 2018-09-10

## 2022-04-20 ENCOUNTER — DOCUMENTATION ONLY (OUTPATIENT)
Dept: ORTHOPEDIC SURGERY | Age: 47
End: 2022-04-20

## 2024-03-21 NOTE — TELEPHONE ENCOUNTER
----- Message from Jerri Perea MD sent at 2/11/2017 12:39 PM EST -----  Does she have a follow-up scheduled with me? Did her blood pressure ever get any better?
Patient states she's doing well, last visit to PCP blood pressure 110/80, states she hasn't see a reading that good in a while. Scheduled for follow up visit on March 8th.
never true

## 2024-10-29 ENCOUNTER — OFFICE VISIT (OUTPATIENT)
Age: 49
End: 2024-10-29
Payer: COMMERCIAL

## 2024-10-29 VITALS
SYSTOLIC BLOOD PRESSURE: 154 MMHG | HEART RATE: 92 BPM | HEIGHT: 63 IN | BODY MASS INDEX: 38.34 KG/M2 | OXYGEN SATURATION: 97 % | DIASTOLIC BLOOD PRESSURE: 98 MMHG | TEMPERATURE: 96.8 F | WEIGHT: 216.4 LBS

## 2024-10-29 DIAGNOSIS — R53.83 MALAISE AND FATIGUE: ICD-10-CM

## 2024-10-29 DIAGNOSIS — I1A.0 RESISTANT HYPERTENSION: ICD-10-CM

## 2024-10-29 DIAGNOSIS — E78.00 HYPERCHOLESTEREMIA: ICD-10-CM

## 2024-10-29 DIAGNOSIS — R06.02 EXERTIONAL SHORTNESS OF BREATH: Primary | ICD-10-CM

## 2024-10-29 DIAGNOSIS — R53.81 MALAISE AND FATIGUE: ICD-10-CM

## 2024-10-29 DIAGNOSIS — R01.1 SYSTOLIC MURMUR: ICD-10-CM

## 2024-10-29 DIAGNOSIS — F90.9 ATTENTION DEFICIT HYPERACTIVITY DISORDER (ADHD), UNSPECIFIED ADHD TYPE: ICD-10-CM

## 2024-10-29 DIAGNOSIS — Z72.0 TOBACCO ABUSE: ICD-10-CM

## 2024-10-29 DIAGNOSIS — J45.909 MODERATE ASTHMA WITHOUT COMPLICATION, UNSPECIFIED WHETHER PERSISTENT: ICD-10-CM

## 2024-10-29 DIAGNOSIS — I42.0 CONGESTIVE CARDIOMYOPATHY (HCC): ICD-10-CM

## 2024-10-29 DIAGNOSIS — I50.42 CHRONIC COMBINED SYSTOLIC AND DIASTOLIC CHF (CONGESTIVE HEART FAILURE) (HCC): ICD-10-CM

## 2024-10-29 PROCEDURE — 99205 OFFICE O/P NEW HI 60 MIN: CPT | Performed by: INTERNAL MEDICINE

## 2024-10-29 PROCEDURE — 3080F DIAST BP >= 90 MM HG: CPT | Performed by: INTERNAL MEDICINE

## 2024-10-29 PROCEDURE — 3077F SYST BP >= 140 MM HG: CPT | Performed by: INTERNAL MEDICINE

## 2024-10-29 RX ORDER — FUROSEMIDE 40 MG/1
40 TABLET ORAL DAILY
COMMUNITY
Start: 2024-03-17

## 2024-10-29 RX ORDER — PREDNISONE 20 MG/1
TABLET ORAL
COMMUNITY
Start: 2024-08-20

## 2024-10-29 RX ORDER — LIDOCAINE 50 MG/G
1 PATCH TOPICAL
COMMUNITY

## 2024-10-29 RX ORDER — HYDROXYZINE HYDROCHLORIDE 25 MG/1
25 TABLET, FILM COATED ORAL SEE ADMIN INSTRUCTIONS
COMMUNITY
Start: 2024-01-27

## 2024-10-29 RX ORDER — ZOLPIDEM TARTRATE 10 MG/1
10 TABLET ORAL NIGHTLY PRN
COMMUNITY

## 2024-10-29 RX ORDER — GABAPENTIN 600 MG/1
600 TABLET ORAL 3 TIMES DAILY
COMMUNITY
Start: 2024-08-20

## 2024-10-29 RX ORDER — CHLORHEXIDINE GLUCONATE ORAL RINSE 1.2 MG/ML
SOLUTION DENTAL
COMMUNITY
Start: 2024-10-09

## 2024-10-29 RX ORDER — FLUTICASONE FUROATE AND VILANTEROL 200; 25 UG/1; UG/1
1 POWDER RESPIRATORY (INHALATION) DAILY
COMMUNITY
Start: 2024-03-18

## 2024-10-29 RX ORDER — FLUTICASONE PROPIONATE 50 MCG
1 SPRAY, SUSPENSION (ML) NASAL DAILY
COMMUNITY
Start: 2024-03-18

## 2024-10-29 RX ORDER — POTASSIUM CHLORIDE 1500 MG/1
20 TABLET, EXTENDED RELEASE ORAL
COMMUNITY

## 2024-10-29 RX ORDER — DAPAGLIFLOZIN 10 MG/1
10 TABLET, FILM COATED ORAL DAILY
COMMUNITY
Start: 2024-03-19

## 2024-10-29 ASSESSMENT — PATIENT HEALTH QUESTIONNAIRE - PHQ9
SUM OF ALL RESPONSES TO PHQ QUESTIONS 1-9: 0
SUM OF ALL RESPONSES TO PHQ QUESTIONS 1-9: 0
SUM OF ALL RESPONSES TO PHQ9 QUESTIONS 1 & 2: 0
1. LITTLE INTEREST OR PLEASURE IN DOING THINGS: NOT AT ALL
SUM OF ALL RESPONSES TO PHQ QUESTIONS 1-9: 0
2. FEELING DOWN, DEPRESSED OR HOPELESS: NOT AT ALL
SUM OF ALL RESPONSES TO PHQ QUESTIONS 1-9: 0

## 2024-10-29 NOTE — PROGRESS NOTES
1. \"Have you been to the ER, urgent care clinic since your last visit?  Hospitalized since your last visit?\" Reviewed by Dr. Zay Palma    2. \"Have you seen or consulted any other health care providers outside of the [facility] since your last visit?\" Reviewed by Dr. Zay Palma  
Follow up with echo.    On this date 10/29/2024 I have spent 63 minutes reviewing previous notes, test results and face to face with the patient discussing the diagnosis and importance of compliance with the treatment plan as well as documenting on the day of the visit.    The patient (or guardian, if applicable) and other individuals in attendance with the patient were advised that Artificial Intelligence will be utilized during this visit to record, process the conversation to generate a clinical note and to support improvement of the AI technology. The patient (or guardian, if applicable) and other individuals in attendance at the appointment consented to the use of AI, including the recording.       An electronic signature was used to authenticate this note.    --Zay Palma MD

## 2024-11-15 ENCOUNTER — OFFICE VISIT (OUTPATIENT)
Age: 49
End: 2024-11-15
Payer: COMMERCIAL

## 2024-11-15 VITALS — HEIGHT: 63 IN | BODY MASS INDEX: 39.58 KG/M2 | WEIGHT: 223.4 LBS

## 2024-11-15 DIAGNOSIS — G56.23 CUBITAL TUNNEL SYNDROME, BILATERAL: ICD-10-CM

## 2024-11-15 DIAGNOSIS — G56.03 BILATERAL CARPAL TUNNEL SYNDROME: Primary | ICD-10-CM

## 2024-11-15 PROCEDURE — 99214 OFFICE O/P EST MOD 30 MIN: CPT | Performed by: ORTHOPAEDIC SURGERY

## 2024-11-15 RX ORDER — METHYLPREDNISOLONE 4 MG/1
TABLET ORAL
Qty: 1 KIT | Refills: 0 | Status: SHIPPED | OUTPATIENT
Start: 2024-11-15 | End: 2024-11-21

## 2024-11-15 NOTE — PROGRESS NOTES
Kira Mattson is a 49 y.o. female right handed.  Worker's Compensation and legal considerations: none    Chief Complaint   Patient presents with    Hand Pain     Bilateral     Pain Score:   3    Subjective:     Initial HPI: Patient presents today for follow-up due to worsening bilateral hand numbness and tingling.  She reports to been doing well until recently when she saw chiropractor who did a manipulation of both of her hands and wrist and now she is having the numbness and tingling that she previously had.  Previously it was only the left side but now it is both sides.  She localizes the numbness and tingling to be worse in the ring and little fingers.    Date of onset: Chronic  Injury: No  Prior Treatment:  No  Contributory history: History of left carpal tunnel injection, left carpal tunnel brace, and left tennis elbow band.    ROS: Review of Systems - General ROS: negative except HPI    Past Medical History:   Diagnosis Date    Abnormal nuclear cardiac imaging test 01/27/2017    Mod risk.  Mod area of anterior apical ischemia likely.  No infarction.  EF 48%.  Mild anterior apical hypk.  Equivocal EKG on pharm stress test.    Abnormal uterine bleeding (AUB)     Anxiety and depression     Asthma     Diabetes (HCC)     GERD (gastroesophageal reflux disease)     History of blood transfusion     History of echocardiogram 01/27/2017    EF 55-60%.  No WMA.  Mod conc LVH.  Gr 2 DDfx.  Mild AI.      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 repeat in 10 years.    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

## 2024-12-11 DIAGNOSIS — G56.23 CUBITAL TUNNEL SYNDROME, BILATERAL: ICD-10-CM

## 2024-12-11 DIAGNOSIS — G56.03 BILATERAL CARPAL TUNNEL SYNDROME: ICD-10-CM

## 2025-01-20 DIAGNOSIS — R06.02 EXERTIONAL SHORTNESS OF BREATH: Primary | ICD-10-CM

## 2025-01-20 DIAGNOSIS — I42.0 CONGESTIVE CARDIOMYOPATHY (HCC): ICD-10-CM

## 2025-01-20 DIAGNOSIS — I50.42 CHRONIC COMBINED SYSTOLIC AND DIASTOLIC CHF (CONGESTIVE HEART FAILURE) (HCC): ICD-10-CM

## 2025-04-21 ENCOUNTER — OFFICE VISIT (OUTPATIENT)
Age: 50
End: 2025-04-21
Payer: COMMERCIAL

## 2025-04-21 VITALS
BODY MASS INDEX: 39.73 KG/M2 | SYSTOLIC BLOOD PRESSURE: 140 MMHG | OXYGEN SATURATION: 100 % | HEIGHT: 63 IN | DIASTOLIC BLOOD PRESSURE: 82 MMHG | HEART RATE: 87 BPM | TEMPERATURE: 97 F | WEIGHT: 224.2 LBS

## 2025-04-21 DIAGNOSIS — Z72.0 TOBACCO USE: ICD-10-CM

## 2025-04-21 DIAGNOSIS — R00.0 TACHYCARDIA: ICD-10-CM

## 2025-04-21 DIAGNOSIS — I1A.0 RESISTANT HYPERTENSION: Primary | ICD-10-CM

## 2025-04-21 DIAGNOSIS — I27.20 PULMONARY HYPERTENSION (HCC): ICD-10-CM

## 2025-04-21 DIAGNOSIS — G47.00 FREQUENT NOCTURNAL AWAKENING: ICD-10-CM

## 2025-04-21 DIAGNOSIS — E78.00 HYPERCHOLESTEROLEMIA: ICD-10-CM

## 2025-04-21 DIAGNOSIS — I50.42 CHRONIC COMBINED SYSTOLIC AND DIASTOLIC CHF (CONGESTIVE HEART FAILURE) (HCC): ICD-10-CM

## 2025-04-21 DIAGNOSIS — F17.290 VAPING NICOTINE DEPENDENCE, TOBACCO PRODUCT: ICD-10-CM

## 2025-04-21 PROCEDURE — 3079F DIAST BP 80-89 MM HG: CPT

## 2025-04-21 PROCEDURE — 99215 OFFICE O/P EST HI 40 MIN: CPT

## 2025-04-21 PROCEDURE — 3077F SYST BP >= 140 MM HG: CPT

## 2025-04-21 PROCEDURE — 93000 ELECTROCARDIOGRAM COMPLETE: CPT

## 2025-04-21 RX ORDER — DULAGLUTIDE 0.75 MG/.5ML
INJECTION, SOLUTION SUBCUTANEOUS
COMMUNITY
Start: 2025-02-03

## 2025-04-21 ASSESSMENT — ENCOUNTER SYMPTOMS
DIARRHEA: 0
NAUSEA: 0
WHEEZING: 0
VOMITING: 0
COUGH: 0
RHINORRHEA: 0
SHORTNESS OF BREATH: 1
SORE THROAT: 0
ABDOMINAL PAIN: 0

## 2025-04-21 ASSESSMENT — PATIENT HEALTH QUESTIONNAIRE - PHQ9
SUM OF ALL RESPONSES TO PHQ QUESTIONS 1-9: 0
1. LITTLE INTEREST OR PLEASURE IN DOING THINGS: NOT AT ALL
2. FEELING DOWN, DEPRESSED OR HOPELESS: NOT AT ALL
SUM OF ALL RESPONSES TO PHQ QUESTIONS 1-9: 0

## 2025-04-21 NOTE — PROGRESS NOTES
1. \"Have you been to the ER, urgent care clinic since your last visit?  Hospitalized since your last visit?\" Reviewed by KATIUSKA Kuhn    2. \"Have you seen or consulted any other health care providers outside of the [facility] since your last visit?\" Reviewed by  KATIUSKA Kuhn   
chamber sizes.  No segmental wall motion abnormalities.  Normal IVC size.    Subjective   SUBJECTIVE/OBJECTIVE:  HPI  See above  Past Medical History:   Diagnosis Date    Abnormal nuclear cardiac imaging test 01/27/2017    Mod risk.  Mod area of anterior apical ischemia likely.  No infarction.  EF 48%.  Mild anterior apical hypk.  Equivocal EKG on pharm stress test.    Abnormal uterine bleeding (AUB)     Anxiety and depression     Asthma     Diabetes (HCC)     GERD (gastroesophageal reflux disease)     History of blood transfusion     History of echocardiogram 01/27/2017    EF 55-60%.  No WMA.  Mod conc LVH.  Gr 2 DDfx.  Mild AI.      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 repeat in 10 years.    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 pylori test 6/9/14    placed on antibiotics and H2 blocker 6/11/14    Positive PPD, treated 2000    PPD positive, treated     Renal hypertension 11/17/2016    No significant RA stenosis.      S/P cardiac catheterization 07/2017    No significant obstructive coronary disease, EF 55%, LVEDP 15 mmHg    Vitamin D deficiency 6/9/14        Past Surgical History:   Procedure Laterality Date    CARDIAC CATH PROCEDURE  7/20/2017         COLONOSCOPY  04/25/2016    DR. BHATT REPEAT IN 2026 (10 YEARS)    CORONARY ARTERY ANGIOGRAM  7/20/2017         ENDOMETRIAL CRYOABLATION      HERNIA REPAIR  1981    umbilical    IR EMBOLIZATION TUMOR/ORGAN  9/10/2018    IR EMBOLIZATION TUMOR / ORGAN 9/10/2018 [facility] RAD ANGIO IR    LV ANGIOGRAPHY  7/20/2017         MODERATE

## 2025-04-21 NOTE — PATIENT INSTRUCTIONS
Recommendations:  -Make sure you are taking your medications consistently, it is important to take your medications as prescribed  -Low salt diet - less than 2g daily   -Fluid restriction - 1.5L to 2L daily   -Exercise, weight loss  -See your sleep provider  -Once you are being consistent with your medications, send me BP readings so I can decide if we can increase your entresto    Patient Education        DASH Diet: Care Instructions  Your Care Instructions     The DASH diet is an eating plan that can help lower your blood pressure. DASH stands for Dietary Approaches to Stop Hypertension. Hypertension is high blood pressure.  The DASH diet focuses on eating foods that are high in calcium, potassium, and magnesium. These nutrients can lower blood pressure. The foods that are highest in these nutrients are fruits, vegetables, low-fat dairy products, nuts, seeds, and legumes. But taking calcium, potassium, and magnesium supplements instead of eating foods that are high in those nutrients does not have the same effect. The DASH diet also includes whole grains, fish, and poultry.  The DASH diet is one of several lifestyle changes your doctor may recommend to lower your high blood pressure. Your doctor may also want you to decrease the amount of sodium in your diet. Lowering sodium while following the DASH diet can lower blood pressure even further than just the DASH diet alone.  Follow-up care is a key part of your treatment and safety. Be sure to make and go to all appointments, and call your doctor if you are having problems. It's also a good idea to know your test results and keep a list of the medicines you take.  How can you care for yourself at home?  Following the DASH diet  Eat 4 to 5 servings of fruit each day. A serving is 1 medium-sized piece of fruit, 1/2 cup raw or canned fruit, 1/4 cup dried fruit, or 4 ounces (1/2 cup) of fruit juice. Choose fruit more often than fruit juice.  Eat 4 to 5 servings of

## 2025-08-05 ENCOUNTER — OFFICE VISIT (OUTPATIENT)
Age: 50
End: 2025-08-05
Payer: COMMERCIAL

## 2025-08-05 VITALS
OXYGEN SATURATION: 99 % | BODY MASS INDEX: 39.16 KG/M2 | HEIGHT: 63 IN | DIASTOLIC BLOOD PRESSURE: 102 MMHG | HEART RATE: 100 BPM | SYSTOLIC BLOOD PRESSURE: 158 MMHG | WEIGHT: 221 LBS

## 2025-08-05 DIAGNOSIS — R53.83 MALAISE AND FATIGUE: ICD-10-CM

## 2025-08-05 DIAGNOSIS — F90.9 ATTENTION DEFICIT HYPERACTIVITY DISORDER (ADHD), UNSPECIFIED ADHD TYPE: ICD-10-CM

## 2025-08-05 DIAGNOSIS — Z72.0 TOBACCO ABUSE: ICD-10-CM

## 2025-08-05 DIAGNOSIS — I50.42 CHRONIC COMBINED SYSTOLIC AND DIASTOLIC CHF (CONGESTIVE HEART FAILURE) (HCC): ICD-10-CM

## 2025-08-05 DIAGNOSIS — I27.20 PULMONARY HYPERTENSION (HCC): ICD-10-CM

## 2025-08-05 DIAGNOSIS — E78.00 HYPERCHOLESTEREMIA: ICD-10-CM

## 2025-08-05 DIAGNOSIS — R01.1 SYSTOLIC MURMUR: ICD-10-CM

## 2025-08-05 DIAGNOSIS — G47.33 OBSTRUCTIVE SLEEP APNEA: ICD-10-CM

## 2025-08-05 DIAGNOSIS — J45.909 MODERATE ASTHMA WITHOUT COMPLICATION, UNSPECIFIED WHETHER PERSISTENT: ICD-10-CM

## 2025-08-05 DIAGNOSIS — I42.0 CONGESTIVE CARDIOMYOPATHY (HCC): ICD-10-CM

## 2025-08-05 DIAGNOSIS — R53.81 MALAISE AND FATIGUE: ICD-10-CM

## 2025-08-05 DIAGNOSIS — R06.02 EXERTIONAL SHORTNESS OF BREATH: Primary | ICD-10-CM

## 2025-08-05 PROCEDURE — 3079F DIAST BP 80-89 MM HG: CPT | Performed by: INTERNAL MEDICINE

## 2025-08-05 PROCEDURE — 3077F SYST BP >= 140 MM HG: CPT | Performed by: INTERNAL MEDICINE

## 2025-08-05 PROCEDURE — 99215 OFFICE O/P EST HI 40 MIN: CPT | Performed by: INTERNAL MEDICINE

## 2025-08-05 RX ORDER — FAMOTIDINE 40 MG/1
TABLET, FILM COATED ORAL
COMMUNITY
Start: 2025-05-10

## 2025-08-05 RX ORDER — SACUBITRIL AND VALSARTAN 97; 103 MG/1; MG/1
1 TABLET, FILM COATED ORAL 2 TIMES DAILY
Qty: 60 TABLET | Refills: 11 | Status: SHIPPED | OUTPATIENT
Start: 2025-08-05

## 2025-08-05 RX ORDER — NIFEDIPINE 30 MG/1
30 TABLET, EXTENDED RELEASE ORAL DAILY
Qty: 30 TABLET | Refills: 11 | Status: SHIPPED | OUTPATIENT
Start: 2025-08-05

## 2025-08-05 ASSESSMENT — PATIENT HEALTH QUESTIONNAIRE - PHQ9
SUM OF ALL RESPONSES TO PHQ QUESTIONS 1-9: 0
8. MOVING OR SPEAKING SO SLOWLY THAT OTHER PEOPLE COULD HAVE NOTICED. OR THE OPPOSITE, BEING SO FIGETY OR RESTLESS THAT YOU HAVE BEEN MOVING AROUND A LOT MORE THAN USUAL: NOT AT ALL
6. FEELING BAD ABOUT YOURSELF - OR THAT YOU ARE A FAILURE OR HAVE LET YOURSELF OR YOUR FAMILY DOWN: NOT AT ALL
5. POOR APPETITE OR OVEREATING: NOT AT ALL
9. THOUGHTS THAT YOU WOULD BE BETTER OFF DEAD, OR OF HURTING YOURSELF: NOT AT ALL
3. TROUBLE FALLING OR STAYING ASLEEP: NOT AT ALL
1. LITTLE INTEREST OR PLEASURE IN DOING THINGS: NOT AT ALL
SUM OF ALL RESPONSES TO PHQ QUESTIONS 1-9: 0
SUM OF ALL RESPONSES TO PHQ QUESTIONS 1-9: 0
7. TROUBLE CONCENTRATING ON THINGS, SUCH AS READING THE NEWSPAPER OR WATCHING TELEVISION: NOT AT ALL
10. IF YOU CHECKED OFF ANY PROBLEMS, HOW DIFFICULT HAVE THESE PROBLEMS MADE IT FOR YOU TO DO YOUR WORK, TAKE CARE OF THINGS AT HOME, OR GET ALONG WITH OTHER PEOPLE: NOT DIFFICULT AT ALL
4. FEELING TIRED OR HAVING LITTLE ENERGY: NOT AT ALL
SUM OF ALL RESPONSES TO PHQ QUESTIONS 1-9: 0
2. FEELING DOWN, DEPRESSED OR HOPELESS: NOT AT ALL

## 2025-08-05 ASSESSMENT — ENCOUNTER SYMPTOMS
EYES NEGATIVE: 1
ALLERGIC/IMMUNOLOGIC NEGATIVE: 1
GASTROINTESTINAL NEGATIVE: 1
SHORTNESS OF BREATH: 1

## 2025-08-27 ENCOUNTER — HOSPITAL ENCOUNTER (OUTPATIENT)
Facility: HOSPITAL | Age: 50
Setting detail: SPECIMEN
Discharge: HOME | End: 2025-08-30
Payer: COMMERCIAL

## 2025-08-27 ENCOUNTER — OFFICE VISIT (OUTPATIENT)
Age: 50
End: 2025-08-27
Payer: COMMERCIAL

## 2025-08-27 VITALS
WEIGHT: 217 LBS | DIASTOLIC BLOOD PRESSURE: 80 MMHG | SYSTOLIC BLOOD PRESSURE: 130 MMHG | HEART RATE: 89 BPM | BODY MASS INDEX: 38.45 KG/M2 | HEIGHT: 63 IN | OXYGEN SATURATION: 99 %

## 2025-08-27 DIAGNOSIS — G47.33 OSA (OBSTRUCTIVE SLEEP APNEA): ICD-10-CM

## 2025-08-27 DIAGNOSIS — I1A.0 RESISTANT HYPERTENSION: Primary | ICD-10-CM

## 2025-08-27 DIAGNOSIS — I50.42 CHRONIC COMBINED SYSTOLIC AND DIASTOLIC CHF (CONGESTIVE HEART FAILURE) (HCC): ICD-10-CM

## 2025-08-27 DIAGNOSIS — Z72.0 TOBACCO USE: ICD-10-CM

## 2025-08-27 LAB
ANION GAP SERPL CALC-SCNC: 14 MMOL/L (ref 3–18)
BUN SERPL-MCNC: 14 MG/DL (ref 6–23)
BUN/CREAT SERPL: 20 (ref 12–20)
CALCIUM SERPL-MCNC: 9.9 MG/DL (ref 8.5–10.1)
CHLORIDE SERPL-SCNC: 101 MMOL/L (ref 98–107)
CO2 SERPL-SCNC: 24 MMOL/L (ref 21–32)
CREAT SERPL-MCNC: 0.7 MG/DL (ref 0.6–1.3)
GLUCOSE SERPL-MCNC: 127 MG/DL (ref 74–108)
POTASSIUM SERPL-SCNC: 3.8 MMOL/L (ref 3.5–5.5)
SODIUM SERPL-SCNC: 138 MMOL/L (ref 136–145)

## 2025-08-27 PROCEDURE — 99214 OFFICE O/P EST MOD 30 MIN: CPT

## 2025-08-27 PROCEDURE — 3079F DIAST BP 80-89 MM HG: CPT

## 2025-08-27 PROCEDURE — 36415 COLL VENOUS BLD VENIPUNCTURE: CPT

## 2025-08-27 PROCEDURE — 3075F SYST BP GE 130 - 139MM HG: CPT

## 2025-08-27 PROCEDURE — 80048 BASIC METABOLIC PNL TOTAL CA: CPT

## 2025-08-27 RX ORDER — LORATADINE 10 MG/1
10 TABLET ORAL DAILY
COMMUNITY
Start: 2025-08-05

## 2025-08-27 RX ORDER — METFORMIN HYDROCHLORIDE 500 MG/1
TABLET, EXTENDED RELEASE ORAL
COMMUNITY
Start: 2025-08-05

## 2025-08-27 RX ORDER — SITAGLIPTIN 25 MG/1
25 TABLET, FILM COATED ORAL DAILY
COMMUNITY
Start: 2025-08-10

## 2025-08-27 RX ORDER — GALCANEZUMAB 120 MG/ML
INJECTION, SOLUTION SUBCUTANEOUS
COMMUNITY
Start: 2025-08-04

## 2025-08-27 RX ORDER — ATORVASTATIN CALCIUM 20 MG/1
TABLET, FILM COATED ORAL
COMMUNITY
Start: 2025-08-05

## 2025-08-27 RX ORDER — RIMEGEPANT SULFATE 75 MG/75MG
TABLET, ORALLY DISINTEGRATING ORAL
COMMUNITY
Start: 2025-08-13

## 2025-08-27 RX ORDER — CARVEDILOL 25 MG/1
25 TABLET ORAL 2 TIMES DAILY
COMMUNITY
Start: 2025-08-05

## 2025-08-27 ASSESSMENT — ENCOUNTER SYMPTOMS
NAUSEA: 0
RHINORRHEA: 0
ABDOMINAL PAIN: 0
DIARRHEA: 0
VOMITING: 0
WHEEZING: 0
SHORTNESS OF BREATH: 1
COUGH: 0
SORE THROAT: 0